# Patient Record
Sex: MALE | Race: WHITE | NOT HISPANIC OR LATINO | Employment: OTHER | URBAN - METROPOLITAN AREA
[De-identification: names, ages, dates, MRNs, and addresses within clinical notes are randomized per-mention and may not be internally consistent; named-entity substitution may affect disease eponyms.]

---

## 2017-01-16 ENCOUNTER — COMMUNICATION - HEALTHEAST (OUTPATIENT)
Dept: FAMILY MEDICINE | Facility: CLINIC | Age: 62
End: 2017-01-16

## 2017-02-23 ENCOUNTER — OFFICE VISIT - HEALTHEAST (OUTPATIENT)
Dept: FAMILY MEDICINE | Facility: CLINIC | Age: 62
End: 2017-02-23

## 2017-02-23 DIAGNOSIS — Z20.828 EXPOSURE TO INFLUENZA: ICD-10-CM

## 2017-02-23 DIAGNOSIS — R05.9 COUGH: ICD-10-CM

## 2017-02-23 ASSESSMENT — MIFFLIN-ST. JEOR: SCORE: 1908.03

## 2017-05-06 ENCOUNTER — COMMUNICATION - HEALTHEAST (OUTPATIENT)
Dept: FAMILY MEDICINE | Facility: CLINIC | Age: 62
End: 2017-05-06

## 2017-05-06 DIAGNOSIS — E78.5 HYPERLIPIDEMIA: ICD-10-CM

## 2017-05-06 DIAGNOSIS — I10 ESSENTIAL HYPERTENSION, BENIGN: ICD-10-CM

## 2017-05-31 ENCOUNTER — OFFICE VISIT - HEALTHEAST (OUTPATIENT)
Dept: FAMILY MEDICINE | Facility: CLINIC | Age: 62
End: 2017-05-31

## 2017-05-31 DIAGNOSIS — I10 HYPERTENSION: ICD-10-CM

## 2017-05-31 DIAGNOSIS — N40.0 BPH (BENIGN PROSTATIC HYPERPLASIA): ICD-10-CM

## 2017-05-31 DIAGNOSIS — E78.5 HYPERLIPIDEMIA: ICD-10-CM

## 2017-05-31 DIAGNOSIS — Z00.00 PHYSICAL EXAM: ICD-10-CM

## 2017-05-31 DIAGNOSIS — R60.0 PEDAL EDEMA: ICD-10-CM

## 2017-05-31 LAB
CHOLEST SERPL-MCNC: 181 MG/DL
FASTING STATUS PATIENT QL REPORTED: YES
HDLC SERPL-MCNC: 57 MG/DL
LDLC SERPL CALC-MCNC: 92 MG/DL
TRIGL SERPL-MCNC: 158 MG/DL

## 2017-05-31 ASSESSMENT — MIFFLIN-ST. JEOR: SCORE: 1837.15

## 2017-06-05 ENCOUNTER — COMMUNICATION - HEALTHEAST (OUTPATIENT)
Dept: FAMILY MEDICINE | Facility: CLINIC | Age: 62
End: 2017-06-05

## 2017-07-25 ENCOUNTER — COMMUNICATION - HEALTHEAST (OUTPATIENT)
Dept: FAMILY MEDICINE | Facility: CLINIC | Age: 62
End: 2017-07-25

## 2017-08-22 ENCOUNTER — COMMUNICATION - HEALTHEAST (OUTPATIENT)
Dept: FAMILY MEDICINE | Facility: CLINIC | Age: 62
End: 2017-08-22

## 2017-08-22 DIAGNOSIS — E78.5 HYPERLIPIDEMIA: ICD-10-CM

## 2017-08-22 DIAGNOSIS — I10 ESSENTIAL HYPERTENSION, BENIGN: ICD-10-CM

## 2018-02-08 ENCOUNTER — OFFICE VISIT - HEALTHEAST (OUTPATIENT)
Dept: FAMILY MEDICINE | Facility: CLINIC | Age: 63
End: 2018-02-08

## 2018-02-08 DIAGNOSIS — K57.92 DIVERTICULITIS: ICD-10-CM

## 2018-02-08 ASSESSMENT — MIFFLIN-ST. JEOR: SCORE: 1913.92

## 2018-02-12 ENCOUNTER — OFFICE VISIT - HEALTHEAST (OUTPATIENT)
Dept: FAMILY MEDICINE | Facility: CLINIC | Age: 63
End: 2018-02-12

## 2018-02-12 DIAGNOSIS — K92.1 BLOODY STOOLS: ICD-10-CM

## 2018-02-12 ASSESSMENT — MIFFLIN-ST. JEOR: SCORE: 1908.03

## 2018-02-13 ENCOUNTER — COMMUNICATION - HEALTHEAST (OUTPATIENT)
Dept: FAMILY MEDICINE | Facility: CLINIC | Age: 63
End: 2018-02-13

## 2018-04-02 ENCOUNTER — OFFICE VISIT - HEALTHEAST (OUTPATIENT)
Dept: FAMILY MEDICINE | Facility: CLINIC | Age: 63
End: 2018-04-02

## 2018-04-02 DIAGNOSIS — J02.9 SORE THROAT: ICD-10-CM

## 2018-04-02 DIAGNOSIS — J01.00 ACUTE NON-RECURRENT MAXILLARY SINUSITIS: ICD-10-CM

## 2018-04-02 LAB — DEPRECATED S PYO AG THROAT QL EIA: NORMAL

## 2018-04-02 ASSESSMENT — MIFFLIN-ST. JEOR: SCORE: 1894.42

## 2018-04-03 LAB — GROUP A STREP BY PCR: NORMAL

## 2018-05-29 ENCOUNTER — RECORDS - HEALTHEAST (OUTPATIENT)
Dept: ADMINISTRATIVE | Facility: OTHER | Age: 63
End: 2018-05-29

## 2018-05-30 ENCOUNTER — RECORDS - HEALTHEAST (OUTPATIENT)
Dept: ADMINISTRATIVE | Facility: OTHER | Age: 63
End: 2018-05-30

## 2018-06-20 ENCOUNTER — COMMUNICATION - HEALTHEAST (OUTPATIENT)
Dept: FAMILY MEDICINE | Facility: CLINIC | Age: 63
End: 2018-06-20

## 2018-08-14 ENCOUNTER — OFFICE VISIT - HEALTHEAST (OUTPATIENT)
Dept: FAMILY MEDICINE | Facility: CLINIC | Age: 63
End: 2018-08-14

## 2018-08-14 DIAGNOSIS — H60.393 INFECTIVE OTITIS EXTERNA, BILATERAL: ICD-10-CM

## 2018-08-14 ASSESSMENT — MIFFLIN-ST. JEOR: SCORE: 1876.27

## 2018-08-23 ENCOUNTER — RECORDS - HEALTHEAST (OUTPATIENT)
Dept: ADMINISTRATIVE | Facility: OTHER | Age: 63
End: 2018-08-23

## 2018-08-23 ENCOUNTER — OFFICE VISIT - HEALTHEAST (OUTPATIENT)
Dept: FAMILY MEDICINE | Facility: CLINIC | Age: 63
End: 2018-08-23

## 2018-08-23 DIAGNOSIS — Z09 FOLLOW UP: ICD-10-CM

## 2018-08-23 DIAGNOSIS — H66.90 OTITIS MEDIA: ICD-10-CM

## 2018-08-23 DIAGNOSIS — H60.90 OTITIS EXTERNA: ICD-10-CM

## 2018-09-04 ENCOUNTER — RECORDS - HEALTHEAST (OUTPATIENT)
Dept: ADMINISTRATIVE | Facility: OTHER | Age: 63
End: 2018-09-04

## 2018-09-07 ENCOUNTER — COMMUNICATION - HEALTHEAST (OUTPATIENT)
Dept: FAMILY MEDICINE | Facility: CLINIC | Age: 63
End: 2018-09-07

## 2018-09-07 DIAGNOSIS — I10 ESSENTIAL HYPERTENSION, BENIGN: ICD-10-CM

## 2018-09-24 ENCOUNTER — OFFICE VISIT - HEALTHEAST (OUTPATIENT)
Dept: FAMILY MEDICINE | Facility: CLINIC | Age: 63
End: 2018-09-24

## 2018-09-24 DIAGNOSIS — I10 BENIGN ESSENTIAL HYPERTENSION: ICD-10-CM

## 2018-09-24 DIAGNOSIS — Z00.00 ROUTINE HISTORY AND PHYSICAL EXAMINATION OF ADULT: ICD-10-CM

## 2018-09-24 DIAGNOSIS — E78.5 HYPERLIPIDEMIA: ICD-10-CM

## 2018-09-24 DIAGNOSIS — E78.5 HYPERLIPIDEMIA LDL GOAL <100: ICD-10-CM

## 2018-09-24 LAB
ALBUMIN SERPL-MCNC: 4.2 G/DL (ref 3.5–5)
ALP SERPL-CCNC: 66 U/L (ref 45–120)
ALT SERPL W P-5'-P-CCNC: 29 U/L (ref 0–45)
ANION GAP SERPL CALCULATED.3IONS-SCNC: 12 MMOL/L (ref 5–18)
AST SERPL W P-5'-P-CCNC: 24 U/L (ref 0–40)
BILIRUB SERPL-MCNC: 1.1 MG/DL (ref 0–1)
BUN SERPL-MCNC: 15 MG/DL (ref 8–22)
CALCIUM SERPL-MCNC: 9.7 MG/DL (ref 8.5–10.5)
CHLORIDE BLD-SCNC: 102 MMOL/L (ref 98–107)
CHOLEST SERPL-MCNC: 206 MG/DL
CO2 SERPL-SCNC: 25 MMOL/L (ref 22–31)
CREAT SERPL-MCNC: 0.84 MG/DL (ref 0.7–1.3)
ERYTHROCYTE [DISTWIDTH] IN BLOOD BY AUTOMATED COUNT: 12.9 % (ref 11–14.5)
FASTING STATUS PATIENT QL REPORTED: YES
GFR SERPL CREATININE-BSD FRML MDRD: >60 ML/MIN/1.73M2
GLUCOSE BLD-MCNC: 98 MG/DL (ref 70–125)
HCT VFR BLD AUTO: 50.3 % (ref 40–54)
HDLC SERPL-MCNC: 59 MG/DL
HGB BLD-MCNC: 17.2 G/DL (ref 14–18)
LDLC SERPL CALC-MCNC: 121 MG/DL
MCH RBC QN AUTO: 30 PG (ref 27–34)
MCHC RBC AUTO-ENTMCNC: 34.2 G/DL (ref 32–36)
MCV RBC AUTO: 88 FL (ref 80–100)
PLATELET # BLD AUTO: 188 THOU/UL (ref 140–440)
PMV BLD AUTO: 7 FL (ref 7–10)
POTASSIUM BLD-SCNC: 4.8 MMOL/L (ref 3.5–5)
PROT SERPL-MCNC: 7.3 G/DL (ref 6–8)
RBC # BLD AUTO: 5.75 MILL/UL (ref 4.4–6.2)
SODIUM SERPL-SCNC: 139 MMOL/L (ref 136–145)
TRIGL SERPL-MCNC: 128 MG/DL
WBC: 9.2 THOU/UL (ref 4–11)

## 2018-09-24 ASSESSMENT — MIFFLIN-ST. JEOR: SCORE: 1898.95

## 2018-09-25 LAB — 25(OH)D3 SERPL-MCNC: 36 NG/ML (ref 30–80)

## 2018-09-26 ENCOUNTER — COMMUNICATION - HEALTHEAST (OUTPATIENT)
Dept: FAMILY MEDICINE | Facility: CLINIC | Age: 63
End: 2018-09-26

## 2018-11-02 ENCOUNTER — RECORDS - HEALTHEAST (OUTPATIENT)
Dept: ADMINISTRATIVE | Facility: OTHER | Age: 63
End: 2018-11-02

## 2018-11-09 ENCOUNTER — COMMUNICATION - HEALTHEAST (OUTPATIENT)
Dept: FAMILY MEDICINE | Facility: CLINIC | Age: 63
End: 2018-11-09

## 2018-11-27 ENCOUNTER — AMBULATORY - HEALTHEAST (OUTPATIENT)
Dept: NURSING | Facility: CLINIC | Age: 63
End: 2018-11-27

## 2018-11-27 DIAGNOSIS — Z23 NEED FOR VACCINATION: ICD-10-CM

## 2019-02-18 ENCOUNTER — COMMUNICATION - HEALTHEAST (OUTPATIENT)
Dept: FAMILY MEDICINE | Facility: CLINIC | Age: 64
End: 2019-02-18

## 2019-04-04 ENCOUNTER — RECORDS - HEALTHEAST (OUTPATIENT)
Dept: ADMINISTRATIVE | Facility: OTHER | Age: 64
End: 2019-04-04

## 2019-04-08 ENCOUNTER — HOSPITAL ENCOUNTER (OUTPATIENT)
Dept: NEUROLOGY | Facility: HOSPITAL | Age: 64
Discharge: HOME | End: 2019-04-08
Attending: PSYCHIATRY & NEUROLOGY

## 2019-04-08 DIAGNOSIS — R41.840 IMPAIRED ATTENTION: ICD-10-CM

## 2019-09-04 ENCOUNTER — OFFICE VISIT - HEALTHEAST (OUTPATIENT)
Dept: FAMILY MEDICINE | Facility: CLINIC | Age: 64
End: 2019-09-04

## 2019-09-04 DIAGNOSIS — R10.31 ABDOMINAL PAIN, RIGHT LOWER QUADRANT: ICD-10-CM

## 2019-09-04 DIAGNOSIS — K57.32 DIVERTICULITIS OF COLON: ICD-10-CM

## 2019-09-04 LAB
ALBUMIN SERPL-MCNC: 3.9 G/DL (ref 3.5–5)
ALP SERPL-CCNC: 76 U/L (ref 45–120)
ALT SERPL W P-5'-P-CCNC: 22 U/L (ref 0–45)
ANION GAP SERPL CALCULATED.3IONS-SCNC: 12 MMOL/L (ref 5–18)
AST SERPL W P-5'-P-CCNC: 18 U/L (ref 0–40)
BACTERIA #/AREA URNS HPF: ABNORMAL HPF
BASOPHILS # BLD AUTO: 0.1 THOU/UL (ref 0–0.2)
BASOPHILS NFR BLD AUTO: 1 % (ref 0–2)
BILIRUB SERPL-MCNC: 1.6 MG/DL (ref 0–1)
BUN SERPL-MCNC: 12 MG/DL (ref 8–22)
CALCIUM SERPL-MCNC: 9.6 MG/DL (ref 8.5–10.5)
CHLORIDE BLD-SCNC: 98 MMOL/L (ref 98–107)
CO2 SERPL-SCNC: 27 MMOL/L (ref 22–31)
CREAT SERPL-MCNC: 1.05 MG/DL (ref 0.7–1.3)
EOSINOPHIL # BLD AUTO: 0.2 THOU/UL (ref 0–0.4)
EOSINOPHIL NFR BLD AUTO: 1 % (ref 0–6)
ERYTHROCYTE [DISTWIDTH] IN BLOOD BY AUTOMATED COUNT: 11.6 % (ref 11–14.5)
ERYTHROCYTE [SEDIMENTATION RATE] IN BLOOD BY WESTERGREN METHOD: 34 MM/HR (ref 0–15)
GFR SERPL CREATININE-BSD FRML MDRD: >60 ML/MIN/1.73M2
GLUCOSE BLD-MCNC: 107 MG/DL (ref 70–125)
HCT VFR BLD AUTO: 50.7 % (ref 40–54)
HGB BLD-MCNC: 17.3 G/DL (ref 14–18)
LYMPHOCYTES # BLD AUTO: 1.4 THOU/UL (ref 0.8–4.4)
LYMPHOCYTES NFR BLD AUTO: 11 % (ref 20–40)
MCH RBC QN AUTO: 30.3 PG (ref 27–34)
MCHC RBC AUTO-ENTMCNC: 34.2 G/DL (ref 32–36)
MCV RBC AUTO: 89 FL (ref 80–100)
MONOCYTES # BLD AUTO: 0.8 THOU/UL (ref 0–0.9)
MONOCYTES NFR BLD AUTO: 6 % (ref 2–10)
MUCOUS THREADS #/AREA URNS LPF: ABNORMAL LPF
NEUTROPHILS # BLD AUTO: 10.4 THOU/UL (ref 2–7.7)
NEUTROPHILS NFR BLD AUTO: 81 % (ref 50–70)
PLATELET # BLD AUTO: 203 THOU/UL (ref 140–440)
PMV BLD AUTO: 7.3 FL (ref 7–10)
POTASSIUM BLD-SCNC: 4.4 MMOL/L (ref 3.5–5)
PROT SERPL-MCNC: 7.2 G/DL (ref 6–8)
RBC # BLD AUTO: 5.71 MILL/UL (ref 4.4–6.2)
RBC #/AREA URNS AUTO: ABNORMAL HPF
SODIUM SERPL-SCNC: 137 MMOL/L (ref 136–145)
SQUAMOUS #/AREA URNS AUTO: ABNORMAL LPF
WBC #/AREA URNS AUTO: ABNORMAL HPF
WBC: 12.8 THOU/UL (ref 4–11)

## 2019-09-04 ASSESSMENT — MIFFLIN-ST. JEOR: SCORE: 1872.87

## 2019-09-05 LAB — BACTERIA SPEC CULT: NO GROWTH

## 2019-09-26 ENCOUNTER — HOSPITAL ENCOUNTER (OUTPATIENT)
Dept: CT IMAGING | Facility: CLINIC | Age: 64
Discharge: HOME | End: 2019-09-26
Attending: FAMILY MEDICINE

## 2019-09-26 ENCOUNTER — OFFICE VISIT - HEALTHEAST (OUTPATIENT)
Dept: FAMILY MEDICINE | Facility: CLINIC | Age: 64
End: 2019-09-26

## 2019-09-26 DIAGNOSIS — K57.92 ACUTE DIVERTICULITIS: ICD-10-CM

## 2019-09-26 DIAGNOSIS — E66.09 CLASS 1 OBESITY DUE TO EXCESS CALORIES WITHOUT SERIOUS COMORBIDITY WITH BODY MASS INDEX (BMI) OF 34.0 TO 34.9 IN ADULT: ICD-10-CM

## 2019-09-26 DIAGNOSIS — Z00.00 ROUTINE GENERAL MEDICAL EXAMINATION AT A HEALTH CARE FACILITY: ICD-10-CM

## 2019-09-26 DIAGNOSIS — M54.50 CHRONIC MIDLINE LOW BACK PAIN WITHOUT SCIATICA: ICD-10-CM

## 2019-09-26 DIAGNOSIS — Z72.0 SMOKELESS TOBACCO USE: ICD-10-CM

## 2019-09-26 DIAGNOSIS — G89.29 CHRONIC MIDLINE LOW BACK PAIN WITHOUT SCIATICA: ICD-10-CM

## 2019-09-26 DIAGNOSIS — I10 ESSENTIAL HYPERTENSION, BENIGN: ICD-10-CM

## 2019-09-26 DIAGNOSIS — E80.6 HYPERBILIRUBINEMIA: ICD-10-CM

## 2019-09-26 DIAGNOSIS — Z86.69 HISTORY OF BELL'S PALSY: ICD-10-CM

## 2019-09-26 DIAGNOSIS — R76.8 HEPATITIS C ANTIBODY TEST POSITIVE: ICD-10-CM

## 2019-09-26 DIAGNOSIS — E66.811 CLASS 1 OBESITY DUE TO EXCESS CALORIES WITHOUT SERIOUS COMORBIDITY WITH BODY MASS INDEX (BMI) OF 34.0 TO 34.9 IN ADULT: ICD-10-CM

## 2019-09-26 DIAGNOSIS — E78.5 HYPERLIPIDEMIA LDL GOAL <100: ICD-10-CM

## 2019-09-26 LAB
ALBUMIN SERPL-MCNC: 3.8 G/DL (ref 3.5–5)
ALP SERPL-CCNC: 68 U/L (ref 45–120)
ALT SERPL W P-5'-P-CCNC: 32 U/L (ref 0–45)
ANION GAP SERPL CALCULATED.3IONS-SCNC: 11 MMOL/L (ref 5–18)
AST SERPL W P-5'-P-CCNC: 31 U/L (ref 0–40)
BILIRUB SERPL-MCNC: 0.9 MG/DL (ref 0–1)
BUN SERPL-MCNC: 11 MG/DL (ref 8–22)
C REACTIVE PROTEIN LHE: 0.5 MG/DL (ref 0–0.8)
CALCIUM SERPL-MCNC: 9.7 MG/DL (ref 8.5–10.5)
CHLORIDE BLD-SCNC: 102 MMOL/L (ref 98–107)
CHOLEST SERPL-MCNC: 125 MG/DL
CO2 SERPL-SCNC: 26 MMOL/L (ref 22–31)
CREAT SERPL-MCNC: 0.88 MG/DL (ref 0.7–1.3)
ERYTHROCYTE [DISTWIDTH] IN BLOOD BY AUTOMATED COUNT: 12.4 % (ref 11–14.5)
FASTING STATUS PATIENT QL REPORTED: YES
GFR SERPL CREATININE-BSD FRML MDRD: >60 ML/MIN/1.73M2
GLUCOSE BLD-MCNC: 89 MG/DL (ref 70–125)
HCT VFR BLD AUTO: 49.5 % (ref 40–54)
HDLC SERPL-MCNC: 50 MG/DL
HGB BLD-MCNC: 17 G/DL (ref 14–18)
LDLC SERPL CALC-MCNC: 53 MG/DL
MCH RBC QN AUTO: 29.8 PG (ref 27–34)
MCHC RBC AUTO-ENTMCNC: 34.3 G/DL (ref 32–36)
MCV RBC AUTO: 87 FL (ref 80–100)
PLATELET # BLD AUTO: 225 THOU/UL (ref 140–440)
PMV BLD AUTO: 6.8 FL (ref 7–10)
POTASSIUM BLD-SCNC: 4.6 MMOL/L (ref 3.5–5)
PROT SERPL-MCNC: 6.9 G/DL (ref 6–8)
PSA SERPL-MCNC: 0.6 NG/ML (ref 0–4.5)
RBC # BLD AUTO: 5.69 MILL/UL (ref 4.4–6.2)
SODIUM SERPL-SCNC: 139 MMOL/L (ref 136–145)
TRIGL SERPL-MCNC: 110 MG/DL
WBC: 9 THOU/UL (ref 4–11)

## 2019-09-26 ASSESSMENT — MIFFLIN-ST. JEOR: SCORE: 1850.76

## 2019-09-27 LAB — HCV AB SERPL QL IA: POSITIVE

## 2019-10-01 ENCOUNTER — COMMUNICATION - HEALTHEAST (OUTPATIENT)
Dept: SCHEDULING | Facility: CLINIC | Age: 64
End: 2019-10-01

## 2019-10-03 LAB
HCV RNA SERPL NAA+PROBE-ACNC: NORMAL [IU]/ML
HCV RNA SERPL NAA+PROBE-LOG IU: NORMAL LOG IU/ML

## 2019-10-09 ENCOUNTER — OFFICE VISIT - HEALTHEAST (OUTPATIENT)
Dept: FAMILY MEDICINE | Facility: CLINIC | Age: 64
End: 2019-10-09

## 2019-10-09 ENCOUNTER — COMMUNICATION - HEALTHEAST (OUTPATIENT)
Dept: SCHEDULING | Facility: CLINIC | Age: 64
End: 2019-10-09

## 2019-10-09 DIAGNOSIS — D69.2 PURPURA (H): ICD-10-CM

## 2019-10-09 DIAGNOSIS — R19.7 DIARRHEA, UNSPECIFIED TYPE: ICD-10-CM

## 2019-10-09 DIAGNOSIS — A04.72 C. DIFFICILE COLITIS: ICD-10-CM

## 2019-10-09 LAB
ANION GAP SERPL CALCULATED.3IONS-SCNC: 9 MMOL/L (ref 5–18)
BASOPHILS # BLD AUTO: 0 THOU/UL (ref 0–0.2)
BASOPHILS NFR BLD AUTO: 1 % (ref 0–2)
BUN SERPL-MCNC: 9 MG/DL (ref 8–22)
C DIFF TOX B STL QL: POSITIVE
CALCIUM SERPL-MCNC: 9.5 MG/DL (ref 8.5–10.5)
CHLORIDE BLD-SCNC: 102 MMOL/L (ref 98–107)
CO2 SERPL-SCNC: 27 MMOL/L (ref 22–31)
CREAT SERPL-MCNC: 0.81 MG/DL (ref 0.7–1.3)
EOSINOPHIL # BLD AUTO: 0.2 THOU/UL (ref 0–0.4)
EOSINOPHIL NFR BLD AUTO: 2 % (ref 0–6)
ERYTHROCYTE [DISTWIDTH] IN BLOOD BY AUTOMATED COUNT: 12.5 % (ref 11–14.5)
GFR SERPL CREATININE-BSD FRML MDRD: >60 ML/MIN/1.73M2
GLUCOSE BLD-MCNC: 82 MG/DL (ref 70–125)
HCT VFR BLD AUTO: 46.7 % (ref 40–54)
HGB BLD-MCNC: 16.1 G/DL (ref 14–18)
LYMPHOCYTES # BLD AUTO: 1.4 THOU/UL (ref 0.8–4.4)
LYMPHOCYTES NFR BLD AUTO: 16 % (ref 20–40)
MCH RBC QN AUTO: 30.2 PG (ref 27–34)
MCHC RBC AUTO-ENTMCNC: 34.4 G/DL (ref 32–36)
MCV RBC AUTO: 88 FL (ref 80–100)
MONOCYTES # BLD AUTO: 0.6 THOU/UL (ref 0–0.9)
MONOCYTES NFR BLD AUTO: 7 % (ref 2–10)
NEUTROPHILS # BLD AUTO: 6.4 THOU/UL (ref 2–7.7)
NEUTROPHILS NFR BLD AUTO: 75 % (ref 50–70)
PLATELET # BLD AUTO: 240 THOU/UL (ref 140–440)
PMV BLD AUTO: 6.8 FL (ref 7–10)
POTASSIUM BLD-SCNC: 4.2 MMOL/L (ref 3.5–5)
RBC # BLD AUTO: 5.32 MILL/UL (ref 4.4–6.2)
RIBOTYPE 027/NAP1/BI: ABNORMAL
SODIUM SERPL-SCNC: 138 MMOL/L (ref 136–145)
WBC: 8.5 THOU/UL (ref 4–11)

## 2019-10-09 ASSESSMENT — MIFFLIN-ST. JEOR: SCORE: 1859.95

## 2019-10-10 ENCOUNTER — COMMUNICATION - HEALTHEAST (OUTPATIENT)
Dept: FAMILY MEDICINE | Facility: CLINIC | Age: 64
End: 2019-10-10

## 2019-10-10 LAB
SHIGA TOXIN 1: NEGATIVE
SHIGA TOXIN 2: NEGATIVE

## 2019-10-13 LAB — BACTERIA SPEC CULT: NORMAL

## 2019-10-16 ENCOUNTER — COMMUNICATION - HEALTHEAST (OUTPATIENT)
Dept: FAMILY MEDICINE | Facility: CLINIC | Age: 64
End: 2019-10-16

## 2019-10-16 ENCOUNTER — RECORDS - HEALTHEAST (OUTPATIENT)
Dept: ADMINISTRATIVE | Facility: OTHER | Age: 64
End: 2019-10-16

## 2019-10-16 DIAGNOSIS — E78.5 HYPERLIPIDEMIA: ICD-10-CM

## 2019-10-16 DIAGNOSIS — I10 ESSENTIAL HYPERTENSION, BENIGN: ICD-10-CM

## 2019-10-25 ENCOUNTER — RECORDS - HEALTHEAST (OUTPATIENT)
Dept: ADMINISTRATIVE | Facility: OTHER | Age: 64
End: 2019-10-25

## 2019-10-25 ENCOUNTER — COMMUNICATION - HEALTHEAST (OUTPATIENT)
Dept: FAMILY MEDICINE | Facility: CLINIC | Age: 64
End: 2019-10-25

## 2019-10-25 DIAGNOSIS — A04.72 C. DIFFICILE COLITIS: ICD-10-CM

## 2019-11-04 ENCOUNTER — AMBULATORY - HEALTHEAST (OUTPATIENT)
Dept: LAB | Facility: CLINIC | Age: 64
End: 2019-11-04

## 2019-11-04 DIAGNOSIS — A04.72 C. DIFFICILE COLITIS: ICD-10-CM

## 2019-11-04 LAB
C DIFF TOX B STL QL: NEGATIVE
RIBOTYPE 027/NAP1/BI: NORMAL

## 2019-11-21 ENCOUNTER — OFFICE VISIT - HEALTHEAST (OUTPATIENT)
Dept: FAMILY MEDICINE | Facility: CLINIC | Age: 64
End: 2019-11-21

## 2019-11-21 DIAGNOSIS — G47.33 OBSTRUCTIVE SLEEP APNEA (ADULT) (PEDIATRIC): ICD-10-CM

## 2019-11-21 DIAGNOSIS — K40.90 RIGHT INGUINAL HERNIA: ICD-10-CM

## 2019-11-21 DIAGNOSIS — K57.32 SIGMOID DIVERTICULITIS: ICD-10-CM

## 2019-11-21 DIAGNOSIS — Z01.810 PRE-OPERATIVE CARDIOVASCULAR EXAMINATION: ICD-10-CM

## 2019-11-21 DIAGNOSIS — A04.72 C. DIFFICILE COLITIS: ICD-10-CM

## 2019-11-21 DIAGNOSIS — I10 ESSENTIAL HYPERTENSION, BENIGN: ICD-10-CM

## 2019-11-21 DIAGNOSIS — E78.5 HYPERLIPIDEMIA LDL GOAL <100: ICD-10-CM

## 2019-11-21 LAB
ANION GAP SERPL CALCULATED.3IONS-SCNC: 9 MMOL/L (ref 5–18)
ATRIAL RATE - MUSE: 70 BPM
BUN SERPL-MCNC: 12 MG/DL (ref 8–22)
CALCIUM SERPL-MCNC: 9.4 MG/DL (ref 8.5–10.5)
CHLORIDE BLD-SCNC: 101 MMOL/L (ref 98–107)
CO2 SERPL-SCNC: 31 MMOL/L (ref 22–31)
CREAT SERPL-MCNC: 0.98 MG/DL (ref 0.7–1.3)
DIASTOLIC BLOOD PRESSURE - MUSE: NORMAL
ERYTHROCYTE [DISTWIDTH] IN BLOOD BY AUTOMATED COUNT: 12.1 % (ref 11–14.5)
GFR SERPL CREATININE-BSD FRML MDRD: >60 ML/MIN/1.73M2
GLUCOSE BLD-MCNC: 77 MG/DL (ref 70–125)
HCT VFR BLD AUTO: 47.9 % (ref 40–54)
HGB BLD-MCNC: 16.8 G/DL (ref 14–18)
INTERPRETATION ECG - MUSE: NORMAL
MCH RBC QN AUTO: 30.5 PG (ref 27–34)
MCHC RBC AUTO-ENTMCNC: 35.1 G/DL (ref 32–36)
MCV RBC AUTO: 87 FL (ref 80–100)
P AXIS - MUSE: 48 DEGREES
PLATELET # BLD AUTO: 199 THOU/UL (ref 140–440)
PMV BLD AUTO: 6.9 FL (ref 7–10)
POTASSIUM BLD-SCNC: 4.9 MMOL/L (ref 3.5–5)
PR INTERVAL - MUSE: 174 MS
QRS DURATION - MUSE: 104 MS
QT - MUSE: 390 MS
QTC - MUSE: 421 MS
R AXIS - MUSE: -4 DEGREES
RBC # BLD AUTO: 5.52 MILL/UL (ref 4.4–6.2)
SODIUM SERPL-SCNC: 141 MMOL/L (ref 136–145)
SYSTOLIC BLOOD PRESSURE - MUSE: NORMAL
T AXIS - MUSE: 25 DEGREES
VENTRICULAR RATE- MUSE: 70 BPM
WBC: 6.7 THOU/UL (ref 4–11)

## 2019-11-21 ASSESSMENT — MIFFLIN-ST. JEOR: SCORE: 1845.09

## 2019-11-22 ENCOUNTER — RECORDS - HEALTHEAST (OUTPATIENT)
Dept: ADMINISTRATIVE | Facility: OTHER | Age: 64
End: 2019-11-22

## 2019-11-22 ENCOUNTER — COMMUNICATION - HEALTHEAST (OUTPATIENT)
Dept: FAMILY MEDICINE | Facility: CLINIC | Age: 64
End: 2019-11-22

## 2019-11-25 ENCOUNTER — AMBULATORY - HEALTHEAST (OUTPATIENT)
Dept: FAMILY MEDICINE | Facility: CLINIC | Age: 64
End: 2019-11-25

## 2019-11-25 DIAGNOSIS — K40.90 RIGHT INGUINAL HERNIA: ICD-10-CM

## 2019-11-26 ENCOUNTER — OFFICE VISIT - HEALTHEAST (OUTPATIENT)
Dept: SURGERY | Facility: CLINIC | Age: 64
End: 2019-11-26

## 2019-11-26 DIAGNOSIS — K40.90 NON-RECURRENT UNILATERAL INGUINAL HERNIA WITHOUT OBSTRUCTION OR GANGRENE: ICD-10-CM

## 2019-11-26 ASSESSMENT — MIFFLIN-ST. JEOR: SCORE: 1854.16

## 2019-11-27 ENCOUNTER — RECORDS - HEALTHEAST (OUTPATIENT)
Dept: ADMINISTRATIVE | Facility: OTHER | Age: 64
End: 2019-11-27

## 2019-12-04 ENCOUNTER — COMMUNICATION - HEALTHEAST (OUTPATIENT)
Dept: FAMILY MEDICINE | Facility: CLINIC | Age: 64
End: 2019-12-04

## 2019-12-05 ENCOUNTER — ANESTHESIA - HEALTHEAST (OUTPATIENT)
Dept: SURGERY | Facility: HOSPITAL | Age: 64
End: 2019-12-05

## 2019-12-05 ENCOUNTER — SURGERY - HEALTHEAST (OUTPATIENT)
Dept: SURGERY | Facility: HOSPITAL | Age: 64
End: 2019-12-05

## 2019-12-05 ASSESSMENT — MIFFLIN-ST. JEOR
SCORE: 1830.46
SCORE: 1830.46
SCORE: 1815.61

## 2019-12-06 ASSESSMENT — MIFFLIN-ST. JEOR: SCORE: 1839.99

## 2019-12-07 ASSESSMENT — MIFFLIN-ST. JEOR: SCORE: 1813.23

## 2019-12-08 ASSESSMENT — MIFFLIN-ST. JEOR: SCORE: 1815.04

## 2019-12-09 ASSESSMENT — MIFFLIN-ST. JEOR: SCORE: 1813.68

## 2019-12-10 ASSESSMENT — MIFFLIN-ST. JEOR
SCORE: 1840.89
SCORE: 1820.94

## 2019-12-11 ASSESSMENT — MIFFLIN-ST. JEOR
SCORE: 1876.28
SCORE: 1872.19

## 2019-12-12 ASSESSMENT — MIFFLIN-ST. JEOR: SCORE: 1869.47

## 2019-12-13 ASSESSMENT — MIFFLIN-ST. JEOR
SCORE: 1846.34
SCORE: 1858.13

## 2019-12-14 ASSESSMENT — MIFFLIN-ST. JEOR
SCORE: 1827.74
SCORE: 1839.53

## 2019-12-15 ASSESSMENT — MIFFLIN-ST. JEOR
SCORE: 1814.13
SCORE: 1815.49

## 2019-12-21 ASSESSMENT — MIFFLIN-ST. JEOR
SCORE: 1776.48

## 2019-12-22 ASSESSMENT — MIFFLIN-ST. JEOR
SCORE: 1762.88

## 2019-12-24 ENCOUNTER — SURGERY - HEALTHEAST (OUTPATIENT)
Dept: GASTROENTEROLOGY | Facility: HOSPITAL | Age: 64
End: 2019-12-24

## 2019-12-25 ASSESSMENT — MIFFLIN-ST. JEOR
SCORE: 1785.56
SCORE: 1790.09
SCORE: 1785.56
SCORE: 1785.56

## 2019-12-26 ENCOUNTER — SURGERY - HEALTHEAST (OUTPATIENT)
Dept: GASTROENTEROLOGY | Facility: HOSPITAL | Age: 64
End: 2019-12-26

## 2019-12-26 ASSESSMENT — MIFFLIN-ST. JEOR
SCORE: 1794.63

## 2019-12-27 ENCOUNTER — SURGERY - HEALTHEAST (OUTPATIENT)
Dept: GASTROENTEROLOGY | Facility: HOSPITAL | Age: 64
End: 2019-12-27

## 2019-12-30 ASSESSMENT — MIFFLIN-ST. JEOR
SCORE: 1784.65

## 2019-12-31 ENCOUNTER — HOME CARE/HOSPICE - HEALTHEAST (OUTPATIENT)
Dept: HOME HEALTH SERVICES | Facility: HOME HEALTH | Age: 64
End: 2019-12-31

## 2019-12-31 ASSESSMENT — MIFFLIN-ST. JEOR
SCORE: 1770.59

## 2020-01-03 ASSESSMENT — MIFFLIN-ST. JEOR
SCORE: 1771.04

## 2020-01-04 ASSESSMENT — MIFFLIN-ST. JEOR
SCORE: 1765.14

## 2020-01-05 ASSESSMENT — MIFFLIN-ST. JEOR
SCORE: 1776.48

## 2020-01-06 ASSESSMENT — MIFFLIN-ST. JEOR
SCORE: 1776.48

## 2020-01-07 ASSESSMENT — MIFFLIN-ST. JEOR
SCORE: 1749.72

## 2020-01-09 ASSESSMENT — MIFFLIN-ST. JEOR
SCORE: 1732.94

## 2020-01-13 ENCOUNTER — RECORDS - HEALTHEAST (OUTPATIENT)
Dept: LAB | Facility: HOSPITAL | Age: 65
End: 2020-01-13

## 2020-01-13 LAB
ALBUMIN SERPL-MCNC: 3.3 G/DL (ref 3.5–5)
ALP SERPL-CCNC: 71 U/L (ref 45–120)
ALT SERPL W P-5'-P-CCNC: 16 U/L (ref 0–45)
ANION GAP SERPL CALCULATED.3IONS-SCNC: 8 MMOL/L (ref 5–18)
AST SERPL W P-5'-P-CCNC: 18 U/L (ref 0–40)
BASOPHILS # BLD AUTO: 0.1 THOU/UL (ref 0–0.2)
BASOPHILS NFR BLD AUTO: 1 % (ref 0–2)
BILIRUB SERPL-MCNC: 0.5 MG/DL (ref 0–1)
BUN SERPL-MCNC: 30 MG/DL (ref 8–22)
CALCIUM SERPL-MCNC: 9.4 MG/DL (ref 8.5–10.5)
CHLORIDE BLD-SCNC: 105 MMOL/L (ref 98–107)
CO2 SERPL-SCNC: 22 MMOL/L (ref 22–31)
CREAT SERPL-MCNC: 1.62 MG/DL (ref 0.7–1.3)
EOSINOPHIL # BLD AUTO: 0.3 THOU/UL (ref 0–0.4)
EOSINOPHIL NFR BLD AUTO: 4 % (ref 0–6)
ERYTHROCYTE [DISTWIDTH] IN BLOOD BY AUTOMATED COUNT: 13.2 % (ref 11–14.5)
FASTING STATUS PATIENT QL REPORTED: YES
GFR SERPL CREATININE-BSD FRML MDRD: 43 ML/MIN/1.73M2
GLUCOSE BLD-MCNC: 106 MG/DL (ref 70–125)
HCT VFR BLD AUTO: 36.2 % (ref 40–54)
HGB BLD-MCNC: 12.2 G/DL (ref 14–18)
LYMPHOCYTES # BLD AUTO: 1.3 THOU/UL (ref 0.8–4.4)
LYMPHOCYTES NFR BLD AUTO: 20 % (ref 20–40)
MAGNESIUM SERPL-MCNC: 1.9 MG/DL (ref 1.8–2.6)
MCH RBC QN AUTO: 27.9 PG (ref 27–34)
MCHC RBC AUTO-ENTMCNC: 33.7 G/DL (ref 32–36)
MCV RBC AUTO: 83 FL (ref 80–100)
MONOCYTES # BLD AUTO: 0.5 THOU/UL (ref 0–0.9)
MONOCYTES NFR BLD AUTO: 9 % (ref 2–10)
NEUTROPHILS # BLD AUTO: 4 THOU/UL (ref 2–7.7)
NEUTROPHILS NFR BLD AUTO: 64 % (ref 50–70)
PHOSPHATE SERPL-MCNC: 3.7 MG/DL (ref 2.5–4.5)
PLATELET # BLD AUTO: 291 THOU/UL (ref 140–440)
PMV BLD AUTO: 9.5 FL (ref 8.5–12.5)
POTASSIUM BLD-SCNC: 4.7 MMOL/L (ref 3.5–5)
PREALB SERPL-MCNC: 30.4 MG/DL (ref 19–38)
PROT SERPL-MCNC: 7.5 G/DL (ref 6–8)
RBC # BLD AUTO: 4.38 MILL/UL (ref 4.4–6.2)
SODIUM SERPL-SCNC: 135 MMOL/L (ref 136–145)
TRIGL SERPL-MCNC: 141 MG/DL
WBC: 6.2 THOU/UL (ref 4–11)

## 2020-01-14 ENCOUNTER — COMMUNICATION - HEALTHEAST (OUTPATIENT)
Dept: NURSING | Facility: CLINIC | Age: 65
End: 2020-01-14

## 2020-01-17 ENCOUNTER — RECORDS - HEALTHEAST (OUTPATIENT)
Dept: ADMINISTRATIVE | Facility: OTHER | Age: 65
End: 2020-01-17

## 2020-01-20 ENCOUNTER — RECORDS - HEALTHEAST (OUTPATIENT)
Dept: LAB | Facility: HOSPITAL | Age: 65
End: 2020-01-20

## 2020-01-20 LAB
ALBUMIN SERPL-MCNC: 3.2 G/DL (ref 3.5–5)
ALP SERPL-CCNC: 80 U/L (ref 45–120)
ALT SERPL W P-5'-P-CCNC: 17 U/L (ref 0–45)
ANION GAP SERPL CALCULATED.3IONS-SCNC: 10 MMOL/L (ref 5–18)
AST SERPL W P-5'-P-CCNC: 18 U/L (ref 0–40)
BILIRUB SERPL-MCNC: 0.3 MG/DL (ref 0–1)
BUN SERPL-MCNC: 31 MG/DL (ref 8–22)
CALCIUM SERPL-MCNC: 9.5 MG/DL (ref 8.5–10.5)
CHLORIDE BLD-SCNC: 108 MMOL/L (ref 98–107)
CO2 SERPL-SCNC: 19 MMOL/L (ref 22–31)
CREAT SERPL-MCNC: 1.53 MG/DL (ref 0.7–1.3)
ERYTHROCYTE [DISTWIDTH] IN BLOOD BY AUTOMATED COUNT: 13.8 % (ref 11–14.5)
FASTING STATUS PATIENT QL REPORTED: YES
GFR SERPL CREATININE-BSD FRML MDRD: 46 ML/MIN/1.73M2
GLUCOSE BLD-MCNC: 85 MG/DL (ref 70–125)
HCT VFR BLD AUTO: 37.9 % (ref 40–54)
HGB BLD-MCNC: 12.5 G/DL (ref 14–18)
MAGNESIUM SERPL-MCNC: 1.8 MG/DL (ref 1.8–2.6)
MCH RBC QN AUTO: 27.5 PG (ref 27–34)
MCHC RBC AUTO-ENTMCNC: 33 G/DL (ref 32–36)
MCV RBC AUTO: 84 FL (ref 80–100)
PHOSPHATE SERPL-MCNC: 4.1 MG/DL (ref 2.5–4.5)
PLATELET # BLD AUTO: 304 THOU/UL (ref 140–440)
PMV BLD AUTO: 10 FL (ref 8.5–12.5)
POTASSIUM BLD-SCNC: 5 MMOL/L (ref 3.5–5)
PREALB SERPL-MCNC: <3 MG/DL (ref 19–38)
PROT SERPL-MCNC: 7.3 G/DL (ref 6–8)
RBC # BLD AUTO: 4.54 MILL/UL (ref 4.4–6.2)
SODIUM SERPL-SCNC: 137 MMOL/L (ref 136–145)
TRIGL SERPL-MCNC: 117 MG/DL
WBC: 7.4 THOU/UL (ref 4–11)

## 2020-01-21 ENCOUNTER — RECORDS - HEALTHEAST (OUTPATIENT)
Dept: ADMINISTRATIVE | Facility: OTHER | Age: 65
End: 2020-01-21

## 2020-02-03 ENCOUNTER — RECORDS - HEALTHEAST (OUTPATIENT)
Dept: LAB | Facility: HOSPITAL | Age: 65
End: 2020-02-03

## 2020-02-03 LAB
ALBUMIN SERPL-MCNC: 3 G/DL (ref 3.5–5)
ALP SERPL-CCNC: 76 U/L (ref 45–120)
ALT SERPL W P-5'-P-CCNC: 36 U/L (ref 0–45)
ANION GAP SERPL CALCULATED.3IONS-SCNC: 9 MMOL/L (ref 5–18)
AST SERPL W P-5'-P-CCNC: 22 U/L (ref 0–40)
BASOPHILS # BLD AUTO: 0.1 THOU/UL (ref 0–0.2)
BASOPHILS NFR BLD AUTO: 1 % (ref 0–2)
BILIRUB SERPL-MCNC: 0.4 MG/DL (ref 0–1)
BUN SERPL-MCNC: 26 MG/DL (ref 8–22)
CALCIUM SERPL-MCNC: 8.1 MG/DL (ref 8.5–10.5)
CHLORIDE BLD-SCNC: 101 MMOL/L (ref 98–107)
CO2 SERPL-SCNC: 22 MMOL/L (ref 22–31)
CREAT SERPL-MCNC: 1.1 MG/DL (ref 0.7–1.3)
EOSINOPHIL # BLD AUTO: 0.1 THOU/UL (ref 0–0.4)
EOSINOPHIL NFR BLD AUTO: 0 % (ref 0–6)
ERYTHROCYTE [DISTWIDTH] IN BLOOD BY AUTOMATED COUNT: 13.8 % (ref 11–14.5)
FASTING STATUS PATIENT QL REPORTED: NORMAL
GFR SERPL CREATININE-BSD FRML MDRD: >60 ML/MIN/1.73M2
GLUCOSE BLD-MCNC: 87 MG/DL (ref 70–125)
HCT VFR BLD AUTO: 36.4 % (ref 40–54)
HGB BLD-MCNC: 12.1 G/DL (ref 14–18)
LYMPHOCYTES # BLD AUTO: 1.3 THOU/UL (ref 0.8–4.4)
LYMPHOCYTES NFR BLD AUTO: 9 % (ref 20–40)
MAGNESIUM SERPL-MCNC: 1.6 MG/DL (ref 1.8–2.6)
MCH RBC QN AUTO: 27.6 PG (ref 27–34)
MCHC RBC AUTO-ENTMCNC: 33.2 G/DL (ref 32–36)
MCV RBC AUTO: 83 FL (ref 80–100)
MONOCYTES # BLD AUTO: 0.7 THOU/UL (ref 0–0.9)
MONOCYTES NFR BLD AUTO: 5 % (ref 2–10)
NEUTROPHILS # BLD AUTO: 10.8 THOU/UL (ref 2–7.7)
NEUTROPHILS NFR BLD AUTO: 80 % (ref 50–70)
PHOSPHATE SERPL-MCNC: 2.9 MG/DL (ref 2.5–4.5)
PLATELET # BLD AUTO: 279 THOU/UL (ref 140–440)
PMV BLD AUTO: 9.7 FL (ref 8.5–12.5)
POTASSIUM BLD-SCNC: 4.1 MMOL/L (ref 3.5–5)
PREALB SERPL-MCNC: 35 MG/DL (ref 19–38)
PROT SERPL-MCNC: 6.2 G/DL (ref 6–8)
RBC # BLD AUTO: 4.39 MILL/UL (ref 4.4–6.2)
SODIUM SERPL-SCNC: 132 MMOL/L (ref 136–145)
TRIGL SERPL-MCNC: 118 MG/DL
WBC: 13.6 THOU/UL (ref 4–11)

## 2020-02-06 ENCOUNTER — RECORDS - HEALTHEAST (OUTPATIENT)
Dept: ADMINISTRATIVE | Facility: OTHER | Age: 65
End: 2020-02-06

## 2020-02-11 ENCOUNTER — OFFICE VISIT - HEALTHEAST (OUTPATIENT)
Dept: FAMILY MEDICINE | Facility: CLINIC | Age: 65
End: 2020-02-11

## 2020-02-11 DIAGNOSIS — F19.982 DRUG-INDUCED INSOMNIA (H): ICD-10-CM

## 2020-02-11 DIAGNOSIS — A04.72 C. DIFFICILE COLITIS: ICD-10-CM

## 2020-02-11 DIAGNOSIS — I10 ESSENTIAL HYPERTENSION, BENIGN: ICD-10-CM

## 2020-02-11 DIAGNOSIS — E83.42 HYPOMAGNESEMIA: ICD-10-CM

## 2020-02-11 DIAGNOSIS — E78.5 HYPERLIPIDEMIA: ICD-10-CM

## 2020-02-11 DIAGNOSIS — L30.9 DERMATITIS: ICD-10-CM

## 2020-02-11 LAB
ALBUMIN SERPL-MCNC: 3.5 G/DL (ref 3.5–5)
ALP SERPL-CCNC: 112 U/L (ref 45–120)
ALT SERPL W P-5'-P-CCNC: 50 U/L (ref 0–45)
ANION GAP SERPL CALCULATED.3IONS-SCNC: 10 MMOL/L (ref 5–18)
AST SERPL W P-5'-P-CCNC: 20 U/L (ref 0–40)
BILIRUB SERPL-MCNC: 0.4 MG/DL (ref 0–1)
BUN SERPL-MCNC: 32 MG/DL (ref 8–22)
CALCIUM SERPL-MCNC: 9.3 MG/DL (ref 8.5–10.5)
CHLORIDE BLD-SCNC: 98 MMOL/L (ref 98–107)
CO2 SERPL-SCNC: 27 MMOL/L (ref 22–31)
CREAT SERPL-MCNC: 1.34 MG/DL (ref 0.7–1.3)
ERYTHROCYTE [DISTWIDTH] IN BLOOD BY AUTOMATED COUNT: 13.7 % (ref 11–14.5)
GFR SERPL CREATININE-BSD FRML MDRD: 54 ML/MIN/1.73M2
GLUCOSE BLD-MCNC: 134 MG/DL (ref 70–125)
HCT VFR BLD AUTO: 35.6 % (ref 40–54)
HGB BLD-MCNC: 12 G/DL (ref 14–18)
MAGNESIUM SERPL-MCNC: 1.7 MG/DL (ref 1.8–2.6)
MCH RBC QN AUTO: 27.9 PG (ref 27–34)
MCHC RBC AUTO-ENTMCNC: 33.7 G/DL (ref 32–36)
MCV RBC AUTO: 83 FL (ref 80–100)
PLATELET # BLD AUTO: 276 THOU/UL (ref 140–440)
PMV BLD AUTO: 6.3 FL (ref 7–10)
POTASSIUM BLD-SCNC: 5.1 MMOL/L (ref 3.5–5)
PROT SERPL-MCNC: 6.6 G/DL (ref 6–8)
RBC # BLD AUTO: 4.29 MILL/UL (ref 4.4–6.2)
SODIUM SERPL-SCNC: 135 MMOL/L (ref 136–145)
WBC: 9.3 THOU/UL (ref 4–11)

## 2020-02-13 ENCOUNTER — RECORDS - HEALTHEAST (OUTPATIENT)
Dept: ADMINISTRATIVE | Facility: OTHER | Age: 65
End: 2020-02-13

## 2020-03-03 ENCOUNTER — HOSPITAL ENCOUNTER (OUTPATIENT)
Dept: MRI IMAGING | Facility: CLINIC | Age: 65
Discharge: HOME | End: 2020-03-03
Attending: INTERNAL MEDICINE

## 2020-03-03 DIAGNOSIS — K52.9 COLITIS: ICD-10-CM

## 2020-03-03 DIAGNOSIS — Z98.890 HISTORY OF COLON SURGERY: ICD-10-CM

## 2020-03-03 DIAGNOSIS — Z86.19 HISTORY OF CLOSTRIDIOIDES DIFFICILE COLITIS: ICD-10-CM

## 2020-03-09 ENCOUNTER — RECORDS - HEALTHEAST (OUTPATIENT)
Dept: ADMINISTRATIVE | Facility: OTHER | Age: 65
End: 2020-03-09

## 2020-03-18 ENCOUNTER — OFFICE VISIT - HEALTHEAST (OUTPATIENT)
Dept: FAMILY MEDICINE | Facility: CLINIC | Age: 65
End: 2020-03-18

## 2020-03-18 DIAGNOSIS — I10 ESSENTIAL HYPERTENSION, BENIGN: ICD-10-CM

## 2020-03-18 DIAGNOSIS — R05.9 COUGH: ICD-10-CM

## 2020-03-18 DIAGNOSIS — A04.72 C. DIFFICILE COLITIS: ICD-10-CM

## 2020-07-15 ENCOUNTER — RECORDS - HEALTHEAST (OUTPATIENT)
Dept: ADMINISTRATIVE | Facility: OTHER | Age: 65
End: 2020-07-15

## 2020-08-03 ENCOUNTER — RECORDS - HEALTHEAST (OUTPATIENT)
Dept: ADMINISTRATIVE | Facility: OTHER | Age: 65
End: 2020-08-03

## 2020-09-02 ENCOUNTER — COMMUNICATION - HEALTHEAST (OUTPATIENT)
Dept: SCHEDULING | Facility: CLINIC | Age: 65
End: 2020-09-02

## 2020-09-02 DIAGNOSIS — F19.982 DRUG-INDUCED INSOMNIA (H): ICD-10-CM

## 2020-09-03 RX ORDER — ZOLPIDEM TARTRATE 10 MG/1
5-10 TABLET ORAL
Qty: 30 TABLET | Refills: 2 | Status: SHIPPED | OUTPATIENT
Start: 2020-09-03 | End: 2021-10-08

## 2020-09-18 ENCOUNTER — RECORDS - HEALTHEAST (OUTPATIENT)
Dept: ADMINISTRATIVE | Facility: OTHER | Age: 65
End: 2020-09-18

## 2020-09-21 ENCOUNTER — RECORDS - HEALTHEAST (OUTPATIENT)
Dept: ADMINISTRATIVE | Facility: OTHER | Age: 65
End: 2020-09-21

## 2020-09-24 ENCOUNTER — RECORDS - HEALTHEAST (OUTPATIENT)
Dept: ADMINISTRATIVE | Facility: OTHER | Age: 65
End: 2020-09-24

## 2020-09-24 LAB
ALT SERPL W/O P-5'-P-CCNC: 36 U/L (ref 0–78)
AST SERPL-CCNC: 28 U/L (ref 0–37)
CREAT SERPL-MCNC: 1.37 MG/DL (ref 0.7–1.3)

## 2020-10-01 ENCOUNTER — RECORDS - HEALTHEAST (OUTPATIENT)
Dept: HEALTH INFORMATION MANAGEMENT | Facility: CLINIC | Age: 65
End: 2020-10-01

## 2020-10-06 ENCOUNTER — OFFICE VISIT - HEALTHEAST (OUTPATIENT)
Dept: FAMILY MEDICINE | Facility: CLINIC | Age: 65
End: 2020-10-06

## 2020-10-06 DIAGNOSIS — E78.5 HYPERLIPIDEMIA LDL GOAL <100: ICD-10-CM

## 2020-10-06 DIAGNOSIS — G47.33 OBSTRUCTIVE SLEEP APNEA (ADULT) (PEDIATRIC): ICD-10-CM

## 2020-10-06 DIAGNOSIS — I10 ESSENTIAL HYPERTENSION, BENIGN: ICD-10-CM

## 2020-10-06 DIAGNOSIS — E80.6 HYPERBILIRUBINEMIA: ICD-10-CM

## 2020-10-06 DIAGNOSIS — Z00.00 ROUTINE GENERAL MEDICAL EXAMINATION AT A HEALTH CARE FACILITY: ICD-10-CM

## 2020-10-06 DIAGNOSIS — Z12.5 SCREENING FOR PROSTATE CANCER: ICD-10-CM

## 2020-10-06 LAB
ALBUMIN SERPL-MCNC: 3.9 G/DL (ref 3.5–5)
ALP SERPL-CCNC: 67 U/L (ref 45–120)
ALT SERPL W P-5'-P-CCNC: 32 U/L (ref 0–45)
ANION GAP SERPL CALCULATED.3IONS-SCNC: 8 MMOL/L (ref 5–18)
AST SERPL W P-5'-P-CCNC: 27 U/L (ref 0–40)
BILIRUB SERPL-MCNC: 0.7 MG/DL (ref 0–1)
BUN SERPL-MCNC: 19 MG/DL (ref 8–22)
CALCIUM SERPL-MCNC: 9.1 MG/DL (ref 8.5–10.5)
CHLORIDE BLD-SCNC: 103 MMOL/L (ref 98–107)
CHOLEST SERPL-MCNC: 250 MG/DL
CO2 SERPL-SCNC: 29 MMOL/L (ref 22–31)
CREAT SERPL-MCNC: 1.17 MG/DL (ref 0.7–1.3)
FASTING STATUS PATIENT QL REPORTED: YES
GFR SERPL CREATININE-BSD FRML MDRD: >60 ML/MIN/1.73M2
GLUCOSE BLD-MCNC: 86 MG/DL (ref 70–125)
HDLC SERPL-MCNC: 49 MG/DL
LDLC SERPL CALC-MCNC: 147 MG/DL
POTASSIUM BLD-SCNC: 4.8 MMOL/L (ref 3.5–5)
PROT SERPL-MCNC: 7.1 G/DL (ref 6–8)
PSA SERPL-MCNC: 0.7 NG/ML (ref 0–4.5)
SODIUM SERPL-SCNC: 140 MMOL/L (ref 136–145)
TRIGL SERPL-MCNC: 268 MG/DL

## 2020-10-06 ASSESSMENT — MIFFLIN-ST. JEOR: SCORE: 1924.47

## 2020-10-09 ENCOUNTER — COMMUNICATION - HEALTHEAST (OUTPATIENT)
Dept: FAMILY MEDICINE | Facility: CLINIC | Age: 65
End: 2020-10-09

## 2020-10-09 ENCOUNTER — RECORDS - HEALTHEAST (OUTPATIENT)
Dept: ADMINISTRATIVE | Facility: OTHER | Age: 65
End: 2020-10-09

## 2020-10-12 ENCOUNTER — AMBULATORY - HEALTHEAST (OUTPATIENT)
Dept: FAMILY MEDICINE | Facility: CLINIC | Age: 65
End: 2020-10-12

## 2020-10-12 DIAGNOSIS — E78.5 HYPERLIPIDEMIA LDL GOAL <100: ICD-10-CM

## 2020-11-09 ENCOUNTER — COMMUNICATION - HEALTHEAST (OUTPATIENT)
Dept: SCHEDULING | Facility: CLINIC | Age: 65
End: 2020-11-09

## 2020-11-11 ENCOUNTER — OFFICE VISIT - HEALTHEAST (OUTPATIENT)
Dept: CARDIOLOGY | Facility: CLINIC | Age: 65
End: 2020-11-11

## 2020-11-11 DIAGNOSIS — I25.9 CHEST PAIN DUE TO MYOCARDIAL ISCHEMIA, UNSPECIFIED ISCHEMIC CHEST PAIN TYPE: ICD-10-CM

## 2020-11-11 DIAGNOSIS — E78.5 HYPERLIPIDEMIA LDL GOAL <100: ICD-10-CM

## 2020-11-11 DIAGNOSIS — I10 ESSENTIAL HYPERTENSION, BENIGN: ICD-10-CM

## 2020-11-11 DIAGNOSIS — E66.01 MORBID OBESITY (H): ICD-10-CM

## 2020-11-11 ASSESSMENT — MIFFLIN-ST. JEOR: SCORE: 1973

## 2020-11-13 ENCOUNTER — OFFICE VISIT - HEALTHEAST (OUTPATIENT)
Dept: FAMILY MEDICINE | Facility: CLINIC | Age: 65
End: 2020-11-13

## 2020-11-13 DIAGNOSIS — R53.83 FATIGUE, UNSPECIFIED TYPE: ICD-10-CM

## 2020-11-13 DIAGNOSIS — T78.40XA ALLERGIC REACTION, INITIAL ENCOUNTER: ICD-10-CM

## 2020-11-17 ENCOUNTER — RECORDS - HEALTHEAST (OUTPATIENT)
Dept: ADMINISTRATIVE | Facility: OTHER | Age: 65
End: 2020-11-17

## 2020-11-18 ENCOUNTER — HOSPITAL ENCOUNTER (OUTPATIENT)
Dept: NUCLEAR MEDICINE | Facility: CLINIC | Age: 65
Discharge: HOME | End: 2020-11-18
Attending: INTERNAL MEDICINE

## 2020-11-18 ENCOUNTER — HOSPITAL ENCOUNTER (OUTPATIENT)
Dept: CARDIOLOGY | Facility: CLINIC | Age: 65
Discharge: HOME | End: 2020-11-18
Attending: INTERNAL MEDICINE

## 2020-11-18 DIAGNOSIS — I25.9 CHEST PAIN DUE TO MYOCARDIAL ISCHEMIA, UNSPECIFIED ISCHEMIC CHEST PAIN TYPE: ICD-10-CM

## 2020-11-18 LAB
CV STRESS CURRENT BP HE: NORMAL
CV STRESS CURRENT HR HE: 100
CV STRESS CURRENT HR HE: 104
CV STRESS CURRENT HR HE: 106
CV STRESS CURRENT HR HE: 106
CV STRESS CURRENT HR HE: 107
CV STRESS CURRENT HR HE: 109
CV STRESS CURRENT HR HE: 120
CV STRESS CURRENT HR HE: 124
CV STRESS CURRENT HR HE: 127
CV STRESS CURRENT HR HE: 130
CV STRESS CURRENT HR HE: 131
CV STRESS CURRENT HR HE: 133
CV STRESS CURRENT HR HE: 133
CV STRESS CURRENT HR HE: 138
CV STRESS CURRENT HR HE: 138
CV STRESS CURRENT HR HE: 140
CV STRESS CURRENT HR HE: 75
CV STRESS CURRENT HR HE: 85
CV STRESS CURRENT HR HE: 89
CV STRESS CURRENT HR HE: 94
CV STRESS CURRENT HR HE: 94
CV STRESS CURRENT HR HE: 95
CV STRESS CURRENT HR HE: 96
CV STRESS DEVIATION TIME HE: NORMAL
CV STRESS ECHO PERCENT HR HE: NORMAL
CV STRESS EXERCISE STAGE HE: NORMAL
CV STRESS EXERCISE STAGE REACHED HE: NORMAL
CV STRESS FINAL RESTING BP HE: NORMAL
CV STRESS FINAL RESTING HR HE: 94
CV STRESS MAX HR HE: 140
CV STRESS MAX TREADMILL GRADE HE: 14
CV STRESS MAX TREADMILL SPEED HE: 3.4
CV STRESS PEAK DIA BP HE: NORMAL
CV STRESS PEAK SYS BP HE: NORMAL
CV STRESS PHASE HE: NORMAL
CV STRESS PROTOCOL HE: NORMAL
CV STRESS RESTING PT POSITION HE: NORMAL
CV STRESS RESTING PT POSITION HE: NORMAL
CV STRESS ST DEVIATION AMOUNT HE: NORMAL
CV STRESS ST DEVIATION ELEVATION HE: NORMAL
CV STRESS ST EVELATION AMOUNT HE: NORMAL
CV STRESS TEST TYPE HE: NORMAL
CV STRESS TOTAL STAGE TIME MIN 1 HE: NORMAL
NUC REST EJECTION FRACTION: 74 %
RATE PRESSURE PRODUCT: NORMAL
STRESS ECHO BASELINE DIASTOLIC HE: 88
STRESS ECHO BASELINE HR: 76
STRESS ECHO BASELINE SYSTOLIC BP: 174
STRESS ECHO CALCULATED PERCENT HR: 89 %
STRESS ECHO LAST STRESS DIASTOLIC BP: 78
STRESS ECHO LAST STRESS HR: 138
STRESS ECHO LAST STRESS SYSTOLIC BP: 208
STRESS ECHO POST ESTIMATED WORKLOAD: 7.3 METS
STRESS ECHO POST EXERCISE DUR MIN: 6 MIN
STRESS ECHO POST EXERCISE DUR SEC: 5 SEC
STRESS ECHO TARGET HR: 157

## 2020-12-04 ENCOUNTER — RECORDS - HEALTHEAST (OUTPATIENT)
Dept: ADMINISTRATIVE | Facility: OTHER | Age: 65
End: 2020-12-04

## 2020-12-08 ENCOUNTER — OFFICE VISIT - HEALTHEAST (OUTPATIENT)
Dept: FAMILY MEDICINE | Facility: CLINIC | Age: 65
End: 2020-12-08

## 2020-12-08 DIAGNOSIS — T78.40XA ALLERGIC REACTION, INITIAL ENCOUNTER: ICD-10-CM

## 2020-12-08 DIAGNOSIS — E78.5 HYPERLIPIDEMIA LDL GOAL <100: ICD-10-CM

## 2020-12-08 DIAGNOSIS — F32.A DEPRESSION, UNSPECIFIED DEPRESSION TYPE: ICD-10-CM

## 2020-12-08 ASSESSMENT — PATIENT HEALTH QUESTIONNAIRE - PHQ9: SUM OF ALL RESPONSES TO PHQ QUESTIONS 1-9: 7

## 2020-12-09 ENCOUNTER — COMMUNICATION - HEALTHEAST (OUTPATIENT)
Dept: FAMILY MEDICINE | Facility: CLINIC | Age: 65
End: 2020-12-09

## 2020-12-09 DIAGNOSIS — E78.5 HYPERLIPIDEMIA LDL GOAL <100: ICD-10-CM

## 2020-12-10 RX ORDER — PRAVASTATIN SODIUM 20 MG
20 TABLET ORAL EVERY EVENING
Qty: 30 TABLET | Refills: 11 | Status: SHIPPED | OUTPATIENT
Start: 2020-12-10 | End: 2021-12-10

## 2021-01-05 ENCOUNTER — OFFICE VISIT - HEALTHEAST (OUTPATIENT)
Dept: FAMILY MEDICINE | Facility: CLINIC | Age: 66
End: 2021-01-05

## 2021-01-05 DIAGNOSIS — F32.A DEPRESSION, UNSPECIFIED DEPRESSION TYPE: ICD-10-CM

## 2021-01-05 DIAGNOSIS — E78.5 HYPERLIPIDEMIA LDL GOAL <100: ICD-10-CM

## 2021-02-03 ENCOUNTER — COMMUNICATION - HEALTHEAST (OUTPATIENT)
Dept: ADMINISTRATIVE | Facility: CLINIC | Age: 66
End: 2021-02-03

## 2021-03-11 ENCOUNTER — COMMUNICATION - HEALTHEAST (OUTPATIENT)
Dept: FAMILY MEDICINE | Facility: CLINIC | Age: 66
End: 2021-03-11

## 2021-03-11 DIAGNOSIS — F32.A DEPRESSION, UNSPECIFIED DEPRESSION TYPE: ICD-10-CM

## 2021-03-11 RX ORDER — ESCITALOPRAM OXALATE 10 MG/1
10 TABLET ORAL DAILY
Qty: 30 TABLET | Refills: 2 | Status: SHIPPED | OUTPATIENT
Start: 2021-03-11 | End: 2021-10-08

## 2021-03-18 ENCOUNTER — RECORDS - HEALTHEAST (OUTPATIENT)
Dept: ADMINISTRATIVE | Facility: OTHER | Age: 66
End: 2021-03-18

## 2021-03-18 LAB
ALT SERPL W/O P-5'-P-CCNC: 42 U/L (ref 0–78)
AST SERPL-CCNC: 29 U/L (ref 0–37)
CREAT SERPL-MCNC: 1.29 MG/DL (ref 0.7–1.3)
GFR ESTIMATE EXT - HISTORICAL: 60 ML/MIN/1.73M2
GFR ESTIMATE, IF BLACK EXT - HISTORICAL: >60 ML/MIN/1.73M2

## 2021-03-19 ENCOUNTER — RECORDS - HEALTHEAST (OUTPATIENT)
Dept: ADMINISTRATIVE | Facility: OTHER | Age: 66
End: 2021-03-19

## 2021-03-25 ENCOUNTER — RECORDS - HEALTHEAST (OUTPATIENT)
Dept: HEALTH INFORMATION MANAGEMENT | Facility: CLINIC | Age: 66
End: 2021-03-25

## 2021-04-01 ENCOUNTER — RECORDS - HEALTHEAST (OUTPATIENT)
Dept: ADMINISTRATIVE | Facility: OTHER | Age: 66
End: 2021-04-01

## 2021-05-24 ENCOUNTER — COMMUNICATION - HEALTHEAST (OUTPATIENT)
Dept: SCHEDULING | Facility: CLINIC | Age: 66
End: 2021-05-24

## 2021-05-24 ENCOUNTER — OFFICE VISIT - HEALTHEAST (OUTPATIENT)
Dept: FAMILY MEDICINE | Facility: CLINIC | Age: 66
End: 2021-05-24

## 2021-05-24 DIAGNOSIS — J20.9 ACUTE BRONCHITIS, UNSPECIFIED ORGANISM: ICD-10-CM

## 2021-05-24 DIAGNOSIS — R31.9 HEMATURIA, UNSPECIFIED TYPE: ICD-10-CM

## 2021-05-24 DIAGNOSIS — R05.9 COUGH: ICD-10-CM

## 2021-05-24 LAB
ALBUMIN SERPL-MCNC: 3.8 G/DL (ref 3.5–5)
ALBUMIN UR-MCNC: ABNORMAL G/DL
ALP SERPL-CCNC: 65 U/L (ref 45–120)
ALT SERPL W P-5'-P-CCNC: 26 U/L (ref 0–45)
AMORPH CRY #/AREA URNS HPF: ABNORMAL /[HPF]
ANION GAP SERPL CALCULATED.3IONS-SCNC: 14 MMOL/L (ref 5–18)
APPEARANCE UR: ABNORMAL
AST SERPL W P-5'-P-CCNC: 25 U/L (ref 0–40)
BACTERIA #/AREA URNS HPF: ABNORMAL /[HPF]
BASOPHILS # BLD AUTO: 0 THOU/UL (ref 0–0.2)
BASOPHILS NFR BLD AUTO: 1 % (ref 0–2)
BILIRUB SERPL-MCNC: 1 MG/DL (ref 0–1)
BILIRUB UR QL STRIP: ABNORMAL
BUN SERPL-MCNC: 20 MG/DL (ref 8–22)
CALCIUM SERPL-MCNC: 9.2 MG/DL (ref 8.5–10.5)
CHLORIDE BLD-SCNC: 102 MMOL/L (ref 98–107)
CO2 SERPL-SCNC: 22 MMOL/L (ref 22–31)
COLOR UR AUTO: ABNORMAL
CREAT SERPL-MCNC: 1.21 MG/DL (ref 0.7–1.3)
DEPRECATED S PYO AG THROAT QL EIA: NORMAL
EOSINOPHIL # BLD AUTO: 0.2 THOU/UL (ref 0–0.4)
EOSINOPHIL NFR BLD AUTO: 3 % (ref 0–6)
ERYTHROCYTE [DISTWIDTH] IN BLOOD BY AUTOMATED COUNT: 13 % (ref 11–14.5)
GFR SERPL CREATININE-BSD FRML MDRD: >60 ML/MIN/1.73M2
GLUCOSE BLD-MCNC: 95 MG/DL (ref 70–125)
GLUCOSE UR STRIP-MCNC: NEGATIVE MG/DL
GROUP A STREP BY PCR: NORMAL
HCT VFR BLD AUTO: 49.3 % (ref 40–54)
HGB BLD-MCNC: 16.5 G/DL (ref 14–18)
HGB UR QL STRIP: ABNORMAL
IMM GRANULOCYTES # BLD: 0 THOU/UL
IMM GRANULOCYTES NFR BLD: 1 %
KETONES UR STRIP-MCNC: NEGATIVE MG/DL
LEUKOCYTE ESTERASE UR QL STRIP: NEGATIVE
LYMPHOCYTES # BLD AUTO: 0.9 THOU/UL (ref 0.8–4.4)
LYMPHOCYTES NFR BLD AUTO: 18 % (ref 20–40)
MCH RBC QN AUTO: 28.8 PG (ref 27–34)
MCHC RBC AUTO-ENTMCNC: 33.5 G/DL (ref 32–36)
MCV RBC AUTO: 86 FL (ref 80–100)
MONOCYTES # BLD AUTO: 0.6 THOU/UL (ref 0–0.9)
MONOCYTES NFR BLD AUTO: 12 % (ref 2–10)
NEUTROPHILS # BLD AUTO: 3.4 THOU/UL (ref 2–7.7)
NEUTROPHILS NFR BLD AUTO: 66 % (ref 50–70)
NITRATE UR QL: NEGATIVE
PH UR STRIP: 5.5 [PH] (ref 5–8)
PLATELET # BLD AUTO: 160 THOU/UL (ref 140–440)
PMV BLD AUTO: 8.8 FL (ref 7–10)
POTASSIUM BLD-SCNC: 4.4 MMOL/L (ref 3.5–5)
PROT SERPL-MCNC: 7.3 G/DL (ref 6–8)
RBC # BLD AUTO: 5.73 MILL/UL (ref 4.4–6.2)
RBC #/AREA URNS AUTO: >100 HPF
SODIUM SERPL-SCNC: 138 MMOL/L (ref 136–145)
SP GR UR STRIP: >=1.03 (ref 1–1.03)
SQUAMOUS #/AREA URNS AUTO: ABNORMAL LPF
UROBILINOGEN UR STRIP-ACNC: ABNORMAL
WBC #/AREA URNS AUTO: ABNORMAL HPF
WBC: 5.2 THOU/UL (ref 4–11)

## 2021-05-24 RX ORDER — BENZONATATE 100 MG/1
100 CAPSULE ORAL EVERY 6 HOURS PRN
Qty: 30 CAPSULE | Refills: 0 | Status: SHIPPED | OUTPATIENT
Start: 2021-05-24 | End: 2021-10-08

## 2021-05-25 LAB
BACTERIA SPEC CULT: NO GROWTH
SARS-COV-2 PCR COMMENT: NORMAL
SARS-COV-2 RNA SPEC QL NAA+PROBE: NEGATIVE
SARS-COV-2 VIRUS SPECIMEN SOURCE: NORMAL

## 2021-05-26 ENCOUNTER — COMMUNICATION - HEALTHEAST (OUTPATIENT)
Dept: SCHEDULING | Facility: CLINIC | Age: 66
End: 2021-05-26

## 2021-05-27 ENCOUNTER — RECORDS - HEALTHEAST (OUTPATIENT)
Dept: ADMINISTRATIVE | Facility: CLINIC | Age: 66
End: 2021-05-27

## 2021-05-27 ASSESSMENT — PATIENT HEALTH QUESTIONNAIRE - PHQ9: SUM OF ALL RESPONSES TO PHQ QUESTIONS 1-9: 7

## 2021-05-27 NOTE — PROGRESS NOTES
Bedside EEG was performed that included photic stimulation; hyperventilation was deferred. The patient was awake and drowsy throughout the recording.

## 2021-05-29 ENCOUNTER — RECORDS - HEALTHEAST (OUTPATIENT)
Dept: ADMINISTRATIVE | Facility: CLINIC | Age: 66
End: 2021-05-29

## 2021-05-30 VITALS — BODY MASS INDEX: 34.21 KG/M2 | WEIGHT: 231 LBS | HEIGHT: 69 IN

## 2021-05-30 VITALS — WEIGHT: 244 LBS | HEIGHT: 70 IN | BODY MASS INDEX: 34.93 KG/M2

## 2021-05-31 ENCOUNTER — RECORDS - HEALTHEAST (OUTPATIENT)
Dept: ADMINISTRATIVE | Facility: CLINIC | Age: 66
End: 2021-05-31

## 2021-05-31 VITALS — BODY MASS INDEX: 35.12 KG/M2 | WEIGHT: 245.3 LBS | HEIGHT: 70 IN

## 2021-05-31 VITALS — WEIGHT: 244 LBS | BODY MASS INDEX: 34.93 KG/M2 | HEIGHT: 70 IN

## 2021-06-01 VITALS — WEIGHT: 242 LBS | BODY MASS INDEX: 34.65 KG/M2 | HEIGHT: 70 IN

## 2021-06-01 VITALS — WEIGHT: 237 LBS | BODY MASS INDEX: 33.93 KG/M2 | HEIGHT: 70 IN

## 2021-06-01 VITALS — WEIGHT: 239.5 LBS | BODY MASS INDEX: 34.36 KG/M2

## 2021-06-01 VITALS — WEIGHT: 241 LBS | HEIGHT: 70 IN | BODY MASS INDEX: 34.5 KG/M2

## 2021-06-01 NOTE — PROGRESS NOTES
OFFICE VISIT NOTE      Assessment & Plan   Jay Roth is a 64 y.o. male with RLQ abd pain which he and I presume is diverticulitis. He will take augmentin, rest, hydrate well and see if he improves. If he spikes a fever or has worse pain, he will seek a re-check in the ER.      Diagnoses and all orders for this visit:    Diverticulitis of colon  -     amoxicillin-clavulanate (AUGMENTIN) 875-125 mg per tablet; Take 1 tablet by mouth 2 (two) times a day for 7 days.  Dispense: 28 tablet; Refill: 0    Abdominal pain, right lower quadrant  -     HM1(CBC and Differential)  -     Comprehensive Metabolic Panel  -     Cancel: Urinalysis-UC if Indicated  -     HM1 (CBC with Diff)  -     Erythrocyte Sedimentation Rate  -     Urine,Microscopic  -     Culture, Urine  -     amoxicillin-clavulanate (AUGMENTIN) 875-125 mg per tablet; Take 1 tablet by mouth 2 (two) times a day for 7 days.  Dispense: 28 tablet; Refill: 0        Yee La MD    The following high BMI interventions were performed this visit: encouragement to exercise          Subjective:   Chief Complaint:  Abdominal Pain (centered on the right side, suspected diverculities due to having same pain years ago. did not want to wait days before being seen)    64 y.o. male.     1) right lower belly pain started at 4am yesterday AM - woke him up. Hurt all day, worst 2-4pm.    Not hungry    Last BM was today    H/o 2 episodes of diverticullitis  Never had his appendix out    2) just healing from a chemical peel on his scalp for skin cancer    Current Outpatient Medications   Medication Sig Note     amLODIPine (NORVASC) 10 MG tablet Take 1 tablet (10 mg total) by mouth daily.      amoxicillin-clavulanate (AUGMENTIN) 875-125 mg per tablet Take 1 tablet by mouth 2 (two) times a day for 7 days.      atorvastatin (LIPITOR) 20 MG tablet Take 1 tablet (20 mg total) by mouth daily.      betamethasone dipropionate (DIPROLENE) 0.05 % ointment APPLY SPARINGLY EXTERNALLY TO  "THE AFFECTED AREA TWICE DAILY      celecoxib (CELEBREX) 200 MG capsule TK 1 C PO BID 5/4/2016: Received from: External Pharmacy     ketoconazole (NIZORAL) 2 % shampoo  5/4/2016: Received from: External Pharmacy     oxyCODONE-acetaminophen (PERCOCET) 5-325 mg per tablet TK 1 TO 2 TS PO Q FOUR H PRN Pain      traZODone (DESYREL) 100 MG tablet Take 1 tablet (100 mg total) by mouth bedtime.        PSFHx: appropriate sections of PMH completed/filled in   Tobacco Status:  He  reports that he quit smoking about 29 years ago. His smoking use included cigarettes. His smokeless tobacco use includes chew.    Review of Systems:  No fever.  No diarrhea. All other systems negative except as noted above.    Objective:    /80 (Patient Site: Left Arm, Patient Position: Sitting, Cuff Size: Adult Regular)   Pulse 92   Temp 99.2  F (37.3  C) (Oral)   Resp 16   Ht 5' 10\" (1.778 m)   Wt (!) 236 lb 4 oz (107.2 kg)   BMI 33.90 kg/m    GENERAL: No acute distress, sitting comfortably, protruberant abd  Ht: reg s1s2  Lungs: clear  Abd: +BS, soft, tender in the umbilical area and RLQ, no masses felt, + percussion tenderness, neg rovsing's sign.    CBC shows slightly elevated WBC  Spent 25 min face to face with patient with more the 50% spent in counseling, education and coordination of care and discussing diverticulitis, treatment, recovery, hydration.    "

## 2021-06-01 NOTE — PROGRESS NOTES
Assessment/Plan:     1. Routine general medical examination at a health care facility  Routine healthcare maintenance.  Preventative cares reviewed.  Hepatitis C and PSA screen based on age criteria.  Will receive flu shot however not currently due to illness with presumed diverticulitis.  Annual physical exams to continue.  - Hepatitis C Antibody (Anti-HCV)  - PSA (Prostatic-Specific Antigen), Annual Screen    2. Class 1 obesity due to excess calories without serious comorbidity with body mass index (BMI) of 34.0 to 34.9 in adult  Dietary and exercise modification for weight goal less than 220 pounds initially, less than 210 pounds ideally.    3. Benign Essential Hypertension  Hypertension, stable.  Amlodipine 10 mg daily.  Patient may consider decreasing dose due to trace peripheral edema however if able to continue to tolerate 10 mg dose that would be my recommendation with blood pressure 116/74 today on recheck.  - Comprehensive Metabolic Panel    4. Hyperlipidemia LDL goal <100  Check lipid cascade today while fasting.  Atorvastatin 20 mg daily continues.  - Lipid Cascade    5. Smokeless tobacco use  Smokeless tobacco use.  Will prescribe Chantix as directed x12 weeks to assist with tobacco cessation.  - varenicline (CHANTIX CHERI) 0.5 mg (11)- 1 mg (42) tablet; Take  0.5mg once daily for 3 days, then 0.5mg twice daily for 3 days, then 1mg twice daily.  Dispense: 60 tablet; Refill: 2    6. Acute diverticulitis  Presumed diverticulitis with history diverticulitis described.  CT due to persistent concerns and will extend Cipro and Flagyl dosing from 7 days up to 14 days.  Repeat CBC, CRP and conference of metabolic panel.  - Comprehensive Metabolic Panel  - HM2(CBC w/o Differential)  - C-Reactive Protein  - ciprofloxacin HCl (CIPRO) 500 MG tablet; Take 1 tablet (500 mg total) by mouth 2 (two) times a day for 7 days.  Dispense: 14 tablet; Refill: 0  - metroNIDAZOLE (FLAGYL) 500 MG tablet; Take 1 tablet (500 mg  total) by mouth 3 (three) times a day for 7 days.  Dispense: 21 tablet; Refill: 0  - CT Abdomen Pelvis With Oral With IV Contrast; Future    7. Hyperbilirubinemia  Mild bilirubin elevation historically 1.6.  Ensure stable.  - Comprehensive Metabolic Panel    8. History of Bell's palsy  History of Bell's palsy, resolved.    9. Chronic midline low back pain without sciatica  Chronic lower back pain.  Has had radiofrequency ablation procedure x3.  Awaiting authorization to work comp for repeat treatment.  Oxycodone as needed filled through pain specialist.    I have had an Advance Directives discussion with the patient.  The following high BMI interventions were performed this visit: encouragement to exercise, weight monitoring, weight loss from baseline weight and lifestyle education regarding diet.  Ensure ongoing efforts to achieve weight goal < 220 pounds initially, < 210 pounds ideally.         Subjective:      Jay Roth is a 64 y.o. male who presents for an annual exam.  Recent diverticulitis and was seen September 4, 2019.  Treated with Augmentin.  Was out in [place] for his son's wedding last week and then had a flare again.  Switch to Cipro and Flagyl x7 days.  Somewhat better however pain persists.  Has not had CT abdomen and well over 5 years likely.  No history of hospitalization for diverticulitis.  Does have underlying hypertension treated with amlodipine 10 mg daily.  Atorvastatin 20 mg daily for lipid management.  1 tin smokeless tobacco lasting about 3 days.  Did quit previously from smoking with use of Chantix or similar agent apparently.  Does not tolerate CPAP for JIGNA.  Does get up 3-4 times per night to urinate however feels that this is associate with fluid intake.  No urinary hesitancy or postvoid dribbling concerns.  No chest pain.  No orthopnea or PND.  Somewhat decreased appetite.  No significant bloating currently.  Comprehensive review of systems as above otherwise all  "negative.     - Lovely  3 children - 2 sons, 1 daughter  4 grandchildren (expecting 5th...)  Quit smoking ~ 20 years  Chew 1 tin per 3 days  EtOH: occ  Mom -  84 kidney failure  Dad -  86 \"wore out\"  2 bros -   1 sis -   Surgeries: knee arthroscopy bilateral; left inguinal hernia   Hospitalizations: none  Work: retired as beer distributor (L1-L4 degenerative back issues) - oxycodone if walk, etc.  Hobbies: swim 5x/week, fishing    Healthy Habits:   Regular Exercise: Yes and swim 5x/week at [facility]  Healthy Diet: No  Dental Visits Regularly: Yes  Seat Belt: Yes   Sexually active: Yes  Colonoscopy: Yes and 20  Lipid Profile: Yes  Glucose Screen: Yes    Immunization History   Administered Date(s) Administered     Hep A, historic 2010, 2011     Td,adult,historic,unspecified 2006     Tdap 2011     ZOSTER, LIVE 2016     ZOSTER, RECOMBINANT, IM 2018, 2018     Immunization status: up to date and documented, will receive flu shot once feeling better.  Vision Screening:both eyes  Hearing: PASS     Current Outpatient Medications   Medication Sig Dispense Refill     amLODIPine (NORVASC) 10 MG tablet Take 1 tablet (10 mg total) by mouth daily. 90 tablet 3     atorvastatin (LIPITOR) 20 MG tablet Take 1 tablet (20 mg total) by mouth daily. 90 tablet 3     betamethasone dipropionate (DIPROLENE) 0.05 % ointment APPLY SPARINGLY EXTERNALLY TO THE AFFECTED AREA TWICE DAILY 30 g 0     celecoxib (CELEBREX) 200 MG capsule TK 1 C PO BID  2     oxyCODONE-acetaminophen (PERCOCET) 5-325 mg per tablet TK 1 TO 2 TS PO Q FOUR H PRN Pain 30 tablet 0     traZODone (DESYREL) 100 MG tablet Take 1 tablet (100 mg total) by mouth bedtime. 90 tablet 0     ciprofloxacin HCl (CIPRO) 500 MG tablet Take 1 tablet (500 mg total) by mouth 2 (two) times a day for 7 days. 14 tablet 0     metroNIDAZOLE (FLAGYL) 500 MG tablet Take 1 tablet (500 mg total) by mouth 3 (three) times a day for 7 days. 21 " tablet 0     varenicline (CHANTIX CHERI) 0.5 mg (11)- 1 mg (42) tablet Take  0.5mg once daily for 3 days, then 0.5mg twice daily for 3 days, then 1mg twice daily. 60 tablet 2     No current facility-administered medications for this visit.      History reviewed. No pertinent past medical history.  Past Surgical History:   Procedure Laterality Date     DE KNEE SCOPE,MED OR LAT MENIS REPAIR      Description: Knee Arthroscopy With Medial Meniscus Repair;  Recorded: 2009;     Patient has no known allergies.  No family history on file.  Social History     Socioeconomic History     Marital status:      Spouse name: Not on file     Number of children: Not on file     Years of education: Not on file     Highest education level: Not on file   Occupational History     Not on file   Social Needs     Financial resource strain: Not on file     Food insecurity:     Worry: Not on file     Inability: Not on file     Transportation needs:     Medical: Not on file     Non-medical: Not on file   Tobacco Use     Smoking status: Former Smoker     Types: Cigarettes     Last attempt to quit: 1990     Years since quittin.7     Smokeless tobacco: Current User     Types: Chew   Substance and Sexual Activity     Alcohol use: Yes     Drug use: No     Sexual activity: Yes     Partners: Female   Lifestyle     Physical activity:     Days per week: Not on file     Minutes per session: Not on file     Stress: Not on file   Relationships     Social connections:     Talks on phone: Not on file     Gets together: Not on file     Attends [employer] service: Not on file     Active member of club or organization: Not on file     Attends meetings of clubs or organizations: Not on file     Relationship status: Not on file     Intimate partner violence:     Fear of current or ex partner: Not on file     Emotionally abused: Not on file     Physically abused: Not on file     Forced sexual activity: Not on file   Other Topics Concern     Not  "on file   Social History Narrative     Not on file       Review of Systems  Comprehensive ROS: as above, otherwise all negative.           Objective:     /74   Pulse 71   Ht 5' 9.25\" (1.759 m)   Wt (!) 234 lb (106.1 kg)   SpO2 97%   BMI 34.31 kg/m    Body mass index is 34.31 kg/m .    Physical    General Appearance:    Alert, cooperative, no distress, appears stated age.  Obesity.   Head:    Normocephalic, without obvious abnormality, atraumatic   Eyes:    PERRL, conjunctiva/corneas clear, EOM's intact, fundi     benign, both eyes.  Wears eyeglasses (not currently)        Ears:    Normal TM's and external ear canals, both ears   Nose:   Nares normal, septum midline, mucosa normal, no drainage    or sinus tenderness   Throat:   Lips, mucosa, and tongue normal; teeth and gums normal   Neck:   Supple, symmetrical, trachea midline, no adenopathy;        thyroid:  No enlargement/tenderness/nodules; no carotid    bruit or JVD   Back:     Symmetric, no curvature, ROM normal, no CVA tenderness   Lungs:     Clear to auscultation bilaterally, respirations unlabored   Chest wall:    No tenderness or deformity   Heart:    Regular rate and rhythm, S1 and S2 normal, no murmur, rub   or gallop   Abdomen:     Soft, bowel sounds active all four quadrants,     no masses, no organomegaly.  Mild tenderness left greater than right side abdomen without guarding or rebound.   Genitalia:    Normal male without lesion, discharge or tenderness.  No inguinal hernia noted.     Rectal:    Normal tone.  Prostate normal/symmetric, no masses or tenderness.   Extremities:   Extremities normal, atraumatic, no cyanosis or edema   Pulses:   2+ and symmetric all extremities   Skin:   Skin color, texture, turgor normal, no rashes or lesions   Lymph nodes:   Cervical, supraclavicular, and axillary nodes normal   Neurologic:   CNII-XII intact. Normal strength, sensation and reflexes       throughout                This note has been dictated " using voice recognition software and as a result may contain minor grammatical errors and unintended word substitutions.

## 2021-06-01 NOTE — TELEPHONE ENCOUNTER
Reviewed screening Hep C Ab positive.  Hep C RNA quantitative pending.  Reviewed recent abdominal CT results showing mild uncomplicated diverticulitis as well as possible small right inguinal hernia.  Patient declines referral currently to see general surgeon.  Will call back if ongoing concerns noted.

## 2021-06-01 NOTE — TELEPHONE ENCOUNTER
Triage note:    64 year old male called with concerns about persistent intermittent LLQ abdominal pain and has questions about Hep C lab result.     He originally was treated for diverticulitis on 9/4/19 then he developed symptom again on 9/19/19. He was seen 9/26/19 and was given one week of Flagyl and Cipro.  In the past, the pain goes away within 24 hours, but his symptoms have persisted.  He continues to have intermittent abdominal cramping in the left lower abdomen.   Overall the pain is the same as it has been the last week, but he feels more tired today.  No diarrhea. No bloating. He takes opiates for back.pain.  Last BM was normal this morning.  No fever.  RN reviewed MD lab result note regarding Hep C with patient.     He wanted to touch base with PCP to see what he advises because he is going out of town for one week tomorrow morning.   Please advise!        *Ok to leave a detailed message upon call back    Capri Freeman RN, [facility] Refill/Triage, 10/1/2019 2:22 PM    Reason for Disposition    Nursing judgment    Protocols used: NO PROTOCOL AVAILABLE - SICK ADULT-A-[place]

## 2021-06-02 NOTE — TELEPHONE ENCOUNTER
"RN Triage:     Patient calling in stating he was seen on 9/4 for abdominal pain and 9/26 for annual exam.   2 weeks ago for diverticulitis and was on 2 antibiotics (cipro ad flagyl). Patient finished the antibiotics and was feeling ok. The diarrhea started last night, pain on the left side he stated it feels like cramping. He reported the pain is 3\" above umbilicus. Pain is a 2/10. 10 episodes of diarrhea since last night. He is color blind and he thinks it might have blood with wiping. He did not say he could see any blood in the stool. NO abdominal bloating Patient offered an appointment today he declined. Patient stated if PCP wanted to see him again he would come in but is asking for a referral.     He is looking for a referral to a GI. What is the next step?    Please advise.    Cat Estevez RN, BSN [facility] Triage Nurse    Reason for Disposition    SEVERE diarrhea (e.g., 7 or more times / day more than normal) and present > 24 hours (1 day)    Protocols used: DIARRHEA-A-[place]      "

## 2021-06-02 NOTE — TELEPHONE ENCOUNTER
Refill Approved    Rx renewed per Medication Renewal Policy. Medication was last renewed on 9/8/19.    Capri Freeman, [facility] Connection Triage/Med Refill 10/16/2019     Requested Prescriptions   Pending Prescriptions Disp Refills     amLODIPine (NORVASC) 10 MG tablet 90 tablet 3     Sig: Take 1 tablet (10 mg total) by mouth daily.       Calcium-Channel Blockers Protocol Passed - 10/16/2019  7:52 AM        Passed - PCP or prescribing provider visit in past 12 months or next 3 months     Last office visit with prescriber/PCP: Visit date not found OR same dept: 10/9/2019 Rivka Hubbard CNP OR same specialty: 10/9/2019 Rivka Hubbard CNP  Last physical: 9/26/2019 Last MTM visit: Visit date not found   Next visit within 3 mo: Visit date not found  Next physical within 3 mo: Visit date not found  Prescriber OR PCP: Aravind Villa MD  Last diagnosis associated with med order: 1. Benign Essential Hypertension  - amLODIPine (NORVASC) 10 MG tablet; Take 1 tablet (10 mg total) by mouth daily.  Dispense: 90 tablet; Refill: 3    If protocol passes may refill for 12 months if within 3 months of last provider visit (or a total of 15 months).             Passed - Blood pressure filed in past 12 months     BP Readings from Last 1 Encounters:   10/09/19 120/80

## 2021-06-02 NOTE — TELEPHONE ENCOUNTER
----- Message from Rivka Hubbard CNP sent at 10/9/2019  8:22 PM CDT -----  Your stool sample came back positive for C difficile, a bacterial infection of the colon, which is usually secondary to antibiotic use. First line treatment is an antibiotic called Vancomycin. I have sent this to your pharmacy. You will take a 125 mg tablet four times daily for 10 days. Please let us know if your symptoms persist or worsen in any way. I will reach out with the results of stool culture when this returns, however, the C dif would explain your current symptoms. Your blood count and metabolic panel are normal. Don't hesitate to reach out with any questions or concerns.

## 2021-06-02 NOTE — TELEPHONE ENCOUNTER
I will place order.  Please make sure it is been at least 1 week since patient has finished course of antibiotics for treatment of C. difficile.

## 2021-06-02 NOTE — TELEPHONE ENCOUNTER
Refill Approved    Rx renewed per Medication Renewal Policy. Medication was last renewed on 9/24/18.    Capri Freeman, [facility] Connection Triage/Med Refill 10/16/2019     Requested Prescriptions   Pending Prescriptions Disp Refills     atorvastatin (LIPITOR) 20 MG tablet 90 tablet 3     Sig: Take 1 tablet (20 mg total) by mouth daily.       Statins Refill Protocol (Hmg CoA Reductase Inhibitors) Passed - 10/16/2019  7:45 AM        Passed - PCP or prescribing provider visit in past 12 months      Last office visit with prescriber/PCP: Visit date not found OR same dept: 10/9/2019 Rivka Hubbard CNP OR same specialty: 10/9/2019 Rivka Hubbard CNP  Last physical: 9/26/2019 Last MTM visit: Visit date not found   Next visit within 3 mo: Visit date not found  Next physical within 3 mo: Visit date not found  Prescriber OR PCP: Aravind Villa MD  Last diagnosis associated with med order: 1. Hyperlipidemia  - atorvastatin (LIPITOR) 20 MG tablet; Take 1 tablet (20 mg total) by mouth daily.  Dispense: 90 tablet; Refill: 3    If protocol passes may refill for 12 months if within 3 months of last provider visit (or a total of 15 months).

## 2021-06-02 NOTE — PROGRESS NOTES
Assessment/Plan:    1. Diarrhea, unspecified type  Presented diarrhea despite 3 rounds of antibiotics for treatment of diverticulitis.  Reviewed recent CT results which indicated mild diverticulitis but no other significant findings.  Abdominal exam is overall unremarkable today despite some mild discomfort of left lower quadrant on palpation.  He is nontoxic appearing and afebrile.  Given recurrence of symptoms along with abdominal cramping, will obtain a stool culture and test for C. difficile.  We will also obtain the following labs as noted below.  Patient is requesting referral to gastroenterology in the event that symptoms do not get better.  Referral placed today.  We discussed red flag symptoms that would warrant immediate follow-up evaluation.  - Basic Metabolic Panel  - Culture, Stool; Future  - C. difficile Toxigenic by PCR; Future  - HM1(CBC and Differential)  - HM1 (CBC with Diff)  - Ambulatory referral to Gastroenterology    2. Purpura (H)  Differentials include IgA vasculitis versus medication reaction.  Patient feels that peripheral is improving over the last week.  We will check a complete blood count with differential as well as basic metabolic panel today.  Advised patient to closely monitor for any new or worsening symptoms.  - Basic Metabolic Panel  - HM1(CBC and Differential)  - HM1 (CBC with Diff)    Subjective:    Jay Roth is a 64-year-old male seen today for follow-up evaluation of diarrhea.  Patient has been evaluated a few times over the last 2 months with concerns of persistent diarrhea.  He has been on antibiotics off and on since beginning of September 2019.  Symptoms are most consistent with diverticulitis which patient has had in the past.  He was initially treated with Augmentin.  After having another bout of symptoms on medication, he was given a prescription for ciprofloxacin and Flagyl.  This regimen was then extended an additional week 2 weeks ago during his annual exam  when he continued to have diarrhea.  Today, patient states that he finished the last round of antibiotics 1 week ago.  He continues to have bouts of diarrhea, occurring last week and then starting again last night.  He has gone more than 10 times over the last 24 hours.  He denies having any fevers, chills, myalgias.  No nausea or vomiting.  He has discomfort about 4 to 5 inches to the left of his bellybutton.  No blood or mucus noted in the stool.  He is concerned about a rash that developed on his arms, legs and torso last week on the final day of his antibiotic.  He notices red circular lesions and bumps, worse on his lower extremities.  He felt that this was likely a reaction to the medication.  Rash is not itchy.  He denies having lesions on his face.  Feels that it is improving over the last couple of days.  Review of systems is as stated in HPI, and the remainder of the 10 system review is otherwise unremarkable.    Past Medical History, Family History, and Social History reviewed.    Past Surgical History:   Procedure Laterality Date     UT KNEE SCOPE,MED OR LAT MENIS REPAIR      Description: Knee Arthroscopy With Medial Meniscus Repair;  Recorded: 2009;        No family history on file.     No past medical history on file.     Social History     Tobacco Use     Smoking status: Former Smoker     Types: Cigarettes     Last attempt to quit: 1990     Years since quittin.7     Smokeless tobacco: Current User     Types: Chew   Substance Use Topics     Alcohol use: Yes     Drug use: No        Current Outpatient Medications   Medication Sig Dispense Refill     amLODIPine (NORVASC) 10 MG tablet Take 1 tablet (10 mg total) by mouth daily. 90 tablet 3     atorvastatin (LIPITOR) 20 MG tablet Take 1 tablet (20 mg total) by mouth daily. 90 tablet 3     betamethasone dipropionate (DIPROLENE) 0.05 % ointment APPLY SPARINGLY EXTERNALLY TO THE AFFECTED AREA TWICE DAILY 30 g 0     celecoxib (CELEBREX) 200 MG  "capsule TK 1 C PO BID  2     oxyCODONE-acetaminophen (PERCOCET) 5-325 mg per tablet TK 1 TO 2 TS PO Q FOUR H PRN Pain 30 tablet 0     traZODone (DESYREL) 100 MG tablet Take 1 tablet (100 mg total) by mouth bedtime. 90 tablet 0     varenicline (CHANTIX CHERI) 0.5 mg (11)- 1 mg (42) tablet Take  0.5mg once daily for 3 days, then 0.5mg twice daily for 3 days, then 1mg twice daily. 60 tablet 2     No current facility-administered medications for this visit.           Objective:    Vitals:    10/09/19 1457   BP: 120/80   Patient Site: Left Arm   Patient Position: Sitting   Cuff Size: Adult Regular   Pulse: 98   Temp: 98.5  F (36.9  C)   Weight: (!) 233 lb 6.4 oz (105.9 kg)   Height: 5' 10\" (1.778 m)      Body mass index is 33.49 kg/m .      General Appearance:  Alert, afebrile, nontoxic appearing, cooperative, no distress, appears stated age.   HEENT:  Normal.  No acute findings.   Neck: Supple, symmetrical, no adenopathy.   Lungs:   Clear to auscultation bilaterally, respirations unlabored.  No expiratory wheeze or inspiratory crackles noted.   Heart:  Regular rate and rhythm, S1, S2 normal, no murmur, rub or gallop   Abdomen:   Soft, non-tender, positive bowel sounds, no masses, no organomegaly   Extremities: Extremities normal.  No cyanosis or edema   Skin:  Purpura noted across lower extremities, upper extremities and torso.  Lesions are not blanchable.  Nonpruritic and nontender.  Skin is otherwise warm, dry.  Skin color, texture, turgor normal.       This note has been dictated using voice recognition software. Any grammatical or context distortions are unintentional and inherent to the use of this software.     "

## 2021-06-02 NOTE — TELEPHONE ENCOUNTER
Reason contacted:  Orders request  Information relayed:  Below message. Lab appointment scheduled for patient.   Additional questions:  No  Further follow-up needed:  No  Okay to leave a detailed message:  No

## 2021-06-03 VITALS
WEIGHT: 230 LBS | WEIGHT: 230 LBS | HEIGHT: 70 IN | BODY MASS INDEX: 33 KG/M2 | BODY MASS INDEX: 33 KG/M2 | BODY MASS INDEX: 33.24 KG/M2 | BODY MASS INDEX: 33.24 KG/M2 | HEIGHT: 70 IN

## 2021-06-03 VITALS
SYSTOLIC BLOOD PRESSURE: 118 MMHG | TEMPERATURE: 99.2 F | HEART RATE: 92 BPM | DIASTOLIC BLOOD PRESSURE: 80 MMHG | RESPIRATION RATE: 16 BRPM | BODY MASS INDEX: 33.82 KG/M2 | WEIGHT: 236.25 LBS | HEIGHT: 70 IN

## 2021-06-03 VITALS
HEIGHT: 70 IN | HEIGHT: 70 IN | HEIGHT: 70 IN | BODY MASS INDEX: 29.41 KG/M2 | WEIGHT: 205.4 LBS | WEIGHT: 205.4 LBS | BODY MASS INDEX: 29.41 KG/M2 | BODY MASS INDEX: 29.41 KG/M2 | WEIGHT: 205.4 LBS

## 2021-06-03 VITALS — HEIGHT: 70 IN | WEIGHT: 225 LBS | BODY MASS INDEX: 32.21 KG/M2

## 2021-06-03 VITALS
HEIGHT: 69 IN | OXYGEN SATURATION: 97 % | HEART RATE: 71 BPM | SYSTOLIC BLOOD PRESSURE: 116 MMHG | BODY MASS INDEX: 34.66 KG/M2 | DIASTOLIC BLOOD PRESSURE: 74 MMHG | WEIGHT: 234 LBS

## 2021-06-03 VITALS
WEIGHT: 233 LBS | HEART RATE: 85 BPM | DIASTOLIC BLOOD PRESSURE: 82 MMHG | SYSTOLIC BLOOD PRESSURE: 132 MMHG | RESPIRATION RATE: 16 BRPM | HEIGHT: 70 IN | OXYGEN SATURATION: 96 % | BODY MASS INDEX: 33.36 KG/M2

## 2021-06-03 VITALS
SYSTOLIC BLOOD PRESSURE: 120 MMHG | BODY MASS INDEX: 33.41 KG/M2 | WEIGHT: 233.4 LBS | DIASTOLIC BLOOD PRESSURE: 80 MMHG | TEMPERATURE: 98.5 F | HEART RATE: 98 BPM | HEIGHT: 70 IN

## 2021-06-03 VITALS
DIASTOLIC BLOOD PRESSURE: 78 MMHG | HEART RATE: 68 BPM | SYSTOLIC BLOOD PRESSURE: 130 MMHG | WEIGHT: 231 LBS | HEIGHT: 70 IN | OXYGEN SATURATION: 96 % | BODY MASS INDEX: 33.07 KG/M2

## 2021-06-03 NOTE — PROGRESS NOTES
Preoperative Exam    Scheduled Procedure:  laparoscopic (possible open) sigmoidectomy  Surgery Date:  12/5/19  Surgery Location: [facility], fax [phone]    Surgeon:  Dr. Lyles    Assessment/Plan:     1. Pre-operative cardiovascular examination  Preoperative cardiovascular examination completed.  Scheduled laparoscopic versus open sigmoidectomy due to complications of sigmoid diverticulitis.  No contraindication to scheduled surgery identified.  - Electrocardiogram Perform and Read    2. Sigmoid diverticulitis  Recurrent sigmoid diverticulitis.  Scheduled sigmoidectomy as noted.  CBC pending.  Mild residual left lower abdominal discomfort and mild tenderness without guarding or rebound.  No abdominal mass.  - HM2(CBC w/o Differential)    3. C. difficile colitis  C. difficile colitis recently noted October 9, 2019 treated with vancomycin with resolution of diarrhea etc.    4. Benign Essential Hypertension  Hypertension stable continues amlodipine 10 mg daily.  - Basic Metabolic Panel    5. Hyperlipidemia LDL goal <100  Continues atorvastatin 20 mg daily for lipid management.    6. Right inguinal hernia  Right inguinal hernia described with CT abnormality as noted below with noted fat-containing inguinal hernia with stranding.  Will discuss with Dr. Lyles regarding surgical options with schedule procedure versus referral to Dr. Romie Elliott for definitive hernia repair.    7. JIGNA  Does not tolerate CPAP.    8. Hematuria  Recent ER evaluation for asymptomatic gross hematuria.  CBC, comprehensive metabolic panel and lipase unremarkable.  CT findings as noted below.  We will follow-up with urology as outpatient in order to complete work-up.  May proceed with scheduled surgery.        Surgical Procedure Risk: Intermediate (reported cardiac risk generally 1-5%)  Have you had prior anesthesia?: Yes  Have you or any family members had a previous anesthesia reaction:  No  Do you or any family members have  "a history of a clotting or bleeding disorder?: No  Cardiac Risk Assessment: no increased risk for major cardiac complications    Pending review of diagnostic evaluation, APPROVAL GIVEN to proceed with proposed procedure, without further diagnostic evaluation.    Please Note:  Patient is taking medications for Chronic Pain.    Functional Status: Independent  Patient plans to recover at home with family.     Subjective:      Jay Roth is a 64 y.o. male who presents for a preoperative consultation.  Recurrent diverticulitis noted.  Sigmoid location.  Had been treated initially with Augmentin and followed by ciprofloxacin and metronidazole.  Recent use of vancomycin for complications of C. difficile colitis with resolution of diarrhea.  Some residual sensation of fullness in rectum as well as left lower abdominal discomfort.  Also has more of a sharper pain in right inguinal region consistent with noted inguinal hernia on CT scan as noted below.  Patient's managed for underlying hypertension with amlodipine 10 mg daily and hyperlipidemia with atorvastatin 20 mg daily.  JIGNA history.  Mild bilirubin elevation.  Smokeless tobacco use typically using a tin every 3 days.  No chest pain.  No palpitations.  No fever.  JIGNA.  Does not tolerate CPAP.  Reviewed recent ER evaluation 2019 regarding gross hematuria.  Asymptomatic currently.  Improvement noted.  Work-up in ER otherwise appeared unremarkable with recommendation for outpatient follow-up with urology to complete further evaluation.    All other systems reviewed and are negative, other than those listed in the HPI.     - Lovely  3 children - 2 sons, 1 daughter  4 grandchildren (expecting 5th...)  Quit smoking ~ 20 years  Chew 1 tin per 3 days  EtOH: occ  Mom -  84 kidney failure  Dad -  86 \"wore out\"  2 bros -   1 sis -   Surgeries: knee arthroscopy bilateral; left inguinal hernia   Radiofrequency ablation procedure x 3 for lower " back pain  Hospitalizations: none  Work: retired as beer distributor (L1-L4 degenerative back issues) - oxycodone if walk, etc.  Hobbies: swim 5x/week, fishing;    Pertinent History  Do you have difficulty breathing or chest pain after walking up a flight of stairs: No  History of obstructive sleep apnea: No  Steroid use in the last 6 months: No  Frequent Aspirin/NSAID use: No  Prior Blood Transfusion: No  Prior Blood Transfusion Reaction: No  If for some reason prior to, during or after the procedure, if it is medically indicated, would you be willing to have a blood transfusion?:  There is no transfusion refusal.    Current Outpatient Medications   Medication Sig Dispense Refill     amLODIPine (NORVASC) 10 MG tablet Take 1 tablet (10 mg total) by mouth daily. 90 tablet 3     atorvastatin (LIPITOR) 20 MG tablet Take 1 tablet (20 mg total) by mouth daily. 90 tablet 3     betamethasone dipropionate (DIPROLENE) 0.05 % ointment APPLY SPARINGLY EXTERNALLY TO THE AFFECTED AREA TWICE DAILY 30 g 0     celecoxib (CELEBREX) 200 MG capsule TK 1 C PO BID  2     oxyCODONE-acetaminophen (PERCOCET) 5-325 mg per tablet TK 1 TO 2 TS PO Q FOUR H PRN Pain 30 tablet 0     traZODone (DESYREL) 100 MG tablet Take 1 tablet (100 mg total) by mouth bedtime. 90 tablet 0     No current facility-administered medications for this visit.         No Known Allergies    Patient Active Problem List   Diagnosis     Smokeless tobacco use     Hyperlipidemia LDL goal <100     Benign Prostatic Hypertrophy     Cerumen Impaction     Lower Back Pain     Diverticulosis     Hematuria     Obstructive Sleep Apnea     Benign Essential Hypertension     Insomnia     Colon polyp     History of Bell's palsy     Hyperbilirubinemia     Acute diverticulitis       No past medical history on file.    Past Surgical History:   Procedure Laterality Date     OK KNEE SCOPE,MED OR LAT MENIS REPAIR      Description: Knee Arthroscopy With Medial Meniscus Repair;  Recorded:  "2009;       Social History     Socioeconomic History     Marital status:      Spouse name: Not on file     Number of children: Not on file     Years of education: Not on file     Highest education level: Not on file   Occupational History     Not on file   Social Needs     Financial resource strain: Not on file     Food insecurity:     Worry: Not on file     Inability: Not on file     Transportation needs:     Medical: Not on file     Non-medical: Not on file   Tobacco Use     Smoking status: Former Smoker     Types: Cigarettes     Last attempt to quit: 1990     Years since quittin.9     Smokeless tobacco: Current User     Types: Chew   Substance and Sexual Activity     Alcohol use: Yes     Drug use: No     Sexual activity: Yes     Partners: Female   Lifestyle     Physical activity:     Days per week: Not on file     Minutes per session: Not on file     Stress: Not on file   Relationships     Social connections:     Talks on phone: Not on file     Gets together: Not on file     Attends Buddhist service: Not on file     Active member of club or organization: Not on file     Attends meetings of clubs or organizations: Not on file     Relationship status: Not on file     Intimate partner violence:     Fear of current or ex partner: Not on file     Emotionally abused: Not on file     Physically abused: Not on file     Forced sexual activity: Not on file   Other Topics Concern     Not on file   Social History Narrative     Not on file       Patient Care Team:  Aravind Villa MD as PCP - General (Family Medicine)  Valerio De La Cruz MD as Assigned PCP  Aravind Villa MD as Assigned PCP          Objective:     Vitals:    19 0901   BP: 130/78   Pulse: 68   SpO2: 96%   Weight: (!) 231 lb (104.8 kg)   Height: 5' 9.75\" (1.772 m)         Physical Exam:  Physical Exam     General Appearance:    Alert, cooperative, no distress, appears stated age.  Obesity.   Head:    Normocephalic, without " obvious abnormality, atraumatic   Eyes:    PERRL, conjunctiva/corneas clear, EOM's intact, fundi     benign, both eyes        Ears:    Normal TM's and external ear canals, both ears   Nose:   Nares normal, septum midline, mucosa normal, no drainage    or sinus tenderness   Throat:   Lips, mucosa, and tongue normal; teeth and gums normal   Neck:   Supple, symmetrical, trachea midline, no adenopathy;        thyroid:  No enlargement/tenderness/nodules; no carotid    bruit or JVD   Back:     Symmetric, no curvature, ROM normal, no CVA tenderness   Lungs:     Clear to auscultation bilaterally, respirations unlabored   Chest wall:    No tenderness or deformity   Heart:    Regular rate and rhythm, S1 and S2 normal, no murmur, rub   or gallop   Abdomen:     Soft, non-tender, bowel sounds active all four quadrants,     no masses, no organomegaly.     Genitalia:    Normal male without lesion, discharge or tenderness.  No significant inguinal hernia noted, however, right inguinal canal tenderness on exam.     Rectal:    deferred   Extremities:   Extremities normal, atraumatic, no cyanosis or edema   Pulses:   2+ and symmetric all extremities   Skin:   Skin color, texture, turgor normal, no rashes or lesions   Lymph nodes:   Cervical, supraclavicular, and axillary nodes normal   Neurologic:   CNII-XII intact. Normal strength, sensation and reflexes       throughout        There are no Patient Instructions on file for this visit.    EK19  NSR. Normal EKG.    Labs:    Lab Results   Component Value Date    WBC 5.4 2019    HGB 15.4 2019    HCT 45.3 2019    MCV 85 2019     2019      Results for orders placed or performed in visit on 19   Comprehensive Metabolic Panel   Result Value Ref Range    Sodium 139 136 - 145 mmol/L    Potassium 4.6 3.5 - 5.0 mmol/L    Chloride 102 98 - 107 mmol/L    CO2 26 22 - 31 mmol/L    Anion Gap, Calculation 11 5 - 18 mmol/L    Glucose 89 70 - 125  mg/dL    BUN 11 8 - 22 mg/dL    Creatinine 0.88 0.70 - 1.30 mg/dL    GFR MDRD Af Amer >60 >60 mL/min/1.73m2    GFR MDRD Non Af Amer >60 >60 mL/min/1.73m2    Bilirubin, Total 0.9 0.0 - 1.0 mg/dL    Calcium 9.7 8.5 - 10.5 mg/dL    Protein, Total 6.9 6.0 - 8.0 g/dL    Albumin 3.8 3.5 - 5.0 g/dL    Alkaline Phosphatase 68 45 - 120 U/L    AST 31 0 - 40 U/L    ALT 32 0 - 45 U/L      Lab Results   Component Value Date    LIPASE 21 11/30/2019      Lab Results   Component Value Date    COLORU Nilam (!) 11/30/2019    CLARITYU Cloudy (!) 11/30/2019    GLUCOSEU Negative 11/30/2019    BILIRUBINUR Negative 11/30/2019    KETONESU Negative 11/30/2019    SPECGRAV 1.015 11/30/2019    HGBUA Large (!) 11/30/2019    PHUR 6.0 11/30/2019    PROTEINUA >=500 mg/dL (!) 11/30/2019    UROBILINOGEN <2.0 E.U./dL 11/30/2019    NITRITE Negative 11/30/2019    LEUKOCYTESUR Negative 11/30/2019    BACTERIA None Seen 11/30/2019    RBCUA >100 (!) 11/30/2019    WBCUA 0-5 11/30/2019    SQUAMEPI 0-5 11/30/2019        Recent Results (from the past 24 hour(s))   Electrocardiogram Perform and Read    Collection Time: 11/21/19  9:09 AM   Result Value Ref Range    SYSTOLIC BLOOD PRESSURE      DIASTOLIC BLOOD PRESSURE      VENTRICULAR RATE 70 BPM    ATRIAL RATE 70 BPM    P-R INTERVAL 174 ms    QRS DURATION 104 ms    Q-T INTERVAL 390 ms    QTC CALCULATION (BEZET) 421 ms    P Axis 48 degrees    R AXIS -4 degrees    T AXIS 25 degrees    MUSE DIAGNOSIS       Normal sinus rhythm  Normal ECG  When compared with ECG of 28-MAR-2019 15:11,  Premature atrial complexes are no longer Present         Immunization History   Administered Date(s) Administered     Hep A, historic 04/22/2010, 04/28/2011     Influenza,recombinant,Inj,Pf ([id number] 11/01/2019     Td,adult,historic,unspecified 07/02/2006     Tdap 04/28/2011     ZOSTER, LIVE 05/04/2016     ZOSTER, RECOMBINANT, IM 09/24/2018, 11/27/2018           EXAM: CT ABDOMEN PELVIS WO ORAL W IV CONTRAST  LOCATION: [facility]  Hospital  DATE/TIME: 11/30/2019 8:38 PM     INDICATION: New onset caty hematuria, bilateral lower abdominal pain, history of right inguinal hernia and diverticulitis.  COMPARISON: 09/26/2019.  TECHNIQUE: CT scan of the abdomen and pelvis was performed following injection of IV contrast. Multiplanar reformats were obtained. Dose reduction techniques were used.  CONTRAST: Iohexol (Omni) 100 mL.     FINDINGS:   LOWER CHEST: Coronary calcifications.     HEPATOBILIARY: No focal liver lesion. Borderline intrahepatic biliary dilatation. No extrahepatic biliary dilatation. No opaque gallstones.     PANCREAS: Normal.     SPLEEN: Normal.     ADRENAL GLANDS: Normal.     KIDNEYS/BLADDER: Renal cysts. No hydronephrosis.     BOWEL: Questionable very subtle pericolonic stranding near the mid to proximal sigmoid colon (series 3, images 176-186). Colonic diverticulosis. No other significant bowel abnormality.     LYMPH NODES: No adenopathy.     VASCULATURE: Aortic atherosclerosis without aneurysm.     PELVIC ORGANS: Normal.     OTHER: Small fat-containing right inguinal hernia.     MUSCULOSKELETAL: Normal.     IMPRESSION:      1.  Questionable very subtle stranding near the proximal to mid sigmoid colon; very early or subtle acute diverticulitis not excluded.     2.  No free air or abscess.     3.  No opaque urinary tract stones. Mild bladder wall thickening could be from incomplete distention.     4.  Fat-containing right inguinal hernia.          EXAM: CT ABDOMEN PELVIS W ORAL W IV CONTRAST  LOCATION: [facility]  DATE/TIME: 9/26/2019 5:17 PM     INDICATION: LLQ abdominal pain, diverticulitis suspected  COMPARISON: None.  TECHNIQUE: Helical enhanced thin-section CT scan of the abdomen and pelvis was performed following injection of IV contrast. Multiplanar reformats were obtained. Dose reduction techniques were used.  CONTRAST: Iohexol (Omni) 100 mL     FINDINGS:   LUNG BASES: Negative.     ABDOMEN: The liver, spleen,  pancreas, adrenal glands, gallbladder, and left kidney are unremarkable. 4 cm cyst upper pole right kidney. No adenopathy. Atherosclerotic disease of the aorta and iliac arteries.     PELVIS: Scattered diverticula descending and sigmoid colon. Subtle wall thickening and stranding proximal sigmoid colon. No evidence for perforation, abscess, or bowel obstruction. No adenopathy or ascites. Fat-containing right inguinal hernia with   stranding.     MUSCULOSKELETAL: Negative.     IMPRESSION:   CONCLUSION:   1.  Mild changes of acute sigmoid diverticulitis. No evidence for complications.  2.  Fat-containing right inguinal hernia with mild stranding/possible inflammation. Clinical correlation recommended.        Electronically signed by Aravind Villa MD 11/21/19 9:03 AM

## 2021-06-03 NOTE — PROGRESS NOTES
This is a 64 y.o. Male  who I'm asked to see by Aravind Villa MD  for evaluation of a right inguinal hernia.  The patient has never noticed a bulge.  In fact he never knew he had one there was told he had one based on the CT scan done for diverticulitis.  Over the past week now he is suddenly having significant pain in his groin.  It is bothering him to the point where he is put off some planned colorectal surgery because he feels like this is a bigger problem.    See chart review for PMH, Med list, allergies, FH and SH.  Patient Active Problem List   Diagnosis     Smokeless tobacco use     Hyperlipidemia LDL goal <100     Benign Prostatic Hypertrophy     Cerumen Impaction     Lower Back Pain     Diverticulosis     Hematuria     Obstructive Sleep Apnea     Benign Essential Hypertension     Insomnia     Colon polyp     History of Bell's palsy     Hyperbilirubinemia     Acute diverticulitis       Current Outpatient Medications:      AFLURIA QD 2019-20,3YR UP,,PF, 60 mcg (15 mcg x 4)/0.5 mL Syrg, ADM 0.5ML IM UTD, Disp: , Rfl: 0     amLODIPine (NORVASC) 10 MG tablet, Take 1 tablet (10 mg total) by mouth daily., Disp: 90 tablet, Rfl: 3     atorvastatin (LIPITOR) 20 MG tablet, Take 1 tablet (20 mg total) by mouth daily., Disp: 90 tablet, Rfl: 3     betamethasone dipropionate (DIPROLENE) 0.05 % ointment, APPLY SPARINGLY EXTERNALLY TO THE AFFECTED AREA TWICE DAILY, Disp: 30 g, Rfl: 0     bisacodyl (DULCOLAX) 5 mg EC tablet, TK 2 TS PO AT 10 AM AND AT 2 PM AND DAY B SURGERY, Disp: , Rfl: 0     celecoxib (CELEBREX) 200 MG capsule, TK 1 C PO BID, Disp: , Rfl: 2     coenzyme Q10 100 mg capsule, Take by mouth., Disp: , Rfl:      dicyclomine (BENTYL) 10 MG capsule, Take 1 capsule by mouth., Disp: , Rfl:      erythromycin with ethanol (EMGEL) 2 % gel, , Disp: , Rfl: 0     methylcellulose (CITRUCEL) 500 mg Tab, Take by mouth., Disp: , Rfl:      multivitamin (ONE A DAY) per tablet, Take 1 tablet by mouth., Disp: , Rfl:       "omega-3 fatty acids-fish oil (FISH OIL) 360-1,200 mg cap, Take by mouth., Disp: , Rfl:      oxyCODONE-acetaminophen (PERCOCET) 5-325 mg per tablet, TK 1 TO 2 TS PO Q FOUR H PRN Pain, Disp: 30 tablet, Rfl: 0     polyethylene glycol (GLYCOLAX) 17 gram/dose powder, MIX WITH 64OZ OF GATORADE AND DRINK ONE 8OZ GLASS Q 15 MINUTED UNTIL GONE ONE DAY PRIOR TO SURGERY, Disp: , Rfl: 0     traZODone (DESYREL) 100 MG tablet, Take 1 tablet (100 mg total) by mouth bedtime., Disp: 90 tablet, Rfl: 0  No Known Allergies    Social History     Tobacco Use     Smoking status: Former Smoker     Types: Cigarettes     Last attempt to quit: 1990     Years since quittin.9     Smokeless tobacco: Current User     Types: Chew   Substance Use Topics     Alcohol use: Yes     Drug use: No       Review of systems is negative for full 12 point review of systems.    Physical exam:  /82   Pulse 85   Resp 16   Ht 5' 9.75\" (1.772 m)   Wt (!) 233 lb (105.7 kg)   SpO2 96%   BMI 33.67 kg/m    General:alert, appears stated age, cooperative and mildly obese  Lungs: chest clear, no wheezing, rales, normal symmetric air entry  CV: regular rate and rhythm, S1, S2 normal, no murmur, click, rub or gallop  Abdomen: He is tender to palpation in the right inguinal canal but I cannot even feel the hernia.  There is no visible bulge.    Reviewed his CT scan.  He has fat in the inguinal canal consistent with a fat containing hernia.      Impression: Small right inguinal hernia.  This is really odd and that he did not even know he had the hernia was told he had one and now he suddenly I am even wondering if this could just be epididymitis is usually small fat-containing hernia is just simply do not cause this much discomfort.  However, do not feel comfortable putting him on a course of antibiotics since he is just been getting over C. difficile colitis.  I warned him that during this repair may not help his discomfort if this is not truly a hernia " causing the pain.  He is bothered enough however that he wants to give it a try.    Plan: Right inguinal hernia repair.  This is typically an outpatient procedure done under local anesthetic with IV sedation.  He asked appropriate questions and we will proceed in the near future.

## 2021-06-03 NOTE — TELEPHONE ENCOUNTER
Referral Request    Type of referral: General Surgeon    Who s requesting: Patient     Why the request: Right inguinal hernia    Have you been seen for this request: Yes     Does patient have a preference on a group/provider?   In Network.    Okay to leave a detailed message?  Yes   Patient saw surgeon today and  MD recommended a consult for Hernia repair.    Please place referral and advise patient of the outcome.

## 2021-06-04 VITALS
SYSTOLIC BLOOD PRESSURE: 140 MMHG | BODY MASS INDEX: 31.57 KG/M2 | WEIGHT: 220 LBS | HEART RATE: 114 BPM | DIASTOLIC BLOOD PRESSURE: 80 MMHG | OXYGEN SATURATION: 97 %

## 2021-06-04 VITALS
HEIGHT: 70 IN | BODY MASS INDEX: 37.11 KG/M2 | DIASTOLIC BLOOD PRESSURE: 82 MMHG | SYSTOLIC BLOOD PRESSURE: 138 MMHG | HEART RATE: 112 BPM | RESPIRATION RATE: 20 BRPM | WEIGHT: 259.2 LBS

## 2021-06-04 VITALS
WEIGHT: 248.5 LBS | HEART RATE: 66 BPM | HEIGHT: 70 IN | BODY MASS INDEX: 35.58 KG/M2 | DIASTOLIC BLOOD PRESSURE: 62 MMHG | SYSTOLIC BLOOD PRESSURE: 118 MMHG

## 2021-06-04 NOTE — ANESTHESIA CARE TRANSFER NOTE
Last vitals:   Vitals:    12/05/19 1720   BP: (!) 142/93   Pulse: 94   Resp: 16   Temp: 36.6  C (97.8  F)   SpO2: 98%     Patient's level of consciousness is drowsy  Spontaneous respirations: yes  Maintains airway independently: yes  Dentition unchanged: yes  Oropharynx: oropharynx clear of all foreign objects    QCDR Measures:  ASA# 20 - Surgical Safety Checklist: WHO surgical safety checklist completed prior to induction    PQRS# 430 - Adult PONV Prevention: 4558F - Pt received => 2 anti-emetic agents (different classes) preop & intraop  ASA# 8 - Peds PONV Prevention: NA - Not pediatric patient, not GA or 2 or more risk factors NOT present  PQRS# 424 - Annette-op Temp Management: 4559F - At least one body temp DOCUMENTED => 35.5C or 95.9F within required timeframe  PQRS# 426 - PACU Transfer Protocol: - Transfer of care checklist used  ASA# 14 - Acute Post-op Pain: ASA14B - Patient did NOT experience pain >= 7 out of 10

## 2021-06-04 NOTE — ANESTHESIA PREPROCEDURE EVALUATION
Anesthesia Evaluation      Patient summary reviewed   No history of anesthetic complications     Airway   Mallampati: IV  Neck ROM: full   Pulmonary     breath sounds clear to auscultation  (+) sleep apnea on no CPAP, moderate, a smoker (quit 1999)                         Cardiovascular   Exercise tolerance: > or = 4 METS  (+) hypertension, , hypercholesterolemia,     (-) dysrhythmias, angina  Rhythm: regular        Neuro/Psych      Comments: DDD    Endo/Other    (+) arthritis, obesity,      GI/Hepatic/Renal      Comments: Diverticulitis  Hx of C. Diff - treated     Other findings:     NPO > 8 hrs        Smokeless tobacco use    Hyperlipidemia LDL goal <100    Benign Prostatic Hypertrophy    Cerumen Impaction    Lower Back Pain    Diverticulosis    Hematuria    Obstructive Sleep Apnea    Benign Essential Hypertension    Insomnia    Colon polyp    History of Bell's palsy    Hyperbilirubinemia    Acute diverticulitis         Results for ALEXIA BILLS (MRN [id number]) as of 12/5/2019 11/30/2019 20:07  Sodium: 137  Potassium: 3.8  Chloride: 104  CO2: 26  Anion Gap, Calculation: 7  BUN: 14  Creatinine: 1.08  GFR MDRD Af Amer: >60  GFR MDRD Non Af Amer: >60  Calcium: 8.5  AST: 22  ALT: 22  ALBUMIN: 3.0 (L)  Protein, Total: 6.1  Alkaline Phosphatase: 68  Bilirubin, Total: 0.6  Glucose: 90  Lipase: 21  INR: 1.03  WBC: 5.4  RBC: 5.32  Hemoglobin: 15.4  Hematocrit: 45.3  MCV: 85  MCH: 28.9  MCHC: 34.0  RDW: 12.8  Platelets: 189        Dental    (+) poor dentition                       Anesthesia Plan  Planned anesthetic: general endotracheal      Glidescope in room  Esmolol    ASA 3   Induction: intravenous   Anesthetic plan and risks discussed with: patient  Anesthesia plan special considerations: antiemetics,   Post-op plan: routine recovery

## 2021-06-04 NOTE — TELEPHONE ENCOUNTER
Reason contacted:  Appointment   Information relayed:  Spoke with patient and he states he has a procedure for LAPAROSCOPIC POSSIBLE OPEN SIGMOIDECTOMY scheduled tomorrow and he needs the ok from Dr. Villa to proceed.     Patient notified he will need a pre-op physical and was scheduled at 1:50 pm today with Rivka Hubbard CNP.   Additional questions:  No  Further follow-up needed:  No  Okay to leave a detailed message:  No

## 2021-06-04 NOTE — TELEPHONE ENCOUNTER
Who is calling:  Patient   Reason for Call:  Patient states he went ER on 11/30/19 for Hematuria now it is resolved but Hospital Doctor recommended to see Urologist before Laparoscopic which is tomorrow patient unable to find appointment with Urology . Patient requesting providers suggestion . Patient requested a call from provider to discuss directly .  Date of last appointment with primary care: 11/21/19  Okay to leave a detailed message: No

## 2021-06-05 NOTE — PROGRESS NOTES
"Discharge Protocol     Tell me how you are doing now that you are home?\"    Patient is happy to be home but doesn't feel 100% back to normal because can't eat solid foods and  currently on TPN.      Discharge Instructions    \"Do you understand your discharge instructions?\"    Pt response: Yes      \"Do you have an appointment to follow up with your primary care provider?\"  No      \"Yes: Appointment date/time:\"      \"No: Assist pt in scheduling an 'INF' visit w/PCP, refer to AVS for specified follow up time frame.\"  Patient would like to call and schedule when ready.      Medications    \"Did you have any medication changes?\"  Yes   Do you have any concerns with obtaining the medications or questions about your medications, that you would like a RN to review with you?     No    \"Please bring your medications with you to your follow up appointment.\"      Call Summary    \"What questions or concerns do you have about your recent visit and your follow-up care?\"      No questions or concerns              \"If you have questions or things don't continue to improve, we encourage you contact us through the main clinic number( [phone]) .Even if the clinic is not open, triage nurses are available 24/7 to help you.\"         I am with Clinic Care Coordination, a team of nurses, social workers, community health workers, and financial resource workers, who work together with your primary doctor.  We are here to see if we can help you with a variety of things - from resources in the community such as food assistance, transportation, help in the home, equipment needs, assistance with medications, coordination of your appointments, help with any medical questions, and things like this.      We would have a nurse or  speak with you and together come up with goals to help you to improve your health and wellness and do the best to help you stay out of the hospital.        The clinic Community Health Worker  discussed with " the  patient about possible Clinic Care Coordination enrollment.  The service was described to the patient.  The patient declined enrollement at this time.

## 2021-06-05 NOTE — PROGRESS NOTES
Hospital Discharge Follow Up Call: Attempt 1 Community Health Worker called and left a message for the patient. If the patient is returning my call, please transfer the patient to [facility] at ext. [phone].

## 2021-06-06 NOTE — PROGRESS NOTES
"Jay Roth is a 64 y.o. male who is being evaluated via a billable telephone visit.      The patient has been notified of following:     \"This telephone visit will be conducted via a call between you and your physician/provider. We have found that certain health care needs can be provided without the need for a physical exam.  This service lets us provide the care you need with a short phone conversation.  If a prescription is necessary we can send it directly to your pharmacy.  If lab work is needed we can place an order for that and you can then stop by our lab to have the test done at a later time.    If during the course of the call the physician/provider feels a telephone visit is not appropriate, you will not be charged for this service.\"     Consent has been obtained for this service by 1 care team member: Yes. See the scanned image in the medical record.    Jay Roth complains of follow up colitis (pt finishing up with prednisone.  still having some discomfort left side of abdomen); Cough (dry - started couple weeks ago - on and off); and scratchy throat (lingering - started after surgery, dry heaving - pt denies any fevers)  .    I have reviewed and updated the patient's Past Medical History, Social History, Family History and Medication List.    ALLERGIES  Patient has no known allergies.    LH (MA signature)    Additional provider notes:   Virtual visit was completed today.  Follow-up of colitis with prior hospitalization with hospital discharge February 2, 2020 was reviewed.  Patient has had somewhat persistent dull scratchy throat with somewhat fullness.  No fevers.  Dry cough going on for a little bit primarily since hospital discharge with prior vomiting and subsequent diarrhea.  No blood or mucus.  Bowel movements have returned to normal with daily bowel movement formed typically each morning with prior colitis issues completing prednisone taper currently 5 mg daily which will be completed on " Monday, March 23, 2020 otherwise discussion regarding use of mesalamine which she had responded well to in the past and use 2.4 g/day dosing.  Denies postnasal drainage.  No significant reflux history however was prescribed omeprazole 20 mg daily at time of hospital discharge.      EXAM: MRI ENTEROGRAPHY/MRI ABDOMEN AND MRI PELVIS WITHOUT AND WITH CONTRAST  LOCATION: [facility]  DATE/TIME: 3/3/2020 10:26 AM     INDICATION: Noninfective gastroenteritis and colitis, unspecified. History of C. difficile colitis.  COMPARISON: 01/24/2020 CT. 01/09/2020 CT. 12/20/2019 CT.     TECHNIQUE: Routine enterography protocol including imaging of abdomen and pelvis with axial T1 in/out phase, axial T2, coronal T2. Post contrast abdomen and pelvis axial and coronal thin-section T1 with fat sat. 1 mg Glucagon. Oral VoLumen.  CONTRAST: Gadavist 10 mL Gadavist     FINDINGS:      ENTEROGRAPHY: Postop change with partial colonic resection left side and sigmoid colon.     Marked improvement in the previous colitis with minimal inflammatory change remaining in the left side of the colon mid to upper abdomen. No new areas of active inflammatory change.     No obstruction, abscesses, or fistula.     ADDITIONAL FINDINGS: No significant findings in the liver, spleen, kidneys, pancreas, and adrenal glands. No lymphadenopathy.     IMPRESSION:   1.  Marked improvement in the left-sided colitis since 01/24/2020 with near complete resolution.     2.  No significant new findings.     3.  No significant incidental findings on MRI abdomen and MRI pelvis.      Assessment/Plan:    ICD-10-CM    1. C. difficile colitis  A04.72    2. Benign Essential Hypertension  I10    3. Cough  R05         Follow Up Plan: Follow up in 3 month(s) for recheck of colitis, HTN, sore throat with possible GERD etiology.   Patient will discontinue prednisone 5 mg on Monday and determine if recurrent concerns for colitis otherwise consideration for mesalamine 2.5 g/day  per recommendation of gastroenterologist.  Patient has continued to increase dietary fiber and is doing well with this with regular bowel movements daily basis each morning.  Did instruct patient to isolate versus self quarantine regarding current dry nonproductive cough without fever or shortness of breath.  Did discuss potential for reflux etiology without evidence currently for postnasal drainage.  Trial of omeprazole 20 mg once daily x 14 days was reviewed.  Patient continues use of amlodipine 10 mg using half tablet daily for hypertension.  I have reviewed the note as documented above.  This accurately captures the substance of my conversation with the patient.    Total time of call between patient and provider was 15 minutes     Aravind Villa MD

## 2021-06-06 NOTE — PROGRESS NOTES
Hospital Follow-up Visit:    Assessment/Plan:     1. C. difficile colitis  Transitional care management completed.  Medication reconciliation as noted below.  Reviewed multiple admissions with subsequent hospital discharge February 2, 2020 as noted below.  Patient does have follow-up with gastroenterology on Thursday, February 13, 2020 for ongoing management.  Completing prednisone taper originally 35 mg daily x7 days now 30 mg daily x7 days with continued decrease until 20 mg daily dose and follow-up with GI completed.  - HM2(CBC w/o Differential)  - Comprehensive Metabolic Panel    2. Benign Essential Hypertension  Hypertension fair control.  Amlodipine 10 mg using half tablet daily.  Will reassess at follow-up in just over 4 weeks to ensure continued improvement as prednisone is weaned.  - Comprehensive Metabolic Panel    3. Drug-induced insomnia (H)  Insomnia associated prednisone.  Zolpidem 10 mg use half tablet to 1 tablet at bedtime as needed.  - zolpidem (AMBIEN) 10 mg tablet; Take 0.5-1 tablets (5-10 mg total) by mouth at bedtime as needed for sleep.  Dispense: 30 tablet; Refill: 2    4. Dermatitis  Betamethasone was refilled for patient.  - betamethasone dipropionate (DIPROLENE) 0.05 % ointment; APPLY SPARINGLY EXTERNALLY TO THE AFFECTED AREA TWICE DAILY AS NEEDED  Dispense: 45 g; Refill: 1    5. Hyperlipidemia  Hyperlipidemia present.  Has been on atorvastatin 20 mg daily in the past.  Will remain off medicine until this spring when repeat fasting cholesterol panel completed.    6. Hypomagnesemia  Hypomagnesemia.  Check magnesium level to ensure adequate replacement.  - Magnesium           Subjective:     Jay Roth is a 64 y.o. male who presents for a hospital discharge follow up.  Feeling better.  Nausea is resolved.  Is on prednisone currently at 30 mg daily dose as he continues weekly decrease and prednisone dose by 5 mg/week until 20 mg dose and follow-up with gastroenterology completed.  Eating  better.  Not sleeping well because of prednisone.  Some ankle swelling in the last few days while on both amlodipine and prednisone.  No orthopnea or PND.  No chest pain.  Denies recent illness.  Was treated for odynophagia secondary to esophageal candidiasis.  Has has had mild hyponatremia.  Did require TPN for malnutrition management.  Mirtazapine 30 mg at bedtime for benefits with depressed mood.  Using mesalamine 800 mg 3 times daily for colitis management as well.  Comprehensive review of systems as above otherwise all negative.      Reviewed hospital d/c summary from 2/2/20:  64 years old male with a past medical history of CHF, recurrent diverticular colitis, status post partial colectomy with end to end anastomosis who presented to the emergency room for evaluation of nausea and vomiting, he was admitted, please refer to H&P for details     Nausea and vomiting  - Etiology is unclear.  - CT abdomen shows persistent mild segmental colitis limited to descending colon above the level of anastomotic site.  - Patient is feeling better today, slight nausea but no vomiting.  - Patient unable to complete gastric emptying study.   - GI concerned he could have delayed gastric emptying vs duodenitis.   - TSH noted to be normal on recheck.   - Noted prior normal cortisol last month  - normal brain MRI 03/19.    - GI consult, appreciate input  - Start nystatin empirically for esophageal candidiasis  - Plan for repeating gastric emptying test as outpatient  - Continue Remeron as recommended by GI  - Continue to advance diet slowly as tolerated  - Discontinue Reglan  - Follow-up with GI as scheduled     Persistent colitis  - Status post colectomy  - Colorectal consult, appreciate input  - defer to CRS regarding possible NJ vs GJ tube for nutrition  - Started on steroid as recommended by GI  - Seems the patient is improving slowly  - Discussed with GI, start tapering steroid today  - Continue slow tapering prednisone course  "as directed by GI  - Continue mesalamine as recommended by colorectal surgery  - Follow-up with colorectal surgery as scheduled     Severe malnutrition  - Protein and calories   - Dietitian consult, appreciate input, continue supplement  - Continue TPN for 1 more week as recommended by surgery  - Hold off NJ tube as patient has better nutrition, defer to colorectal surgery  - Continue TPN on discharge for 10 more days as recommended by colorectal surgery     Odynophagia  - Probably secondary to esophageal candidiasis  - Started on nystatin swish and swallow on 2020  - Improving, continue nystatin on discharge as recommended by GI.     Abdominal pain  - Secondary to the above  - Improving  - Continue with Tylenol PRN  - Caution with narcotics.     Hypertension  - Stable blood pressure today  - Restart Norvasc  - Continue to monitor blood pressure     Hyperglycemia  - Secondary to TPN  - No history of diabetes  - Add insulin sliding scale coverage     Hyponatremia  - Mild, still present  - Correcting with TPN.  - Continue to monitor sodium level     Weakness and deconditioning  - Secondary to above   - Improving slowly.  - Patient is ambulatory well     CODE STATUS  - Patient is full code        Discussed with family , patient, GI, nursing staff and discharge planner        - Lovely  3 children - 2 sons, 1 daughter  4 grandchildren (expecting 5th...)  Quit smoking ~ 20 years  Chew 1 tin per 3 days  EtOH: occ  Mom -  84 kidney failure  Dad -  86 \"wore out\"  2 bros -   1 sis -   Surgeries: knee arthroscopy bilateral; left inguinal hernia   Radiofrequency ablation procedure x 3 for lower back pain  Hospitalizations: none  Work: retired as beer distributor (L1-L4 degenerative back issues) - oxycodone if walk, etc.  Hobbies: swim 5x/week, fishing;    Hospital/Nursing Home/IP Rehab Facility: [facility]  Date of Admission: 20  Date of Discharge:20  Reason(s) for Admission:nausea " and vomiting            Do you have any problems taking your medication regularly?  None       Have you had any changes in your medication since discharge? None       Have you had any difficulty following your discharge or treatment plan?  No    Summary of hospitalization:  Hospital discharge summary reviewed  Diagnostic Tests/Treatments reviewed.  Follow up needed: Follow-up with gastroenterology February 13, 2020.  Saw surgeon last week and will follow-up with their office in 1 month.  Other Healthcare Providers Involved in Patient's Care: Patient Care Team:  Aravind Villa MD as PCP - General (Family Medicine)  Aravind Villa MD as Assigned PCP      Update since discharge: {improved   Information from family, SNF, care coordination: wife (Lovely)     Post Discharge Medication Reconciliation: discharge medications reconciled, continue medications without change  Plan of care communicated with: patient and significant other    Objective:     Vitals:    02/11/20 1219   BP: 140/80   Pulse: (!) 114   SpO2: 97%   Weight: 220 lb (99.8 kg)         Physical Exam:    General Appearance:  Alert, cooperative, no distress, appears stated age   HEENT:  Normal.  No acute findings.   Neck: Supple, symmetrical, no adenopathy.   Lungs:   Clear to auscultation bilaterally, respirations unlabored.  No expiratory wheeze or inspiratory crackles noted.   Heart:  Regular rate and rhythm, S1, S2 normal, no murmur, rub or gallop   Abdomen:   Soft, non-tender, positive bowel sounds, no masses, no organomegaly   Extremities: Extremities normal.  No cyanosis.  Trace to 1+ bilateral ankle edema   Skin: Warm, dry.  Skin color, texture, turgor normal, or lesions   Neurologic: Nonfocal.            Coding guidelines for this visit:  Type of Medical   Decision Making Face-to-Face Visit       within 7 Days of discharge Face-to-Face Visit        within 14 days of discharge   Moderate Complexity [id number]   High Complexity [id number]        Electronically signed by Aravind Villa MD 02/11/20 12:17 PM

## 2021-06-09 NOTE — PROGRESS NOTES
"Jay is a 61 y.o. male presenting to the clinic for concerns for cold symptoms since last night.  Patient complains of sinus congestion and cough.  He denies headache, stomachache, nausea, vomiting, fever, postnasal drainage, sore throat.  He says his symptoms are worsening.  He has been taking over-the-counter DayQuil.  His wife was diagnosed with influenza B over the weekend.  He did not receive an influenza vaccine.    Review of Systems: A complete 14 point review of systems was obtained and is negative or as stated in the history of present illness.    Social History     Social History     Marital status:      Spouse name: N/A     Number of children: N/A     Years of education: N/A     Occupational History     Not on file.     Social History Main Topics     Smoking status: Former Smoker     Types: Cigarettes     Quit date: 1/1/1990     Smokeless tobacco: Current User     Types: Chew     Alcohol use Yes     Drug use: No     Sexual activity: Not on file     Other Topics Concern     Not on file     Social History Narrative       Active Ambulatory Problems     Diagnosis Date Noted     Nicotine Dependence      Hyperlipidemia      Benign Prostatic Hypertrophy      Cerumen Impaction      Lower Back Pain      Diverticulosis      Hematuria      Obstructive Sleep Apnea      Benign Essential Hypertension      Insomnia 04/20/2015     Resolved Ambulatory Problems     Diagnosis Date Noted     Insomnia, unspecified 04/20/2015     No Additional Past Medical History       No family history on file.    OBJECTIVE:     Visit Vitals     /72 (Patient Site: Right Arm, Patient Position: Sitting, Cuff Size: Adult Large)     Pulse 80     Temp 98.5  F (36.9  C)     Ht 5' 10\" (1.778 m)     Wt (!) 244 lb (110.7 kg)     SpO2 95%     BMI 35.01 kg/m2       Patient is alert, in no obvious distress.   Skin: Warm, dry.  No lesions or rashes.  Skin turgor rapid return.   HEENT:  Head normocephalic, atraumatic.  Eyes normal. Ears " normal.  Nose patent, mucosa red.  Oropharynx mucosa pink.  No lesions or tonsillar enlargement.   Neck: Supple, no lymphadenopathy.   Lungs:  Clear to auscultation. Respirations even and unlabored.  No wheezing or rales noted.   Heart:  Regular rate and rhythm.  No murmurs.     LABORATORY: Influenza A and B ordered and is negative.    ASSESSMENT AND PLAN:   1. Cough  Influenza A/B Rapid Test   2. Exposure to influenza  oseltamivir (TAMIFLU) 75 MG capsule     Discussed viral symptoms.  Due to exposure to influenza, will treat with Tamiflu.  His daughter is also pregnant.  Discussed contagiousness and good handwashing.  He will follow-up with his PCP with worsening symptoms.

## 2021-06-10 NOTE — PROGRESS NOTES
Assessment:      Healthy male exam.    Hypertension  Hyperlipidemia  Benign prostatic hypertrophy  Pedal edema most likely secondary to amlodipine effect.  Rule out venous stasis.     Plan:       The patient will have routine lab work done today.  We also will check a thyroid and a urinalysis to rule out other causes of pedal edema.  The patient will continue all present medications.  He will try to become more aerobically active.  He also will limit sodium in his diet to help with pedal edema.  He will stay on a weight loss type diet.  We discussed options of therapy such as smaller portion sizes and eating more fresh foods and less processed foods.  Colonoscopy is up-to-date.  Patient has a history of plantar fasciitis so he will limit high impact exercises and activities.     Subjective:      Jay Roth is a 61 y.o. male who presents for an annual exam. The patient reports that there is not domestic violence in his life.  Overall, he is feeling fairly well.  He is trying to lose weight by diet and exercise.  He has had some problem with a plantar fasciitis.  He does use orthotics.  The seem to be helping.  He is planning on retiring so he will be off of his feet more which should help quite a bit.  He has a history of hyperlipidemia and hypertension.  He is on medications for those.  He has noticed more swelling in his feet recently.  He awakens at nighttime 2-3 times to void.  This does not seem to bother his sleep pattern that much however.  He has energy throughout the day.    Healthy Habits:   Regular Exercise: Yes  Sunscreen Use: Yes  Healthy Diet: Yes  Dental Visits Regularly: Yes  Seat Belt: Yes  Sexually active: Yes  Monthly Self Testicular Exams:  No  Hemoccults: No  Flex Sig: No  Colonoscopy: Yes  Lipid Profile: Yes  Glucose Screen: Yes      Immunization History   Administered Date(s) Administered     Hep A, historic 04/22/2010, 04/28/2011     Td, historic 07/02/2006     Tdap 04/28/2011     ZOSTER  05/04/2016     Immunization status: up to date and documented.    No exam data present     Current Outpatient Prescriptions   Medication Sig Dispense Refill     amLODIPine (NORVASC) 10 MG tablet TAKE 1 TABLET BY MOUTH EVERY DAY 90 tablet 0     atorvastatin (LIPITOR) 20 MG tablet TAKE 1 TABLET BY MOUTH DAILY 90 tablet 0     betamethasone dipropionate (DIPROLENE) 0.05 % ointment APPLY SPARINGLY EXTERNALLY TO THE AFFECTED AREA TWICE DAILY 30 g 0     celecoxib (CELEBREX) 200 MG capsule TK 1 C PO BID  2     HYDROcodone-acetaminophen 5-325 mg per tablet TAKE 1 T PO Q 6-8 HOURS PRN P 30 tablet 0     ketoconazole (NIZORAL) 2 % shampoo   0     traZODone (DESYREL) 100 MG tablet Take 1 tablet (100 mg total) by mouth bedtime. 90 tablet 0     No current facility-administered medications for this visit.      No past medical history on file.  Past Surgical History:   Procedure Laterality Date     DE KNEE SCOPE,MED OR LAT MENIS REPAIR      Description: Knee Arthroscopy With Medial Meniscus Repair;  Recorded: 02/11/2009;     Review of patient's allergies indicates no known allergies.  No family history on file.  Social History     Social History     Marital status:      Spouse name: N/A     Number of children: N/A     Years of education: N/A     Occupational History     Not on file.     Social History Main Topics     Smoking status: Former Smoker     Types: Cigarettes     Quit date: 1/1/1990     Smokeless tobacco: Current User     Types: Chew     Alcohol use Yes     Drug use: No     Sexual activity: Yes     Partners: Female     Other Topics Concern     Not on file     Social History Narrative       Review of Systems  Review of Systems   Constitutional: Negative.  Negative for fatigue and fever.   HENT: Negative.  Negative for congestion.    Eyes: Negative.    Respiratory: Negative.  Negative for cough and shortness of breath.    Cardiovascular: Negative.  Negative for chest pain.   Gastrointestinal: Negative.  Negative for  "constipation and diarrhea.   Endocrine: Negative.  Negative for polydipsia and polyuria.   Genitourinary: Negative.  Negative for dysuria and frequency.   Musculoskeletal: Positive for back pain.   Skin: Negative.    Allergic/Immunologic: Negative.    Neurological: Negative.  Negative for headaches.   Hematological: Negative.              Objective:     Vitals:    05/31/17 0800   BP: 124/82   Pulse: 60   Resp: 16   Temp: 98.3  F (36.8  C)   TempSrc: Oral   Weight: (!) 231 lb (104.8 kg)   Height: 5' 9.25\" (1.759 m)     Body mass index is 33.87 kg/(m^2).    Physical  Physical Exam   Constitutional: He is oriented to person, place, and time. He appears well-developed and well-nourished. No distress.   HENT:   Right Ear: External ear normal.   Left Ear: External ear normal.   Mouth/Throat: Oropharynx is clear and moist.   Eyes: Conjunctivae and EOM are normal. Pupils are equal, round, and reactive to light.   Neck: Normal range of motion. Neck supple. No JVD present. No thyromegaly present.   Cardiovascular: Normal rate, regular rhythm and normal heart sounds.    No murmur heard.  Pulmonary/Chest: Effort normal and breath sounds normal. No respiratory distress.   Abdominal: Soft. Bowel sounds are normal. He exhibits no mass. There is no tenderness.   Genitourinary: Penis normal.   Musculoskeletal: Normal range of motion. He exhibits no edema.   Lymphadenopathy:     He has no cervical adenopathy.   Neurological: He is alert and oriented to person, place, and time. He has normal reflexes. No cranial nerve deficit.   Skin: Skin is warm and dry. No rash noted.   Psychiatric: He has a normal mood and affect.              "

## 2021-06-11 NOTE — TELEPHONE ENCOUNTER
Controlled Substance Refill Request  Medication Name:   Requested Prescriptions     Pending Prescriptions Disp Refills     zolpidem (AMBIEN) 10 mg tablet 30 tablet 2     Sig: Take 0.5-1 tablets (5-10 mg total) by mouth at bedtime as needed for sleep.     Date Last Fill: 2/11/20  [facility]ed [facility]acy: [facility]  Submit electronically to pharmacy  Controlled Substance Agreement on file:   Encounter-Level CSA Scan Date:    There are no encounter-level csa scan date.        Last office visit:  3/18/20

## 2021-06-12 NOTE — TELEPHONE ENCOUNTER
"Pt calling  He was exposed to son in law who tested + to covid 19 on 10/31/2020  Has tested negative x 2, last neg test 10/6  Sx started 3 weeks ago  \"I have puppy lips\"  Lips are swollen & feel tingly  Lightheaded, not dependent on position changes  +mild dry cough  Started back on Lipitor 10/12/2020 & is wondering if this can be SE  + chest tightness,denies pain or SOB  +body aches   No diarrhea  No fever  Eye are very red  Per protocol call placed via Known to PCC  Spoke with Karin.  Routed chart to PCC team   Bia Chiu RN  [place] Nurse Advisor    Reason for Disposition    HIGH RISK patient (e.g., age > 64 years, diabetes, heart or lung disease, weak immune system) (Exception: Has already been evaluated by healthcare provider and has no new or worsening symptoms)    Additional Information    Negative: SEVERE difficulty breathing (e.g., struggling for each breath, speaks in single words)    Negative: Difficult to awaken or acting confused (e.g., disoriented, slurred speech)    Negative: Bluish (or gray) lips or face now    Negative: Shock suspected (e.g., cold/pale/clammy skin, too weak to stand, low BP, rapid pulse)    Negative: Sounds like a life-threatening emergency to the triager    Negative: [1] COVID-19 exposure AND [2] no symptoms    Negative: COVID-19 and Breastfeeding, questions about    Negative: [1] Adult with possible COVID-19 symptoms AND [2] triager concerned about severity of symptoms or other causes    Negative: SEVERE or constant chest pain or pressure (Exception: mild central chest pain, present only when coughing)    Negative: MODERATE difficulty breathing (e.g., speaks in phrases, SOB even at rest, pulse 100-120)    Negative: Patient sounds very sick or weak to the triager    Negative: [1] Fever > 100.0 F (37.8 C) AND [2] bedridden (e.g., nursing home patient, CVA, chronic illness, recovering from surgery)    Negative: Fever > 103 F (39.4 C)    Negative: Chest pain or pressure    " Negative: MILD difficulty breathing (e.g., minimal/no SOB at rest, SOB with walking, pulse <100)    [facility] Specific Disposition - [facility] Specific Patient Instructions  COVID 19 Nurse Triage Plan/Patient Instructions    Please be aware that novel coronavirus (COVID-19) may be circulating in the community. If you develop symptoms such as fever, cough, or SOB or if you have concerns about the presence of another infection including coronavirus (COVID-19), please contact your health care provider or visit [email or web address].       Virtual Visit with provider recommended. Reference Visit Selection Guide.    Thank you for taking steps to prevent the spread of this virus.  Limit your contact with others.  Wear a simple mask to cover your cough.  Wash your hands well and often.    Resources  [facility]: About COVID-19: [email or web address]  CDC: What to Do If You're Sick: www.cdc.gov/coronavirus/2019-ncov/about/steps-when-sick.html  CDC: Ending Home Isolation: www.cdc.gov/coronavirus/2019-ncov/hcp/disposition-in-home-patients.html   CDC: Caring for Someone: www.cdc.gov/coronavirus/2019-ncov/if-you-are-sick/care-for-someone.html   [facility]: Interim Guidance for Hospital Discharge to Home: www.health.[facility].mn.us/diseases/coronavirus/hcp/hospdischarge.pdf  [facility] clinical trials (COVID-19 research studies): [email or web address].[facility]-clinical-trials   Below are the COVID-19 hotlines at the [facility] of Health ([facility]). Interpreters are available.   For health questions: Call 128-533-1473 or 1-323.926.6117 (7 a.m. to 7 p.m.)  For questions about schools and childcare: Call 546-298-6224 or 1-252.535.3580 (7 a.m. to 7 p.m.)    Protocols used: CORONAVIRUS (COVID-19) DIAGNOSED OR [id number]-I-CJ 8.4.20

## 2021-06-12 NOTE — TELEPHONE ENCOUNTER
Spoke with Dr. Partida who would recommend patient present to the ER.  I called and notified patient and he will present to [facility]

## 2021-06-12 NOTE — PROGRESS NOTES
Assessment and Plan:     Patient has been advised of split billing requirements and indicates understanding: Yes     1. Routine general medical examination at a health care facility  Medicare annual wellness visit completed today.  Discussed routine healthcare maintenance.  Preventative cares reviewed.  Prostate screening completed today based on age criteria.  Had flu shot administered earlier in the week at pharmacy.  Will obtain the following labs as noted below.  Follow-up in 1 year for annual exam or sooner with any new concerns.    2. Benign Essential Hypertension  Blood pressure remains well controlled on current dose of amlodipine.  Continue at current dose.  Will check metabolic panel today to ensure stable renal function and electrolytes.  - Comprehensive Metabolic Panel    3. Hyperlipidemia LDL goal <100  Reviewed history of hyperlipidemia.  Will check fasting lipids today.  Encouraged healthy lifestyle modifications in regards to diet and exercise.  - Lipid Cascade FASTING    4. Hyperbilirubinemia  History of mildly elevated bilirubin.  Will check liver function today to ensure stability.  - Comprehensive Metabolic Panel    5. Obstructive Sleep Apnea  Chronic and stable.    6. Screening for prostate cancer  We will check PSA today.  Prostate exam completed today.  - PSA, Annual Screen (Prostatic-Specific Antigen)      The patient's current medical problems were reviewed.    The following high BMI interventions were performed this visit: encouragement to exercise and lifestyle education regarding diet  The following health maintenance schedule was reviewed with the patient and provided in printed form in the after visit summary:   Health Maintenance   Topic Date Due     HIV SCREENING  06/05/1970     TD 18+ HE  04/28/2021     MEDICARE ANNUAL WELLNESS VISIT  10/06/2021     LIPID  09/26/2024     ADVANCE CARE PLANNING  10/06/2025     COLORECTAL CANCER SCREENING  05/29/2028     HEPATITIS C SCREENING   Completed     INFLUENZA VACCINE RULE BASED  Completed     TDAP ADULT ONE TIME DOSE  Completed     ZOSTER VACCINES  Completed     Pneumococcal Vaccine: Pediatrics (0 to 5 Years) and At-Risk Patients (6 to 64 Years)  Aged Out     HEPATITIS B VACCINES  Aged Out        Subjective:   Chief Complaint: Jay Roth is an 65 y.o. male here for a Welcome to Medicare visit.   HPI: Past medical history significant for hypertension, hyperlipidemia, obesity, obstructive sleep apnea.  Approximately 1 year ago, was diagnosed with diverticulitis and treated with Augmentin.  He has had some symptoms of colitis flaring on and off over the last year, overall stable.  No new concerns in this regard today.  He remains on amlodipine 10 mg for blood pressure management.  Doing well at current dose.  He has history of hyperlipidemia.  Was previously on atorvastatin 20 mg daily.  This was stopped when he was in the hospital.  He will consider restarting based on blood work today.  Previously on smokeless tobacco.  Quit all tobacco a few months ago.  He has history of obstructive sleep apnea, intolerant to CPAP machine.  He does have some new symptoms of urinary frequency at night intermittently.  Overall stable.  May consider starting medication to help with BPH in the future.  He denies any recent illness.  No chest pain or shortness of breath.  No additional concerns today.    Review of Systems:  Please see above.  The rest of the review of systems are negative for all systems.    Patient Care Team:  Aravind Villa MD as PCP - General (Family Medicine)  Aravind Villa MD as Assigned PCP     Patient Active Problem List   Diagnosis     Smokeless tobacco use     Hyperlipidemia LDL goal <100     Benign Prostatic Hypertrophy     Cerumen Impaction     Lower Back Pain     Diverticulosis     Hematuria     Obstructive Sleep Apnea     Benign Essential Hypertension     Insomnia     Colon polyp     History of Bell's palsy      Hyperbilirubinemia     Acute diverticulitis     Non-recurrent unilateral inguinal hernia without obstruction or gangrene     Diverticulitis     GALINDO (acute kidney injury) (H)     Acute kidney injury (H)     Nausea and vomiting, intractability of vomiting not specified, unspecified vomiting type     Moderate protein-calorie malnutrition (H)     Recurrent Clostridioides difficile diarrhea     Severe malnutrition (H)     Abdominal cramping     Emesis     Severe protein-calorie malnutrition (H)     Past Medical History:   Diagnosis Date     GALINDO (acute kidney injury) (H)      BPH (benign prostatic hyperplasia)      Clostridium difficile colitis     h/o 10/19     Diverticulitis      GERD (gastroesophageal reflux disease)      H/O Bell's palsy      Hematuria      Hyperbilirubinemia      Hyperlipidemia      Hypertension      Insomnia      Lower back pain      Right inguinal hernia      Sleep apnea     Minor Sleep Apnea not CPAP      Past Surgical History:   Procedure Laterality Date     HERNIA REPAIR       VT ESOPHAGOGASTRODUODENOSCOPY TRANSORAL DIAGNOSTIC N/A 12/26/2019    Procedure: ESOPHAGOGASTRODUODENOSCOPY (EGD) with biopsies;  Surgeon: Tamir Serna MD;  Location: [facility];  Service: Gastroenterology     VT KNEE SCOPE,MED OR LAT MENIS REPAIR      Description: Knee Arthroscopy With Medial Meniscus Repair;  Recorded: 02/11/2009;     VT LAP,SURG,COLECTOMY, PARTIAL, W/ANAST N/A 12/5/2019    Procedure: LAPAROSCOPIC sIGMOIDECTOMY;  Surgeon: Isi Lyles MD;  Location: [facility] OR;  Service: General     SIGMOIDOSCOPY N/A 12/24/2019    Procedure: Flexible SIGMOIDOSCOPY;  Surgeon: Isi Lyles MD;  Location: [facility];  Service: Gastroenterology     SIGMOIDOSCOPY N/A 12/27/2019    Procedure: Flexible SIGMOIDOSCOPY with biopsy;  Surgeon: Isi Lyles MD;  Location: [facility];  Service: Gastroenterology      No family history on file.   Social History     Socioeconomic History      Marital status:      Spouse name: Not on file     Number of children: Not on file     Years of education: Not on file     Highest education level: Not on file   Occupational History     Not on file   Social Needs     Financial resource strain: Not on file     Food insecurity     Worry: Not on file     Inability: Not on file     Transportation needs     Medical: Not on file     Non-medical: Not on file   Tobacco Use     Smoking status: Former Smoker     Types: Cigarettes     Quit date: 1990     Years since quittin.7     Smokeless tobacco: Former User     Types: Chew     Tobacco comment: chew daily   Substance and Sexual Activity     Alcohol use: Yes     Comment: occasional     Drug use: Yes     Comment: medical cannabis     Sexual activity: Yes     Partners: Female   Lifestyle     Physical activity     Days per week: Not on file     Minutes per session: Not on file     Stress: Not on file   Relationships     Social connections     Talks on phone: Not on file     Gets together: Not on file     Attends [employer] service: Not on file     Active member of club or organization: Not on file     Attends meetings of clubs or organizations: Not on file     Relationship status: Not on file     Intimate partner violence     Fear of current or ex partner: Not on file     Emotionally abused: Not on file     Physically abused: Not on file     Forced sexual activity: Not on file   Other Topics Concern     Not on file   Social History Narrative     Not on file       Current Outpatient Medications   Medication Sig Dispense Refill     amLODIPine (NORVASC) 10 MG tablet Take 0.5 tablets (5 mg total) by mouth daily. 90 tablet 3     medical cannabis (Patient's own supply) by Other route see administration instructions. (The purpose of this order is to document that the patient reports taking medical cannabis. This is not a prescription, and is not used to certify that the patient has a  qualifying medical condition.)        "zolpidem (AMBIEN) 10 mg tablet Take 0.5-1 tablets (5-10 mg total) by mouth at bedtime as needed for sleep. 30 tablet 2     No current facility-administered medications for this visit.       Objective:   Vital Signs:   Visit Vitals  /62 (Patient Site: Left Arm, Patient Position: Sitting, Cuff Size: Adult Large)   Pulse 66   Ht 5' 9.75\" (1.772 m)   Wt (!) 248 lb 8 oz (112.7 kg)   BMI 35.91 kg/m         EKG:  Not indicated.     VisionScreening:   Hearing Screening    125Hz 250Hz 500Hz 1000Hz 2000Hz 3000Hz 4000Hz 6000Hz 8000Hz   Right ear:            Left ear:               Visual Acuity Screening    Right eye Left eye Both eyes   Without correction:      With correction: 20/20 20/20 20/16        PHYSICAL EXAM      General Appearance:    Alert, cooperative, no distress, appears stated age.     Head:    Normocephalic, without obvious abnormality, atraumatic   Eyes:    PERRL, conjunctiva/corneas clear, EOM's intact, fundi     benign, both eyes        Ears:    Normal TM's and external ear canals, both ears   Nose:   Nares normal, septum midline, mucosa normal, no drainage    or sinus tenderness   Throat:   Lips, mucosa, and tongue normal; teeth and gums normal   Neck:   Supple, symmetrical, trachea midline, no adenopathy;        thyroid:  No enlargement/tenderness/nodules; no carotid    bruit or JVD   Back:     Symmetric, no curvature, ROM normal, no CVA tenderness   Lungs:     Clear to auscultation bilaterally, respirations unlabored   Chest wall:    No tenderness or deformity   Heart:    Regular rate and rhythm, S1 and S2 normal, no murmur, rub   or gallop   Abdomen:     Soft, non-tender, bowel sounds active all four quadrants,     no masses, no organomegaly.     Genitalia:    Normal male without lesion, discharge or tenderness.  No inguinal hernia noted.     Rectal:    Normal tone.  Prostate normal/symmetric, no masses or tenderness.   Extremities:   Extremities normal, atraumatic, no cyanosis or edema   Pulses:   " 2+ and symmetric all extremities   Skin:   Skin color, texture, turgor normal, no rashes or lesions   Lymph nodes:   Cervical, supraclavicular, and axillary nodes normal   Neurologic:   CNII-XII intact. Normal strength, sensation and reflexes       throughout           Assessment Results 10/6/2020   Activities of Daily Living No help needed   Instrumental Activities of Daily Living No help needed   Get Up and Go Score Less than 12 seconds   Mini Cog Total Score 5   Some recent data might be hidden     A Mini Cog score of 0-2 suggests the possibility of dementia, score of 3-5 suggests no dementia    Identified Health Risks:     The patient was provided with suggestions to help him develop a healthy physical lifestyle.   The patient was counseled and encouraged to consider modifying their diet and eating habits. He was provided with information on recommended healthy diet options.  Patient's advanced directive was discussed and I am comfortable with the patient's wishes.

## 2021-06-13 NOTE — PROGRESS NOTES
"Jay Roth is a 65 y.o. male who is being evaluated via a billable telephone visit.      The patient has been notified of following:     \"This telephone visit will be conducted via a call between you and your physician/provider. We have found that certain health care needs can be provided without the need for a physical exam.  This service lets us provide the care you need with a short phone conversation.  If a prescription is necessary we can send it directly to your pharmacy.  If lab work is needed we can place an order for that and you can then stop by our lab to have the test done at a later time.    Telephone visits are billed at different rates depending on your insurance coverage. During this emergency period, for some insurers they may be billed the same as an in-person visit.  Please reach out to your insurance provider with any questions.    If during the course of the call the physician/provider feels a telephone visit is not appropriate, you will not be charged for this service.\"    Patient has given verbal consent to a Telephone visit? Yes    What phone number would you like to be contacted at? [phone]    Patient would like to receive their AVS by AVS Preference: Juan.     Assessment/Plan:    1. Fatigue, unspecified type  Reviewed recent ED notes from 11/9. Labs in the hospital were overall unremarkable except for elevated d dimer. CTA was negative. Patient will be undergoing stress testing for further evaluation of fatigue. Will await results and determine if further work up is indicated.     2. Allergic reaction, initial encounter  Symptoms of headaches, lip tingling, eye swelling started around the time he restarted his atorvastatin. He has not started any other new medications. COVID test was negative last week. I suspect allergic type reaction given the timing although he has tolerated statin in the past. Will have him hold the atorvastatin for 2 weeks. If symptoms persist, he will " start an antihistamine. He should follow up if symptom fail to improve.       Subjective:    Jay Roth is a 65-year-old male here today to discuss concerns of lip tingling, eye redness and puffiness along with headaches and a foggy sensation and dry cough.  Past medical history significant for hyperlipidemia, sleep apnea, hypertension, anxiety.  Patient has had these symptoms for the last month or so.  He was seen for physical exam in mid October and was asymptomatic at that time.  He started taking atorvastatin shortly after his physical after having elevated cholesterol levels.  Started shortly after restarting his atorvastatin.  He is not sure if it is necessarily related to restarting the atorvastatin or if it is something else.  Symptoms are stable and not worsening.  He reports being exposed to COVID-19 by his son 2 weeks ago.  He has been tested twice for COVID-19 and has been negative both times.  He has been having some fatigue, chest pain.  He was recommended the ER evaluation earlier this week.  ER evaluation was overall unremarkable.  His labs were stable.  CTA was negative.  There were no signs of angioedema in the ER and no medications that were suspected to cause angioedema.  He had no signs of respiratory/airway issues.  Due to his chest pain and fatigue, referral to cardiology was placed.  Patient had virtual visit with cardiologist earlier this week.  He is undergoing a stress test to further evaluate his heart.  Otherwise, no changes to his medications were made.  Review of systems is as stated in HPI, and the remainder of the 10 system review is otherwise unremarkable.      EXAM: XR CHEST 2 VIEWS LOCATION: [facility] DATE/TIME: 11/9/2020 3:00 PM INDICATION: chest pain COMPARISON: 11/12/2008.      The lungs are clear. The heart size and pulmonary vascularity are normal. No pneumothorax or pleural effusion. Mild degenerative change in the thoracic spine without  fracture.     Cta Chest Pe Run     Result Date: 11/9/2020  EXAM: CTA CHEST PE RUN LOCATION: [facility] DATE/TIME: 11/9/2020 6:08 PM INDICATION: chst pain COMPARISON: 03/20/2019 TECHNIQUE: CT chest pulmonary angiogram during arterial phase injection of IV contrast. Multiplanar reformats and MIP reconstructions were performed. Dose reduction techniques were used. CONTRAST: Iohexol (Omni) 78mL FINDINGS: ANGIOGRAM CHEST: Pulmonary arteries are normal caliber and negative for pulmonary emboli. Thoracic aorta is negative for dissection. No CT evidence of right heart strain. LUNGS AND PLEURA: Normal. MEDIASTINUM/AXILLAE: Normal. UPPER ABDOMEN: Simple cyst right kidney. There is an ovoid exophytic stable excrescence off the inferior left hepatic lobe MUSCULOSKELETAL: Normal.      1.  No pulmonary embolism. 2.  Simple cyst right kidney. No follow-up needed.          Past Medical History, Family History, and Social History reviewed.    Past Surgical History:   Procedure Laterality Date     HERNIA REPAIR       GA ESOPHAGOGASTRODUODENOSCOPY TRANSORAL DIAGNOSTIC N/A 12/26/2019    Procedure: ESOPHAGOGASTRODUODENOSCOPY (EGD) with biopsies;  Surgeon: Tamir Serna MD;  Location: [facility];  Service: Gastroenterology     GA KNEE SCOPE,MED OR LAT MENIS REPAIR      Description: Knee Arthroscopy With Medial Meniscus Repair;  Recorded: 02/11/2009;     GA LAP,SURG,COLECTOMY, PARTIAL, W/ANAST N/A 12/5/2019    Procedure: LAPAROSCOPIC sIGMOIDECTOMY;  Surgeon: Isi Lyles MD;  Location: [facility] Main OR;  Service: General     SIGMOIDOSCOPY N/A 12/24/2019    Procedure: Flexible SIGMOIDOSCOPY;  Surgeon: Isi Lyles MD;  Location: [facility] GI;  Service: Gastroenterology     SIGMOIDOSCOPY N/A 12/27/2019    Procedure: Flexible SIGMOIDOSCOPY with biopsy;  Surgeon: Isi Lyles MD;  Location: [facility];  Service: Gastroenterology        No family history on file.     Past Medical  History:   Diagnosis Date     GALINDO (acute kidney injury) (H)      BPH (benign prostatic hyperplasia)      Clostridium difficile colitis     h/o 10/19     Diverticulitis      GERD (gastroesophageal reflux disease)      H/O Bell's palsy      Hematuria      Hyperbilirubinemia      Hyperlipidemia      Hypertension      Insomnia      Lower back pain      Right inguinal hernia      Sleep apnea     Minor Sleep Apnea not CPAP        Social History     Tobacco Use     Smoking status: Former Smoker     Types: Cigarettes     Quit date: 1990     Years since quittin.8     Smokeless tobacco: Former User     Types: Chew     Tobacco comment: chew daily   Substance Use Topics     Alcohol use: Yes     Comment: occasional     Drug use: Yes     Comment: medical cannabis        Current Outpatient Medications   Medication Sig Dispense Refill     amLODIPine (NORVASC) 10 MG tablet Take 0.5 tablets (5 mg total) by mouth daily. 90 tablet 3     atorvastatin (LIPITOR) 20 MG tablet Take 1 tablet (20 mg total) by mouth daily. 90 tablet 1     dicyclomine (BENTYL) 10 MG capsule Take 1 capsule by mouth 3 (three) times a day as needed.       medical cannabis (Patient's own supply) by Other route see administration instructions. (The purpose of this order is to document that the patient reports taking medical cannabis. This is not a prescription, and is not used to certify that the patient has a  qualifying medical condition.)       multivitamin (ONE A DAY) per tablet Take 1 tablet by mouth daily.       sulfaSALAzine (AZULFIDINE) 500 mg tablet Take 2 tablets by mouth 2 (two) times a day.       zolpidem (AMBIEN) 10 mg tablet Take 0.5-1 tablets (5-10 mg total) by mouth at bedtime as needed for sleep. 30 tablet 2     No current facility-administered medications for this visit.           Objective:      General:   Patient is pleasant over the phone.  No signs of respiratory distress, coughing etc.           This note has been dictated using voice  recognition software. Any grammatical or context distortions are unintentional and inherent to the use of this software.     Phone call duration:  11 minutes    Rivka Hubbard, CNP

## 2021-06-13 NOTE — PROGRESS NOTES
"Jay Roth is a 65 y.o. male who is being evaluated via a billable telephone visit.      The patient has been notified of following:     \"This telephone visit will be conducted via a call between you and your physician/provider. We have found that certain health care needs can be provided without the need for a physical exam.  This service lets us provide the care you need with a short phone conversation.  If a prescription is necessary we can send it directly to your pharmacy.  If lab work is needed we can place an order for that and you can then stop by our lab to have the test done at a later time.    Telephone visits are billed at different rates depending on your insurance coverage. During this emergency period, for some insurers they may be billed the same as an in-person visit.  Please reach out to your insurance provider with any questions.    If during the course of the call the physician/provider feels a telephone visit is not appropriate, you will not be charged for this service.\"    Patient has given verbal consent to a Telephone visit? Yes    What phone number would you like to be contacted at? [phone]    Patient would like to receive their AVS by AVS Preference: Juan.    Assessment/Plan:    1. Depression, unspecified depression type  PHQ9 score of 7 today. Will start escitalopram 10 mg for management of depression.We discussed the option for referral for therapy however patient declines at this time.He will follow up in 4 weeks to reassess depression.  - escitalopram oxalate (LEXAPRO) 10 MG tablet; Take 1 tablet (10 mg total) by mouth daily.  Dispense: 30 tablet; Refill: 2    2. Allergic reaction, initial encounter  Symptoms of allergic reaction resolved roughly one week after discontinuing atorvastatin.    3.  Hyperlipidemia  We will switch to pravastatin 20 mg daily.  If patient develops any reaction, he should immediately stop medication and notify us.  -Pravastatin 20 mg    The " following are part of a depression follow up plan for the patient:  mental health screening assessment, mental brenda screening education, mental health treatment assessment and mental health treatment education    Subjective:    Jay Roth is a 65 year old male seen today for follow up on possible allergic reaction. He had a virtual visit 3 weeks ago to discuss symptoms of headache, fatigue, lip tingling and headaches. He noticed these symptoms shortly after restarting atorvastatin following a hospitalization. This was strange however because he had been on atorvastatin prior. His work up in the ER was negative. We decided to have him hold the atorvastatin for a few weeks and possible add an antihistamine. His symptoms resolved within the week of stopping the statin. He never had to take the allergy medication. He reports feeling much better today. He is wondering if he should be on a different cholesterol lowering medication.     He would like to talk about possibly starting an antidepressant. He doesn't feel that he is necessarily depressed but does feel somewhat removed and less interested in normal interests. His wife feels that he should be on medication for depression. He is more irritable and all around down. He reports having little motivation since COVID pandemic and feels that this could be part of it. He denies any thoughts of self harm.  Review of systems is as stated in HPI, and the remainder of the 10 system review is otherwise unremarkable.    Past Medical History, Family History, and Social History reviewed.    Past Surgical History:   Procedure Laterality Date     HERNIA REPAIR       HI ESOPHAGOGASTRODUODENOSCOPY TRANSORAL DIAGNOSTIC N/A 12/26/2019    Procedure: ESOPHAGOGASTRODUODENOSCOPY (EGD) with biopsies;  Surgeon: Tamir Serna MD;  Location: [facility];  Service: Gastroenterology     HI KNEE SCOPE,MED OR LAT MENIS REPAIR      Description: Knee Arthroscopy With Medial Meniscus Repair;   Recorded: 2009;     RI LAP,SURG,COLECTOMY, PARTIAL, W/ANAST N/A 2019    Procedure: LAPAROSCOPIC sIGMOIDECTOMY;  Surgeon: Isi Lyles MD;  Location: [facility] OR;  Service: General     SIGMOIDOSCOPY N/A 2019    Procedure: Flexible SIGMOIDOSCOPY;  Surgeon: Isi Lyles MD;  Location: [facility] GI;  Service: Gastroenterology     SIGMOIDOSCOPY N/A 2019    Procedure: Flexible SIGMOIDOSCOPY with biopsy;  Surgeon: Isi Lyles MD;  Location: [facility];  Service: Gastroenterology        No family history on file.     Past Medical History:   Diagnosis Date     GALINDO (acute kidney injury) (H)      BPH (benign prostatic hyperplasia)      Clostridium difficile colitis     h/o 10/19     Diverticulitis      GERD (gastroesophageal reflux disease)      H/O Bell's palsy      Hematuria      Hyperbilirubinemia      Hyperlipidemia      Hypertension      Insomnia      Lower back pain      Right inguinal hernia      Sleep apnea     Minor Sleep Apnea not CPAP        Social History     Tobacco Use     Smoking status: Former Smoker     Types: Cigarettes     Quit date: 1990     Years since quittin.9     Smokeless tobacco: Former User     Types: Chew     Tobacco comment: chew daily   Substance Use Topics     Alcohol use: Yes     Comment: occasional     Drug use: Yes     Comment: medical cannabis        Current Outpatient Medications   Medication Sig Dispense Refill     amLODIPine (NORVASC) 10 MG tablet Take 0.5 tablets (5 mg total) by mouth daily. 90 tablet 3     dicyclomine (BENTYL) 10 MG capsule Take 1 capsule by mouth 3 (three) times a day as needed.       medical cannabis (Patient's own supply) by Other route see administration instructions. (The purpose of this order is to document that the patient reports taking medical cannabis. This is not a prescription, and is not used to certify that the patient has a  qualifying medical condition.)       multivitamin (ONE A DAY)  per tablet Take 1 tablet by mouth daily.       sulfaSALAzine (AZULFIDINE) 500 mg tablet Take 2 tablets by mouth 2 (two) times a day.       zolpidem (AMBIEN) 10 mg tablet Take 0.5-1 tablets (5-10 mg total) by mouth at bedtime as needed for sleep. 30 tablet 2     atorvastatin (LIPITOR) 20 MG tablet Take 1 tablet (20 mg total) by mouth daily. 90 tablet 1     escitalopram oxalate (LEXAPRO) 10 MG tablet Take 1 tablet (10 mg total) by mouth daily. 30 tablet 2     No current facility-administered medications for this visit.           Objective:    General:  Patient is pleasant over the phone. No sign of distress. Answers questions appropriately.            This note has been dictated using voice recognition software. Any grammatical or context distortions are unintentional and inherent to the use of this software.       Phone call duration:  9 minutes    Rivka Hubbard, CNP

## 2021-06-14 NOTE — PROGRESS NOTES
Jay Roth is a 65 y.o. male who is being evaluated via a billable telephone visit.      What phone number would you like to be contacted at? [phone]  How would you like to obtain your AVS? AVS Preference: Juan.  Assessment & Plan     Depression, unspecified depression type  Doing much better since starting escitalopram oxalate 10 mg daily.  Patient would like to continue at current dose at this time.  He will notify us if depressive symptoms worsen or with any new concerns.    Hyperlipidemia LDL goal <100  Tolerating pravastatin 20 mg well without sign of reaction.  We will have him continue at current dose.  He is advised to schedule lab appointment in 2 to 3 months to recheck cholesterol.  Encouraged healthy lifestyle modifications in regards to diet and exercise.    12 minutes spent on the date of the encounter doing chart review, history and exam, documentation and further activities as noted above.       No follow-ups on file.    Rivka Hubbard [facility]     Jay Roth is 65 y.o. and presents to clinic today for the following health issues: follow up.  HPI     Patient is here for follow-up telephone visit.  Last visit was approximately 1 month ago.  At that time, started escitalopram oxalate 10 mg daily for management of depression.  Today, patient states that his symptoms of irritability, depressed/down mood, daytime fatigue have improved since starting Escitalopram.  He is tolerating the medication well without adverse reactions.  He feels that current dose is appropriate and wishes to stay on 10 mg at this time.  He is sleeping fairly well at night.  No longer sleeping throughout the day.  We also switched from atorvastatin to pravastatin due to possible allergic reaction to the atorvastatin.  Patient is tolerating pravastatin well.  Denies any facial swelling, rashes, muscle aches or any other issues.  He continues use of amlodipine for blood  "pressure management.  He does not have any additional concerns at this time.  Hyperlipidemia Follow-Up      Are you regularly taking any medication or supplement to lower your cholesterol?   Yes- Pravastatin    Are you having muscle aches or other side effects that you think could be caused by your cholesterol lowering medication?  No    Depression Followup    How are you doing with your depression since your last visit?: Better    Are you having other symptoms that might be associated with depression?:No    Have you had a significant life event?: No    Are you feeling anxious or having panic attacks?:  No    Social History     Tobacco Use     Smoking status: Former Smoker     Types: Cigarettes     Quit date: 1990     Years since quittin.0     Smokeless tobacco: Former User     Types: Chew     Tobacco comment: chew daily   Substance Use Topics     Alcohol use: Yes     Comment: occasional     Drug use: Yes     Comment: medical cannabis       Review of Systems    Review of Systems - pertinent positives noted in HPI, otherwise ROS is negative.        Objective    Vitals - Patient Reported  Systolic (Patient Reported): 140  Diastolic (Patient Reported): 90  Weight (Patient Reported): 250 lb (113.4 kg)  Height (Patient Reported): 5' 10\" (177.8 cm)  BMI (Based on Pt Reported Ht/Wt): 35.87  Vitals:  No vitals were obtained today due to virtual visit.    Physical Exam    General:  Patient is pleasant and cooperative over the phone. No obvious sounds of distress. Answers questions appropriately.          Phone call duration: 8 minutes  "

## 2021-06-15 NOTE — PROGRESS NOTES
Assessment/Plan:     1. Diverticulitis  Unsure of exact cause.  Diverticulitis seems likely due to past history.  Recommended getting basic labs and possible imaging but patient does decline due to high deductible plan.  He does not have any significant symptoms I think it would be reasonable at this time to start trial of antibiotics and bland diet.  If symptoms worsen or do not improve he becomes fever significant pain stool is worsening or there is more significant blood call or return to care.  - ciprofloxacin HCl (CIPRO) 500 MG tablet; Take 1 tablet (500 mg total) by mouth 2 (two) times a day for 7 days.  Dispense: 14 tablet; Refill: 0          Subjective:      Jay Roth is a 62 y.o. male is in today concerned with possible diverticulitis.  Is my first time meeting with him as he typically follows with another primary care provider office.  Reviewed last visit note and labs.  Patient did have CT scan but it has been since 2003 so full results were not reviewed as they are in archived.  Patient states that for about a week he has had loose stool in the morning.  He states it is 1-2 loose stools typically in the first 2-3 hours after getting up and then does not continue throughout the day.  He states the stools are slightly loose and he believes that at least in the last couple days there has been some blood.  He states he has minimal low abdominal pressure and cramping but no significant pain he has had no fevers.  He states he has a history of diverticulitis and this feels very similar.  He had a colonoscopy in 2009.  States he does have history of rectal fissure but this does not feel the same.  He states he would like to try to hold off on significant workup due to high deductible plan.  Wondering if he can get antibiotics.    Current Outpatient Prescriptions   Medication Sig Dispense Refill     amLODIPine (NORVASC) 10 MG tablet TAKE 1 TABLET BY MOUTH DAILY 90 tablet 3     atorvastatin (LIPITOR) 20 MG  "tablet TAKE 1 TABLET BY MOUTH DAILY 90 tablet 3     betamethasone dipropionate (DIPROLENE) 0.05 % ointment APPLY SPARINGLY EXTERNALLY TO THE AFFECTED AREA TWICE DAILY 30 g 0     celecoxib (CELEBREX) 200 MG capsule TK 1 C PO BID  2     ketoconazole (NIZORAL) 2 % shampoo   0     ciprofloxacin HCl (CIPRO) 500 MG tablet Take 1 tablet (500 mg total) by mouth 2 (two) times a day for 7 days. 14 tablet 0     HYDROcodone-acetaminophen 5-325 mg per tablet TAKE 1 T PO Q 6-8 HOURS PRN P 30 tablet 0     traZODone (DESYREL) 100 MG tablet Take 1 tablet (100 mg total) by mouth bedtime. 90 tablet 0     No current facility-administered medications for this visit.      No Known Allergies  Past Medical History, Family History, and Social History reviewed.  No past medical history on file.  Past Surgical History:   Procedure Laterality Date     W[place] KNEE SCOPE,MED OR LAT MENIS REPAIR      Description: Knee Arthroscopy With Medial Meniscus Repair;  Recorded: 02/11/2009;     Review of patient's allergies indicates no known allergies.  No family history on file.  Social History     Social History     Marital status:      Spouse name: N/A     Number of children: N/A     Years of education: N/A     Occupational History     Not on file.     Social History Main Topics     Smoking status: Former Smoker     Types: Cigarettes     Quit date: 1/1/1990     Smokeless tobacco: Current User     Types: Chew     Alcohol use Yes     Drug use: No     Sexual activity: Yes     Partners: Female     Other Topics Concern     Not on file     Social History Narrative         Review of systems is as stated in HPI, and the remainder of the 10 system review is otherwise negative.    Objective:     Vitals:    02/08/18 1002   BP: 130/70   Patient Site: Right Arm   Patient Position: Sitting   Cuff Size: Adult Large   Pulse: 98   SpO2: 98%   Weight: (!) 245 lb 4.8 oz (111.3 kg)   Height: 5' 10\" (1.778 m)    Body mass index is 35.2 kg/(m^2).  Wt Readings from Last 3 " Encounters:   02/08/18 (!) 245 lb 4.8 oz (111.3 kg)   05/31/17 (!) 231 lb (104.8 kg)   02/23/17 (!) 244 lb (110.7 kg)       General Appearance:    Alert, cooperative, no distress, appears stated age    Head:    Normocephalic, without obvious abnormality, atraumatic   Eyes:    PERRL,  no conjunctivitis    Ears:    Normal external ear canals   Nose:   Mucosa normal, no drainage        Throat:   Oropharynx is clear   Neck:   Supple, symmetrical, no adenopathy, no thyromegally, no carotid bruit        Lungs:     Clear to auscultation bilaterally, respirations unlabored   Chest Wall:    No tenderness or deformity    Heart:    Regular rate and rhythm, S1 and S2 normal, no murmur, rub    or gallop       Abdomen:    Minimal tenderness in the lower abdomen, otherwise soft, non-tender, bowel sounds active all four quadrants,     no masses, no organomegaly, patient declines rectal exam           Extremities:   Extremities normal, atraumatic, no cyanosis or edema   Pulses:   2+ and symmetric all extremities       Psych:   grossly normal mood and affect without acute anxiety or psychosis    Skin:   No rashes or lesions             This note has been dictated using voice recognition software. Any grammatical or context distortions are unintentional and inherent to the software.

## 2021-06-16 PROBLEM — E43 SEVERE PROTEIN-CALORIE MALNUTRITION (H): Status: ACTIVE | Noted: 2020-01-24

## 2021-06-16 PROBLEM — E44.0 MODERATE PROTEIN-CALORIE MALNUTRITION (H): Status: ACTIVE | Noted: 2019-12-28

## 2021-06-16 PROBLEM — K57.92 DIVERTICULITIS: Status: ACTIVE | Noted: 2019-12-05

## 2021-06-16 PROBLEM — N17.9 AKI (ACUTE KIDNEY INJURY) (H): Status: ACTIVE | Noted: 2019-12-20

## 2021-06-16 PROBLEM — K57.92 ACUTE DIVERTICULITIS: Status: ACTIVE | Noted: 2019-09-26

## 2021-06-16 PROBLEM — E66.01 MORBID OBESITY (H): Status: ACTIVE | Noted: 2020-11-11

## 2021-06-16 PROBLEM — N17.9 ACUTE KIDNEY INJURY (H): Status: ACTIVE | Noted: 2019-12-20

## 2021-06-16 PROBLEM — R11.10 EMESIS: Status: ACTIVE | Noted: 2020-01-23

## 2021-06-16 PROBLEM — E80.6 HYPERBILIRUBINEMIA: Status: ACTIVE | Noted: 2019-09-26

## 2021-06-16 PROBLEM — R11.2 NAUSEA AND VOMITING, INTRACTABILITY OF VOMITING NOT SPECIFIED, UNSPECIFIED VOMITING TYPE: Status: ACTIVE | Noted: 2019-12-20

## 2021-06-16 PROBLEM — Z86.69 HISTORY OF BELL'S PALSY: Status: ACTIVE | Noted: 2019-09-26

## 2021-06-16 PROBLEM — K40.90 NON-RECURRENT UNILATERAL INGUINAL HERNIA WITHOUT OBSTRUCTION OR GANGRENE: Status: ACTIVE | Noted: 2019-11-27

## 2021-06-16 PROBLEM — A04.71 RECURRENT CLOSTRIDIOIDES DIFFICILE DIARRHEA: Status: ACTIVE | Noted: 2019-12-31

## 2021-06-16 PROBLEM — K63.5 COLON POLYP: Status: ACTIVE | Noted: 2018-06-06

## 2021-06-16 NOTE — PROGRESS NOTES
Subjective:   Grzegorz presents for follow-up from his evaluation for abdominal pain.  He was seen last week for abdominal pain associated with diarrhea and blood in the stool.  It was thought that this may be due to diverticulitis.  He was given ciprofloxacin.  He feels like the cramping and the pain has improved but he still will get loose stools in the morning.  He still has blood in the stool as well.  He does have a hemorrhoid but evaluation revealed no active bleeding of the hemorrhoid.  He denies fevers, sweats or chills.  He does have a history of diverticulitis in the past.  Of note is that he does state that he has been having thin stools off and on over the last year.  He denies constipation.  He denies weight loss.  Overall, he is feeling well.      Objective:  Abdomen: Abdomen is obese but soft.  No rebound, guarding or rigidity present.  No masses noted.  Bowel sounds are active throughout.  No hepatomegaly present.  There is tenderness noted to palpation in the left lower quadrant of the abdomen.  Back: No CVA tenderness noted.    Assessment:  1.  Abdominal pain left lower quadrant.  Most consistent with diverticulitis.  Improving on ciprofloxacin  2.  Diarrhea possibly secondary to diverticulitis  3.  Thin stools.  Rule out colon mass  4.  External hemorrhoid  5.  Bloody stools possibly secondary to diverticulitis.  Possibly secondary to external or internal hemorrhoids.      Plan:  Colonoscopy will be scheduled.  The patient wants to make his own appointment.  Order was placed in chart.  He will follow-up here after colonoscopy is completed.  He can use Preparation H on the hemorrhoid as needed.  Sitting in the tub also will help.

## 2021-06-17 NOTE — PROGRESS NOTES
Subjective:   Grzegorz comes in today with 2 concerns.  He has been having more back pain recently.  Back pain seems to stay in the right side of the low back.  It does not radiate at all.  He has no leg pain or weakness at all.  It is difficult to ambulate any distance secondary to pain in the back.  He does see the back specialists for this as well.  He also is here because of a cough.  Cough is been present for 2 months but worse over the last 2 weeks.  Cough is productive of a green phlegm.  He has had an associated sore throat as well.  He denies ear pain.  He has been having headaches.      Objective:  HEENT: Both TMs are gray.  Maxillary sinuses are tender to palpation.  Frontal areas are nontender today.  Nasal mucosa quite boggy.  Conjunctiva clear.  The pharynx has erythema but no exudate.  Uvula is midline.  Neck: Neck reveals no significant lymphadenopathy anteriorly or posteriorly.  Lungs: Lungs today reveal bronchial sounds anteriorly.  Scattered wheezes heard in both lung fields with forced expiration.  Patient is in no obvious respiratory distress at rest.  Cardiac: There is a regular rhythm present.  No murmur heard.  Musculoskeletal: The low back has palpable tenderness in the right lumbar musculature as well as the right sacroiliac area.  Straight leg raising today is negative.      Assessment:  1.  Bilateral maxillary sinusitis  2.  Pharyngitis strep negative  3.  Back pain consistent with sacroiliac strain      Plan:  Start azithromycin as directed.  Use cough medication as needed.  Take extra liquids.  Try Mucinex.  Continue pain meds for back pain.  Continues seeing the back specialist as well.  He will be called with any abnormal overnight strep results.

## 2021-06-17 NOTE — TELEPHONE ENCOUNTER
Patient has had a cough for 5 days. The cough started out dry, now patient has some phlegm. Patient denies any nasal congestion, has a mild sore throat on and off. Denies bodyaches, does have a headache. Patient has been checking a temp, highest has been 99 but patient reports waking up in the night sweating. Patient has some mild chest tightness, feels at times that it is hard to take a deep breath. Patient has had covid-19 vaccine over a month ago.    Patient is advised on ED/UC visit at this time. Patient prefers to be seen in UC. Patient denies further questions at this time.    Alicia Solorio RN, BSN Nurse Triage Advisor 9:19 AM 5/24/2021      Reason for Disposition    MILD difficulty breathing (e.g., minimal/no SOB at rest, SOB with walking, pulse <100)    Additional Information    Negative: SEVERE difficulty breathing (e.g., struggling for each breath, speaks in single words)    Negative: Difficult to awaken or acting confused (e.g., disoriented, slurred speech)    Negative: Bluish (or gray) lips or face now    Negative: Shock suspected (e.g., cold/pale/clammy skin, too weak to stand, low BP, rapid pulse)    Negative: Sounds like a life-threatening emergency to the triager    Negative: [1] COVID-19 exposure AND [2] has not completed COVID-19 vaccine series AND [3] no symptoms    Negative: [1] COVID-19 exposure AND [2] completed COVID-19 vaccine series (fully vaccinated) AND [3] no symptoms    Negative: COVID-19 vaccine reaction suspected (e.g., fever, headache, muscle aches) occurring during days 1-3 after getting vaccine    Negative: COVID-19 vaccine, questions about    Negative: [1] COVID-19 vaccine series completed (fully vaccinated) in past 3 months AND [2] new-onset of COVID-19 symptoms BUT [3] no known exposure    Negative: [1] Had lab test confirmed COVID-19 infection within last 3 monthsAND [2] new-onset of COVID-19 symptoms BUT [3] no known exposure    Negative: [1] Lives with someone known to have  influenza (flu test positive) AND [2] flu-like symptoms (e.g., cough, runny nose, sore throat, SOB; with or without fever)    Negative: [1] Adult with possible COVID-19 symptoms AND [2] triager concerned about severity of symptoms or other causes    Negative: COVID-19 and breastfeeding, questions about    Negative: SEVERE or constant chest pain or pressure (Exception: mild central chest pain, present only when coughing)    Negative: MODERATE difficulty breathing (e.g., speaks in phrases, SOB even at rest, pulse 100-120)    Negative: [1] Headache AND [2] stiff neck (can't touch chin to chest)    Protocols used: CORONAVIRUS (COVID-19) DIAGNOSED OR [id number] 3.25.21  COVID 19 Nurse Triage Plan/Patient Instructions    Please be aware that novel coronavirus (COVID-19) may be circulating in the community. If you develop symptoms such as fever, cough, or SOB or if you have concerns about the presence of another infection including coronavirus (COVID-19), please contact your health care provider or visit  [email or web address].    Disposition/Instructions    In-Person Visit with provider recommended. Reference Visit Selection Guide.    Thank you for taking steps to prevent the spread of this virus.  o Limit your contact with others.  o Wear a simple mask to cover your cough.  o Wash your hands well and often.    Resources    [facility]: About COVID-19: [email or web address]    CDC: What to Do If You're Sick: www.cdc.gov/coronavirus/2019-ncov/about/steps-when-sick.html    CDC: Ending Home Isolation: www.cdc.gov/coronavirus/2019-ncov/hcp/disposition-in-home-patients.html     CDC: Caring for Someone: www.cdc.gov/coronavirus/2019-ncov/if-you-are-sick/care-for-someone.html     [facility]: Interim Guidance for Hospital Discharge to Home: [email or web address].[email or web address]/[email or web address]/hcp/hospdischarge.pdf    [facility] clinical trials (COVID-19 research studies):  [email or web address]-clinical-trials     Below are the COVID-19 hotlines at the [place] Department of Health ([facility]). Interpreters are available.   o For health questions: Call 109-554-7653 or 1-599.916.8748 (7 a.m. to 7 p.m.)  For questions about schools and childcare: Call 831-142-7127 or 1-879.240.3287 (7 a.m. to 7 p.m.)

## 2021-06-17 NOTE — PROGRESS NOTES
ASSESSMENT AND PLAN  1. Cough  Symptomatic COVID-19 Virus (CORONAVIRUS) PCR    XR Chest 2 Views    benzonatate (TESSALON PERLES) 100 MG capsule    HM1(CBC and Differential)    Comprehensive Metabolic Panel   2. Hematuria, unspecified type  Urinalysis-UC if Indicated    Culture, Urine    HM1(CBC and Differential)    Comprehensive Metabolic Panel    Rapid Strep A Screen-Throat swab    Group A Strep PCR Throat Swab   3. Acute bronchitis, unspecified organism  benzonatate (TESSALON PERLES) 100 MG capsule      Chest x-ray was clear with no signs of pneumonia.  Lung exam showed no adventitious breath sounds, wheezes crackles or rales.  He was tachycardic upon arrival but this normalized during the visit.  Overall patient appears to have viral bronchitis or other viral URI.  Will treat supportively.  Covid test pending.  Discussed signs and symptoms that warrant return visit for recheck.  He does have cola colored urine today but his comprehensive metabolic panel is completely normal and his UA just shows some bacteria and red blood cells.  Given he has a known history of this could just be an acute exacerbation of the chronic condition.  Rapid strep was negative so not worried streptococcal glomerulonephritis.  Advised patient follow-up with his PCP in 1 to 2 weeks for recheck    35 minutes spent on the date of the encounter doing chart review, history and exam, documentation and further activities per the note    Peter Padilla PA-C    SUBJECTIVE  Chief Complaint   Patient presents with     Cough     x4 days     Chest Pain     x4 days     Headache     on/off other sx: bodyachces, no appetite x4day     HPI:  Jay Roth is a 65 y.o. male with a history of acute kidney injury, tobacco use, BPH, hyperlipidemia, hypertension, sleep apnea and GERD who presents today with  Flu like symptoms. Feels fatigued, achey, cough that is worse at night, some chest tightess no shortness of breath, no chest pain, nausea, vomiting or  abdominal pain, diarrhea.  Does note that his urine has been more red since Saturday.  He has had this cola colored urine before which has been worked up in the past.  His ankles are mildly swollen but this is normal for him.    ROS:  Pertinent ROS neg other than the symptoms noted above in the HPI.     OBJECTIVE  Vitals:    05/24/21 1138 05/24/21 1608   BP: 130/89    Patient Site: Left Arm    Patient Position: Sitting    Cuff Size: Adult Large    Pulse: (!) 104 85   Resp: 16    Temp: 99.5  F (37.5  C)    TempSrc: Oral    SpO2: 95%      Physical Exam:  General: Alert, No apparent distress, Cooperative  SKIN: dry, warm, no rash or lesions seen in areas of exposed skin  HENT: HEAD: ATNC,   EYES: Conjunctiva clear, EOMI  NOSE: Nares Patent.  THROAT: No Posterior oropharynx erythema  NECK: Supple, non tender, no cervical adenopathy  LUNGS: No respiratory distress, retraction or flaring. clear lung sounds in all fields, no wheezes, rales, crackles or rhonchi   CV: RRR, no murmurs, rubs or gallops, no cyanosis  Abdomen: No CVA tenderness, soft, nontender, no rebound or guarding    Labs:  Recent Results (from the past 72 hour(s))   COVID-19 Virus PCR MRF    Specimen: Respiratory   Result Value Ref Range    COVID-19 VIRUS SPECIMEN SOURCE Nasopharyngeal     2019-nCOV       Test received-See reflex to IDDL test SARS CoV2 (COVID-19) Virus RT-PCR   SARS-CoV-2 (COVID-19)-PCR    Specimen: Respiratory   Result Value Ref Range    SARS-CoV-2 Virus Specimen Source Nasopharyngeal     SARS-CoV-2 PCR Result NEGATIVE     SARS-COV-2 PCR COMMENT       Testing was performed using the Aptima SARS-CoV-2 Assay on the [employer]   Urinalysis-UC if Indicated   Result Value Ref Range    Color, UA Brown (!) Colorless, Yellow, Straw, Light Yellow    Clarity, UA Slightly Cloudy (!) Clear    Glucose, UA Negative Negative    Protein, UA >=300 mg/dL (!) Negative    Bilirubin, UA Small (!) Negative    Urobilinogen, UA 0.2 E.U./dL 0.2 E.U./dL, 1.0 E.U./dL     pH, UA 5.5 5.0 - 8.0    Blood, UA Large (!) Negative    Ketones, UA Negative Negative    Nitrite, UA Negative Negative    Leukocytes, UA Negative Negative    Specific Gravity, UA >=1.030 1.005 - 1.030    RBC, UA >100 (!) None Seen, 0-2 hpf    WBC, UA 0-5 None Seen, 0-5 hpf    Bacteria, UA Many (!) None Seen    Squam Epithel, UA 0-5 None Seen, 0-5 lpf    Amorphous, UA Moderate (!) None Seen   Culture, Urine    Specimen: Urine   Result Value Ref Range    Culture No Growth    Comprehensive Metabolic Panel   Result Value Ref Range    Sodium 138 136 - 145 mmol/L    Potassium 4.4 3.5 - 5.0 mmol/L    Chloride 102 98 - 107 mmol/L    CO2 22 22 - 31 mmol/L    Anion Gap, Calculation 14 5 - 18 mmol/L    Glucose 95 70 - 125 mg/dL    BUN 20 8 - 22 mg/dL    Creatinine 1.21 0.70 - 1.30 mg/dL    GFR MDRD Af Amer >60 >60 mL/min/1.73m2    GFR MDRD Non Af Amer >60 >60 mL/min/1.73m2    Bilirubin, Total 1.0 0.0 - 1.0 mg/dL    Calcium 9.2 8.5 - 10.5 mg/dL    Protein, Total 7.3 6.0 - 8.0 g/dL    Albumin 3.8 3.5 - 5.0 g/dL    Alkaline Phosphatase 65 45 - 120 U/L    AST 25 0 - 40 U/L    ALT 26 0 - 45 U/L   HM1 (CBC with Diff)   Result Value Ref Range    WBC 5.2 4.0 - 11.0 thou/uL    RBC 5.73 4.40 - 6.20 mill/uL    Hemoglobin 16.5 14.0 - 18.0 g/dL    Hematocrit 49.3 40.0 - 54.0 %    MCV 86 80 - 100 fL    MCH 28.8 27.0 - 34.0 pg    MCHC 33.5 32.0 - 36.0 g/dL    RDW 13.0 11.0 - 14.5 %    Platelets 160 140 - 440 thou/uL    MPV 8.8 7.0 - 10.0 fL    Neutrophils % 66 50 - 70 %    Lymphocytes % 18 (L) 20 - 40 %    Monocytes % 12 (H) 2 - 10 %    Eosinophils % 3 0 - 6 %    Basophils % 1 0 - 2 %    Immature Granulocyte % 1 (H) <=0 %    Neutrophils Absolute 3.4 2.0 - 7.7 thou/uL    Lymphocytes Absolute 0.9 0.8 - 4.4 thou/uL    Monocytes Absolute 0.6 0.0 - 0.9 thou/uL    Eosinophils Absolute 0.2 0.0 - 0.4 thou/uL    Basophils Absolute 0.0 0.0 - 0.2 thou/uL    Immature Granulocyte Absolute 0.0 <=0.0 thou/uL   Rapid Strep A Screen-Throat swab     Specimen: Throat   Result Value Ref Range    Rapid Strep A Antigen No Group A Strep detected, presumptive negative No Group A Strep detected, presumptive negative   Group A Strep PCR Throat Swab    Specimen: Throat   Result Value Ref Range    Group Strep A by PCR No Group A Strep detected No Group A Strep detected, Invalid, ERROR       Radiology:  Chest x-ray personal rotation: No acute cardiopulmonary abnormalities noted  Xr Chest 2 Views    Result Date: 5/24/2021  EXAM: XR CHEST 2 VIEWS LOCATION: [facility] DATE/TIME: 5/24/2021 12:37 PM INDICATION: cough, tachycardia COMPARISON: 11/09/2020     Paraspinal osteophytes. Heart size appears normal. Lungs appear clear.      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  2019-12: Moderate protein-calorie malnutrition (H)  2019-12: GALINDO (acute kidney injury) (H)  2019-12: Acute kidney injury (H)  2019-12: Nausea and vomiting, intractability of vomiting not   specified, unspecified vomiting type  2019-12: Diverticulitis  2019-11: Non-recurrent unilateral inguinal hernia without obstruction   or gangrene  2019-09: History of Bell's palsy  2019-09: Hyperbilirubinemia  2019-09: Acute diverticulitis  2018-06: Colon polyp  2015-04: Insomnia  2015-04: Insomnia, unspecified  Smokeless tobacco use  Hyperlipidemia LDL goal <100  Benign Prostatic Hypertrophy  Cerumen Impaction  Lower Back Pain  Diverticulosis  Hematuria  Obstructive Sleep Apnea  Benign Essential Hypertension  Severe malnutrition (H)  Abdominal cramping      Past Medical History:   Diagnosis Date     GALINDO (acute kidney injury) (H)      BPH (benign prostatic hyperplasia)      Clostridium difficile colitis     h/o 10/19     Diverticulitis      GERD (gastroesophageal reflux disease)      H/O Bell's palsy      Hematuria      Hyperbilirubinemia       Hyperlipidemia      Hypertension      Insomnia      Lower back pain      Right inguinal hernia      Sleep apnea     Minor Sleep Apnea not CPAP       Current Outpatient Medications on File Prior to Visit   Medication Sig Dispense Refill     amLODIPine (NORVASC) 10 MG tablet Take 0.5 tablets (5 mg total) by mouth daily. 90 tablet 3     escitalopram oxalate (LEXAPRO) 10 MG tablet Take 1 tablet (10 mg total) by mouth daily. 30 tablet 2     medical cannabis (Patient's own supply) by Other route see administration instructions. (The purpose of this order is to document that the patient reports taking medical cannabis. This is not a prescription, and is not used to certify that the patient has a  qualifying medical condition.)       multivitamin (ONE A DAY) per tablet Take 1 tablet by mouth daily.       pravastatin (PRAVACHOL) 20 MG tablet Take 1 tablet (20 mg total) by mouth every evening. 30 tablet 11     zolpidem (AMBIEN) 10 mg tablet Take 0.5-1 tablets (5-10 mg total) by mouth at bedtime as needed for sleep. 30 tablet 2     No current facility-administered medications on file prior to visit.         Social History     Tobacco Use     Smoking status: Former Smoker     Types: Cigarettes     Quit date: 1990     Years since quittin.4     Smokeless tobacco: Former User     Types: Chew     Tobacco comment: chew daily   Substance Use Topics     Alcohol use: Yes     Comment: occasional       The plan of care was discussed with the patient. They understand and agree with the course of treatment prescribed. A printed summary was given including instructions and medications.  The use of Dragon/Photetica dictation services may have been used to construct the content in this note; any grammatical or spelling errors are non-intentional. Please contact the author of this note directly if you are in need of any clarification.

## 2021-06-18 NOTE — PATIENT INSTRUCTIONS - HE
Patient Instructions by Rivka Hubbard CNP at 10/6/2020  7:30 AM     Author: Rivka Hubbard CNP Service: -- Author Type: Nurse Practitioner    Filed: 10/6/2020  8:14 AM Encounter Date: 10/6/2020 Status: Signed    : Rivka Hubbard CNP (Nurse Practitioner)         Patient Education     Your Health Risk Assessment indicates you feel you are not in good physical health.    A healthy lifestyle helps keep the body fit and the mind alert. It helps protect you from disease, helps you fight disease, and helps prevent chronic disease (disease that doesn't go away) from getting worse. This is important as you get older and begin to notice twinges in muscles and joints and a decline in the strength and stamina you once took for granted. A healthy lifestyle includes good healthcare, good nutrition, weight control, recreation, and regular exercise. Avoid harmful substances and do what you can to keep safe. Another part of a healthy lifestyle is stay mentally active and socially involved.    Good healthcare     Have a wellness visit every year.     If you have new symptoms, let us know right away. Don't wait until the next checkup.     Take medicines exactly as prescribed and keep your medicines in a safe place. Tell us if your medicine causes problems.   Healthy diet and weight control     Eat 3 or 4 small, nutritious, low-fat, high-fiber meals a day. Include a variety of fruits, vegetables, and whole-grain foods.     Make sure you get enough calcium in your diet. Calcium, vitamin D, and exercise help prevent osteoporosis (bone thinning).     If you live alone, try eating with others when you can. That way you get a good meal and have company while you eat it.     Try to keep a healthy weight. If you eat more calories than your body uses for energy, it will be stored as fat and you will gain weight.     Recreation   Recreation is not limited to sports and team events. It includes any activity that provides relaxation,  interest, enjoyment, and exercise. Recreation provides an outlet for physical, mental, and social energy. It can give a sense of worth and achievement. It can help you stay healthy.       Patient Education   Understanding USDA MyPlate  The USDA ([place] Department of Agriculture) has guidelines to help you make healthy food choices. These are called MyPlate. MyPlate shows the food groups that make up healthy meals using the image of a place setting. Before you eat, think about the healthiest choices for what to put onto your plate or into your cup or bowl. To learn more about building a healthy plate, visit www.choosemyplate.gov.       The Food Groups    Fruits: Any fruit or 100% fruit juice counts as part of the Fruit Group. Fruits may be fresh, canned, frozen, or dried, and may be whole, cut-up, or pureed. Make half your plate fruits and vegetables.    Vegetables: Any vegetable or 100% vegetable juice counts as a member of the Vegetable Group. Vegetables may be fresh, frozen, canned, or dried. They can be served raw or cooked and may be whole, cut-up, or mashed. Make half your plate fruits and vegetables.     Grains: All foods made from grains are part of the Grains Group. These include wheat, rice, oats, cornmeal, and barley such as bread, pasta, oatmeal, cereal, tortillas, and grits. Grains should be no more than a quarter of your plate. At least half of your grains should be whole grains.    Protein: This group includes meat, poultry, seafood, beans and peas, eggs, processed soy products (like tofu), nuts (including nut butters), and seeds. Make protein choices no more than a quarter of your plate. Meat and poultry choices should be lean or low fat.    Dairy: All fluid milk products and foods made from milk that contain calcium, like yogurt and cheese are part of the Dairy Group. (Foods that have little calcium, such as cream, butter, and cream cheese, are not part of the group.) Most dairy choices should be low-fat  or fat-free.    Oils: These are fats that are liquid at room temperature. They include canola, corn, olive, soybean, and sunflower oil. Foods that are mainly oil include mayonnaise, certain salad dressings, and soft margarines. You should have only 5 to 7 teaspoons of oils a day. You probably already get this much from the food you eat.  Use [employer] to Help Build Your Meals  The SuperTracker can help you plan and track your meals and activity. You can look up individual foods to see or compare their nutritional value. You can get guidelines for what and how much you should eat. You can compare your food choices. And you can assess personal physical activities and see ways you can improve. Go to www.[employer].gov/[employer] 360cker/.    6213-6415 [employer]. [address] [place] [address]. All rights reserved. This information is not intended as a substitute for professional medical care. Always follow your healthcare professional's instructions.             Advance Directive  Patients advance directive was discussed and I am comfortable with the patients wishes.  Patient Education   Personalized Prevention Plan  You are due for the preventive services outlined below.  Your care team is available to assist you in scheduling these services.  If you have already completed any of these items, please share that information with your care team to update in your medical record.  Health Maintenance   Topic Date Due   ? HIV SCREENING  06/05/1970   ? TD 18+ HE  04/28/2021   ? MEDICARE ANNUAL WELLNESS VISIT  10/06/2021   ? LIPID  09/26/2024   ? ADVANCE CARE PLANNING  09/26/2024   ? COLORECTAL CANCER SCREENING  05/29/2028   ? HEPATITIS C SCREENING  Completed   ? INFLUENZA VACCINE RULE BASED  Completed   ? TDAP ADULT ONE TIME DOSE  Completed   ? ZOSTER VACCINES  Completed   ? Pneumococcal Vaccine: Pediatrics (0 to 5 Years) and At-Risk Patients (6 to 64 Years)  Aged Out   ? HEPATITIS B VACCINES  Aged  Out

## 2021-06-19 NOTE — PROGRESS NOTES
Subjective:   Jay presents because of left ear pain.  He has been doing a lot of swimming for exercise.  He has been trying to use peroxide to the ear to try to keep it clean but he has been having more pain.  Right ear seems worse than the left today.  He gets a little bit of drainage out of the ear canals.  He denies nasal congestion.  He denies any fevers, sweats or chills.  He has no sore throat.      Objective:  HEENT: Both ear canals are slightly swollen.  They are definitely tender to palpation and pressure upon inspection.  Minimal exudate noted however.  Pressure on the tragus exacerbates pain bilaterally.  Sinuses nontender.  Conjunctivae clear.  Nasal mucosa minimally inflamed.  Pharynx is clear.  Neck: No lymphadenopathy present.  Lungs: Lungs are clear bilaterally.      Assessment:  1.  Bilateral otitis externa      Plan:  Start Augmentin 875 mg twice daily.  Continue peroxide as needed.  Follow-up here as needed.  Use Tylenol or ibuprofen for pain control.  He will try to keep the ears as dry as possible if he wants to continue swimming.

## 2021-06-20 NOTE — PROGRESS NOTES
Assessment:      Healthy male exam.   Hyperlipidemia  Hypertension  Benign prostatic hypertrophy  History of colon polyps  Diverticulosis  Chronic low back pain  Degenerative joint disease knees     Plan:       Routine lab work will be done today and patient will be called with any abnormalities.  I encouraged him to continue a good aerobic exercise program.  He will start a weight loss diet as well.  Shingrix is given today.  He is up-to-date on all other medications.  Amlodipine and atorvastatin are refilled.     Subjective:      Jay Roth is a 63 y.o. male who presents for an annual exam. The patient reports that there is not domestic violence in his life.  Overall, he is feeling fairly well.  He does have chronic low back pain.  He sees the pain clinic for that.  He usually swims almost daily.  Recently he had to give this up for a while because of an ear infection.  He has gained weight over the last month because of that.  He also has had some knee stiffness and pain.  He does see the orthopedic surgeon.  He states he awakens twice nightly to void.  He denies dysuria.    Healthy Habits:   Regular Exercise: Yes  Sunscreen Use: Yes  Healthy Diet: Yes  Dental Visits Regularly: Yes  Seat Belt: Yes  Sexually active: Yes  Monthly Self Testicular Exams:  No  Hemoccults: No  Flex Sig: No  Colonoscopy: Yes  Lipid Profile: Yes  Glucose Screen: Yes      Immunization History   Administered Date(s) Administered     Hep A, historic 04/22/2010, 04/28/2011     Td,adult,historic,unspecified 07/02/2006     Tdap 04/28/2011     ZOSTER, LIVE 05/04/2016     ZOSTER, RECOMBINANT, IM 09/24/2018     Immunization status: up to date and documented.    No exam data present     Current Outpatient Prescriptions   Medication Sig Dispense Refill     amLODIPine (NORVASC) 10 MG tablet Take 1 tablet (10 mg total) by mouth daily. 90 tablet 3     atorvastatin (LIPITOR) 20 MG tablet Take 1 tablet (20 mg total) by mouth daily. 90 tablet 3      betamethasone dipropionate (DIPROLENE) 0.05 % ointment APPLY SPARINGLY EXTERNALLY TO THE AFFECTED AREA TWICE DAILY 30 g 0     celecoxib (CELEBREX) 200 MG capsule TK 1 C PO BID  2     ketoconazole (NIZORAL) 2 % shampoo   0     oxyCODONE-acetaminophen (PERCOCET) 5-325 mg per tablet TK 1 TO 2 TS PO Q FOUR H PRN Pain 30 tablet 0     traZODone (DESYREL) 100 MG tablet Take 1 tablet (100 mg total) by mouth bedtime. 90 tablet 0     No current facility-administered medications for this visit.      No past medical history on file.  Past Surgical History:   Procedure Laterality Date     AL KNEE SCOPE,MED OR LAT MENIS REPAIR      Description: Knee Arthroscopy With Medial Meniscus Repair;  Recorded: 02/11/2009;     Review of patient's allergies indicates no known allergies.  No family history on file.  Social History     Social History     Marital status:      Spouse name: N/A     Number of children: N/A     Years of education: N/A     Occupational History     Not on file.     Social History Main Topics     Smoking status: Former Smoker     Types: Cigarettes     Quit date: 1/1/1990     Smokeless tobacco: Current User     Types: Chew     Alcohol use Yes     Drug use: No     Sexual activity: Yes     Partners: Female     Other Topics Concern     Not on file     Social History Narrative       Review of Systems  Review of Systems   Constitutional: Negative.  Negative for fatigue and fever.   HENT: Negative.  Negative for congestion.    Eyes: Negative.    Respiratory: Negative.    Cardiovascular: Negative.  Negative for chest pain.   Gastrointestinal: Negative.  Negative for constipation and diarrhea.   Endocrine: Negative.    Genitourinary:        Awakens twice nightly to void.   Musculoskeletal:        Complains of low back pain.  This is chronic.  Complains of knees getting stiff easily.   Skin: Negative.    Allergic/Immunologic: Negative.    Neurological: Negative.    Hematological: Negative.    Psychiatric/Behavioral:  "Negative.              Objective:     Vitals:    09/24/18 1228   BP: 130/86   Pulse: (!) 110   Resp: 16   Temp: 98.1  F (36.7  C)   TempSrc: Oral   SpO2: 97%   Weight: (!) 242 lb (109.8 kg)   Height: 5' 10\" (1.778 m)     Body mass index is 34.72 kg/(m^2).    Physical  Physical Exam   Constitutional: He is oriented to person, place, and time. He appears well-developed and well-nourished. No distress.   HENT:   Right Ear: External ear normal.   Left Ear: External ear normal.   Mouth/Throat: Oropharynx is clear and moist.   Eyes: EOM are normal. Pupils are equal, round, and reactive to light.   Neck: Normal range of motion. Neck supple. No JVD present. No thyromegaly present.   Cardiovascular: Normal rate, regular rhythm and normal heart sounds.    No murmur heard.  Pulmonary/Chest: Effort normal and breath sounds normal. No respiratory distress.   Abdominal: Soft. Bowel sounds are normal. He exhibits no mass. There is no tenderness.   Genitourinary: Penis normal.   Genitourinary Comments: Prostate is symmetrically sized but enlarged.  No nodularity or tenderness present.   Musculoskeletal:   Low back has palpable tenderness in the right sacroiliac area.  Lumbar musculature is minimally spasm.  Straight leg raising is negative today.  Knees have crepitus bilaterally.  No effusions noted.  No instability noted to maneuvers today.   Lymphadenopathy:     He has no cervical adenopathy.   Neurological: He is alert and oriented to person, place, and time. He has normal reflexes. No cranial nerve deficit.   Skin: Skin is warm and dry. No rash noted.   Psychiatric: He has a normal mood and affect.              "

## 2021-06-20 NOTE — PROGRESS NOTES
Assessment/Plan:        Diagnoses and all orders for this visit:    Follow up  Otitis media, resolved  Otitis externa, right  I do suspect otits externa with recent swimming,  Improved   Ear flush and curette completed today without perforated tympanic membrane.   I will prescribe ear drops to assist with symptoms - He will also seen ENT for evaluation of continued right ear symptoms.   Discussed side effects of medications and proper use. Patient verbalized understanding.  Reviewed home supportive care with swimming and ear concerns  Close follow up with PCP if persistent worsening symptoms and for BP check  Patient agreed and appeared pleased with plan        Ear Cerumen Removal  Date/Time: 8/23/2018 10:27 AM  Performed by: SADA TOPETE  Authorized by: SADA TOPETE     Anesthesia:  Local Anesthetic: none  Location details: right ear  Procedure type: curette  Procedure type comments: and irrigation completed today    Sedation:  Patient sedated: no  Patient tolerance: Patient tolerated the procedure well with no immediate complications          Subjective:    Patient ID: Jay Roth is a 63 y.o. male.    Ear Fullness    There is pain in both ears. This is a chronic problem. The current episode started 1 to 4 weeks ago (was seen 8/14/2018 and started on Augmentin, he took full course of antibiotics and presents for a follow up, continued fullness). The problem has been gradually improving. There has been no fever. The patient is experiencing no pain (feeling fullness). Pertinent negatives include no abdominal pain, coughing, diarrhea, ear discharge, headaches, hearing loss, neck pain, rash, rhinorrhea, sore throat or vomiting. He has tried antibiotics for the symptoms. The treatment provided mild relief. There is no history of a chronic ear infection, hearing loss or a tympanostomy tube.   Endorses swimming recently.  Hydrogen peroxide, vinegar    The following portions of the patient's history were  reviewed and updated as appropriate: allergies, current medications, past family history, past medical history, past social history, past surgical history and problem list.    Review of Systems   HENT: Negative for ear discharge, hearing loss, rhinorrhea and sore throat.    Respiratory: Negative for cough.    Gastrointestinal: Negative for abdominal pain, diarrhea and vomiting.   Musculoskeletal: Negative for neck pain.   Skin: Negative for rash.   Neurological: Negative for headaches.             Objective:    Physical Exam   Constitutional: He is oriented to person, place, and time. He appears well-developed and well-nourished. No distress.   HENT:   Right Ear: External ear normal.   Left Ear: External ear normal.   Nose: Nose normal.   Mouth/Throat: No oropharyngeal exudate.   Left with clear external and visible TMs and all landmarks.   Right TM and landmarks not visible in right ear, white thick drainage.    Eyes: Conjunctivae are normal. Right eye exhibits no discharge. Left eye exhibits no discharge.   Cardiovascular: Normal rate and normal heart sounds.    No murmur heard.  Pulmonary/Chest: Effort normal and breath sounds normal. No respiratory distress.   Lymphadenopathy:     He has no cervical adenopathy.   Neurological: He is alert and oriented to person, place, and time.   Skin: He is not diaphoretic.   Psychiatric: He has a normal mood and affect. His behavior is normal. Judgment normal.             Vitals:    08/23/18 0942   BP: 152/88   Patient Site: Left Arm   Patient Position: Sitting   Cuff Size: Adult Regular   Pulse: 84   Weight: (!) 239 lb 8 oz (108.6 kg)       Current Outpatient Prescriptions   Medication Sig Dispense Refill     amLODIPine (NORVASC) 10 MG tablet TAKE 1 TABLET BY MOUTH DAILY 90 tablet 3     atorvastatin (LIPITOR) 20 MG tablet TAKE 1 TABLET BY MOUTH DAILY 90 tablet 3     betamethasone dipropionate (DIPROLENE) 0.05 % ointment APPLY SPARINGLY EXTERNALLY TO THE AFFECTED AREA TWICE  DAILY 30 g 0     celecoxib (CELEBREX) 200 MG capsule TK 1 C PO BID  2     oxyCODONE-acetaminophen (PERCOCET) 5-325 mg per tablet TK 1 TO 2 TS PO Q FOUR H PRN Pain 30 tablet 0     amoxicillin-clavulanate (AUGMENTIN) 875-125 mg per tablet Take 1 tablet by mouth 2 (two) times a day for 10 days. 20 tablet 1     ketoconazole (NIZORAL) 2 % shampoo   0     traZODone (DESYREL) 100 MG tablet Take 1 tablet (100 mg total) by mouth bedtime. 90 tablet 0     No current facility-administered medications for this visit.

## 2021-06-21 NOTE — LETTER
Letter by Herman Lundberg MD at      Author: Herman Lundberg MD Service: -- Author Type: --    Filed:  Encounter Date: 2/3/2021 Status: (Other)         Jay CRUZ [address]ra[address]  [address] [place] [id number]      February 3, 2021      Dear Jay,    This letter is to remind you that you will be due for your follow up appointment with Dr. Herman Lundberg in February, 2021. To help ensure you are in the best health possible, a regular follow-up with your cardiologist is essential.     Please call our Patient Scheduling Line at [phone] to schedule your appointment at your earliest convenience.  If you have recently scheduled an appointment, please disregard this letter.    We look forward to seeing you again. As always, we are available at the number  above for any questions or concerns you may have.      Sincerely,     The Physicians and Staff of [facility]

## 2021-06-24 NOTE — TELEPHONE ENCOUNTER
Left voice message:  Clinic staff is reaching out to remind you that Dr. De La Cruz has recently retired from practice at the [facility].    If you would like to establish care with a provider at [facility], it would be our pleasure to assist you.    Please call [facility] at [phone] at your earliest convenience with questions or to schedule an (Establish Care) appointment.    Thank you.

## 2021-06-26 ENCOUNTER — HEALTH MAINTENANCE LETTER (OUTPATIENT)
Age: 66
End: 2021-06-26

## 2021-06-29 NOTE — PROGRESS NOTES
Progress Notes by Herman Lundberg MD at 11/11/2020 10:50 AM     Author: Herman Lundberg MD Service: -- Author Type: Physician    Filed: 11/11/2020 11:22 AM Encounter Date: 11/11/2020 Status: Signed    : Herman Lundberg MD (Physician)           Click to link to [facility] NOTE    Thank you, Dr. Moreira, for asking me to see Jay Roth in consultation at [facility] to evaluate chest pain.      Assessment/Plan:   1.  Intermittent chest pain: The patient developed vague symptoms including tight chest pain, pleuritic chest pain, mild dyspnea on exertion, mild lightheadedness.  His ER evaluation is not impressive.  His ECG did not show ischemic ST-T change, no conduction abnormality.  His troponins were normal.  BNP was normal.  He was ruled out pulmonary embolism and aortic aneurysm.  The patient does have a few factors including his age, hypertension, dyslipidemia and obesity.  He is not able to do exercise treadmill.  Pharmacological Regadenonson nuclear stress test is requested for further evaluation.    2.  Essential hypertension: Continue amlodipine 10 mg daily.    3.  Dyslipidemia: Continue Lipitor 20 mg at bedtime.    4.  Obesity: Recommended lifestyle modification.    We will follow-up for his nuclear stress test the report and discussed the findings with the patient.  Thank you for the opportunity to be involved in the care of Jay Roth. If you have any questions, please feel free to contact me.  I will see the patient again in 2 months.    Much or all of the text in this note was generated through the use of Dragon Dictate voice-to-text software. Errors in spelling or words which seem out of context are unintentional.   Sound alike errors, in particular, may have escaped editing.       History of Present Illness:   It is my pleasure to see Jay Roth at the [facility] for evaluation of Chest Pain. Jay Roth is a 65 y.o. male with a  medical history of essential hypertension, dyslipidemia, BPH, low back pain, obesity with BMI 37.5 and 30 years of smoking history.    The patient presents to rapid access clinic for evaluation of chest pain.  He described that he developed intermittent tight chest pain over last month.  He has 2 different types of chest pain, tight chest pain located anterior chest, mild in severity, also paretic her chest pain in left lower chest, both types of her chest pain lasted in variable duration, no radiation.  It is not associated with exertion.  He complains of mild dyspnea on exertion.  Unfortunately, he gained almost 50 pounds since March 2020.  He is not able to do much exercise activities due to low back pain.  He has no palpitations, orthopnea, PND or leg edema.  He has occasional lightheadedness, but no syncope.  He restarted antihypertensive medication amlodipine 10 mg daily, antilipidemic medication atorvastatin 20 mg at bedtime recently.      He had ER visit 2 days ago.  His ECG showed a sinus arrhythmia, no ischemic ST-T change, troponin and BNP are normal.  Chest CTA has no pulmonary embolism or aortic aneurysm.    Past Medical History:     Patient Active Problem List   Diagnosis   ? Smokeless tobacco use   ? Hyperlipidemia LDL goal <100   ? Benign Prostatic Hypertrophy   ? Cerumen Impaction   ? Lower Back Pain   ? Diverticulosis   ? Hematuria   ? Obstructive Sleep Apnea   ? Benign Essential Hypertension   ? Insomnia   ? Colon polyp   ? History of Bell's palsy   ? Hyperbilirubinemia   ? Acute diverticulitis   ? Non-recurrent unilateral inguinal hernia without obstruction or gangrene   ? Diverticulitis   ? GALINDO (acute kidney injury) (H)   ? Acute kidney injury (H)   ? Nausea and vomiting, intractability of vomiting not specified, unspecified vomiting type   ? Moderate protein-calorie malnutrition (H)   ? Recurrent Clostridioides difficile diarrhea   ? Severe malnutrition (H)   ? Abdominal cramping   ? Emesis   ?  Severe protein-calorie malnutrition (H)   ? Obesity (BMI 35.0-39.9) with comorbidity (H)       Past Surgical History:     Past Surgical History:   Procedure Laterality Date   ? HERNIA REPAIR     ? DE ESOPHAGOGASTRODUODENOSCOPY TRANSORAL DIAGNOSTIC N/A 12/26/2019    Procedure: ESOPHAGOGASTRODUODENOSCOPY (EGD) with biopsies;  Surgeon: Tamir Serna MD;  Location: [facility];  Service: Gastroenterology   ? DE KNEE SCOPE,MED OR LAT MENIS REPAIR      Description: Knee Arthroscopy With Medial Meniscus Repair;  Recorded: 02/11/2009;   ? DE LAP,SURG,COLECTOMY, PARTIAL, W/ANAST N/A 12/5/2019    Procedure: LAPAROSCOPIC sIGMOIDECTOMY;  Surgeon: Isi Lyles MD;  Location: [facility] OR;  Service: General   ? SIGMOIDOSCOPY N/A 12/24/2019    Procedure: Flexible SIGMOIDOSCOPY;  Surgeon: Isi Lyles MD;  Location: [facility];  Service: Gastroenterology   ? SIGMOIDOSCOPY N/A 12/27/2019    Procedure: Flexible SIGMOIDOSCOPY with biopsy;  Surgeon: Isi Lyles MD;  Location: [facility];  Service: Gastroenterology       Family History:   Reviewed no family history of coronary artery disease.    Social History:    reports that he quit smoking about 30 years ago. His smoking use included cigarettes. He has quit using smokeless tobacco.  His smokeless tobacco use included chew. He reports current alcohol use. He reports current drug use.    Review of Systems:   General: Weight Gain  Eyes: WNL  Ears/Nose/Throat: WNL  Lungs: WNL  Heart: Chest Pain, Arm Pain, Shortness of Breath with activity, Leg Swelling  Stomach: WNL  Bladder: WNL  Muscle/Joints: WNL  Skin: WNL  Nervous System: Daytime Sleepiness  Mental Health: WNL     Blood: WNL    Meds:     Current Outpatient Medications:   ?  amLODIPine (NORVASC) 10 MG tablet, Take 0.5 tablets (5 mg total) by mouth daily., Disp: 90 tablet, Rfl: 3  ?  atorvastatin (LIPITOR) 20 MG tablet, Take 1 tablet (20 mg total) by mouth daily., Disp: 90 tablet, Rfl: 1  ?   "dicyclomine (BENTYL) 10 MG capsule, Take 1 capsule by mouth 3 (three) times a day as needed., Disp: , Rfl:   ?  medical cannabis (Patient's own supply), by Other route see administration instructions. (The purpose of this order is to document that the patient reports taking medical cannabis. This is not a prescription, and is not used to certify that the patient has a  qualifying medical condition.), Disp: , Rfl:   ?  multivitamin (ONE A DAY) per tablet, Take 1 tablet by mouth daily., Disp: , Rfl:   ?  sulfaSALAzine (AZULFIDINE) 500 mg tablet, Take 2 tablets by mouth 2 (two) times a day., Disp: , Rfl:   ?  zolpidem (AMBIEN) 10 mg tablet, Take 0.5-1 tablets (5-10 mg total) by mouth at bedtime as needed for sleep., Disp: 30 tablet, Rfl: 2     Allergies:   Patient has no known allergies.    Objective:      Physical Exam  (!) 259 lb 3.2 oz (117.6 kg)  5' 9.75\" (1.772 m)  Body mass index is 37.46 kg/m .  /82 (Patient Site: Right Arm, Patient Position: Sitting, Cuff Size: Adult Large)   Pulse (!) 112   Resp 20   Ht 5' 9.75\" (1.772 m)   Wt (!) 259 lb 3.2 oz (117.6 kg)   BMI 37.46 kg/m      General Appearance:   Awake, Alert, No acute distress.   HEENT:  Pupil equal, reactive to light. No scleral icterus; the mucous membranes were moist. No oral ulcers or thrush.    Neck: No cervical bruits. No JVD. No thyromegaly. No lymph node enlargement or tenderness.   Chest: The spine was straight. The chest was symmetric.   Lungs:   Respirations unlabored. Lungs are clear to auscultation. No crackles. No wheezing.   Cardiovascular:   RRR, normal first and second heart sounds with no murmurs. No rubs or gallops.    Abdomen:  Obese. Soft. No tenderness. Non-distended. Bowels sounds are present   Extremities: Equal posterior tibial pulses. No leg edema.   Skin: No rashes or ulcers. Warm, Dry.   Musculoskeletal: No tenderness. No deformity.   Neurologic: Mood and affect are appropriate. No focal deficits.         EKG:  " Personally reivewed  Sinus rhythm with Premature atrial complexes   Otherwise normal ECG   When compared with ECG of 24-JAN-2020 17:38,   Premature atrial complexes are now Present    Lab Review   Lab Results   Component Value Date     11/09/2020    K 4.1 11/09/2020     11/09/2020    CO2 25 11/09/2020    BUN 21 11/09/2020    CREATININE 1.11 11/09/2020    CALCIUM 9.6 11/09/2020     Lab Results   Component Value Date    WBC 5.8 11/09/2020    WBC 6.2 06/03/2014    HGB 16.2 11/09/2020    HCT 46.7 11/09/2020    MCV 88 11/09/2020     11/09/2020     Lab Results   Component Value Date    CHOL 250 (H) 10/06/2020    TRIG 268 (H) 10/06/2020    HDL 49 10/06/2020     Lab Results   Component Value Date    TROPONINI <0.01 11/09/2020     Lab Results   Component Value Date    BNP 25 11/09/2020     Lab Results   Component Value Date    TSH 2.02 11/09/2020

## 2021-07-03 NOTE — ADDENDUM NOTE
Addendum Note by Anika Hubbard CNP at 10/9/2019  2:50 PM     Author: Anika Hubbard CNP Service: -- Author Type: Nurse Practitioner    Filed: 10/9/2019  8:17 PM Encounter Date: 10/9/2019 Status: Signed    : Anika Hubbard CNP (Nurse Practitioner)    Addended by: ANIKA HUBBARD on: 10/9/2019 08:17 PM        Modules accepted: Orders

## 2021-07-03 NOTE — ADDENDUM NOTE
Addendum Note by Estevan Lerma MD at 9/26/2019  9:40 AM     Author: Estevan Lerma MD Service: -- Author Type: Physician    Filed: 9/30/2019  6:01 PM Encounter Date: 9/26/2019 Status: Signed    : Estevan Lerma MD (Physician)    Addended by: ESTEVAN LERMA on: 9/30/2019 06:01 PM        Modules accepted: Orders

## 2021-07-06 VITALS
TEMPERATURE: 99.5 F | SYSTOLIC BLOOD PRESSURE: 130 MMHG | RESPIRATION RATE: 16 BRPM | DIASTOLIC BLOOD PRESSURE: 89 MMHG | OXYGEN SATURATION: 95 % | HEART RATE: 85 BPM

## 2021-07-29 DIAGNOSIS — I10 ESSENTIAL HYPERTENSION, BENIGN: ICD-10-CM

## 2021-07-29 RX ORDER — AMLODIPINE BESYLATE 10 MG/1
10 TABLET ORAL DAILY
Qty: 90 TABLET | Refills: 3 | Status: SHIPPED | OUTPATIENT
Start: 2021-07-29 | End: 2021-10-08

## 2021-07-29 NOTE — TELEPHONE ENCOUNTER
amLODIPine (NORVASC) 10 MG tablet [AMLODIPINE (NORVASC) 10 MG TABLET] Take 0.5 tablets (5 mg total) by mouth daily.     Patient stated that pharmacy was stating that he cant get anymore refills. Caller stated that he normally gets it as a yearly prescription and isnt sure to why now they are saying that. Caller is wondering if pcp can send some refills over.

## 2021-10-08 ENCOUNTER — OFFICE VISIT (OUTPATIENT)
Dept: FAMILY MEDICINE | Facility: CLINIC | Age: 66
End: 2021-10-08
Payer: COMMERCIAL

## 2021-10-08 VITALS
DIASTOLIC BLOOD PRESSURE: 86 MMHG | OXYGEN SATURATION: 94 % | HEIGHT: 69 IN | BODY MASS INDEX: 37.47 KG/M2 | SYSTOLIC BLOOD PRESSURE: 134 MMHG | HEART RATE: 67 BPM | WEIGHT: 253 LBS

## 2021-10-08 DIAGNOSIS — I10 ESSENTIAL HYPERTENSION, BENIGN: ICD-10-CM

## 2021-10-08 DIAGNOSIS — N40.1 BPH WITH URINARY OBSTRUCTION: ICD-10-CM

## 2021-10-08 DIAGNOSIS — Z11.4 ENCOUNTER FOR SCREENING FOR HIV: ICD-10-CM

## 2021-10-08 DIAGNOSIS — E78.5 HYPERLIPIDEMIA LDL GOAL <100: ICD-10-CM

## 2021-10-08 DIAGNOSIS — E66.812 CLASS 2 DRUG-INDUCED OBESITY WITHOUT SERIOUS COMORBIDITY WITH BODY MASS INDEX (BMI) OF 37.0 TO 37.9 IN ADULT: ICD-10-CM

## 2021-10-08 DIAGNOSIS — Z00.01 ENCOUNTER FOR GENERAL ADULT MEDICAL EXAMINATION WITH ABNORMAL FINDINGS: Primary | ICD-10-CM

## 2021-10-08 DIAGNOSIS — G47.33 OSA (OBSTRUCTIVE SLEEP APNEA): ICD-10-CM

## 2021-10-08 DIAGNOSIS — G89.4 PAIN SYNDROME, CHRONIC: ICD-10-CM

## 2021-10-08 DIAGNOSIS — D12.6 SERRATED ADENOMA OF COLON: ICD-10-CM

## 2021-10-08 DIAGNOSIS — N13.8 BPH WITH URINARY OBSTRUCTION: ICD-10-CM

## 2021-10-08 DIAGNOSIS — G47.00 INSOMNIA, UNSPECIFIED TYPE: ICD-10-CM

## 2021-10-08 DIAGNOSIS — R32 URINARY INCONTINENCE, UNSPECIFIED TYPE: ICD-10-CM

## 2021-10-08 DIAGNOSIS — E66.1 CLASS 2 DRUG-INDUCED OBESITY WITHOUT SERIOUS COMORBIDITY WITH BODY MASS INDEX (BMI) OF 37.0 TO 37.9 IN ADULT: ICD-10-CM

## 2021-10-08 DIAGNOSIS — M18.0 ARTHRITIS OF CARPOMETACARPAL (CMC) JOINT OF BOTH THUMBS: ICD-10-CM

## 2021-10-08 DIAGNOSIS — Z12.5 SCREENING FOR PROSTATE CANCER: ICD-10-CM

## 2021-10-08 LAB
ALBUMIN SERPL-MCNC: 4.1 G/DL (ref 3.5–5)
ALBUMIN UR-MCNC: 100 MG/DL
ALP SERPL-CCNC: 64 U/L (ref 45–120)
ALT SERPL W P-5'-P-CCNC: 26 U/L (ref 0–45)
ANION GAP SERPL CALCULATED.3IONS-SCNC: 13 MMOL/L (ref 5–18)
APPEARANCE UR: CLEAR
AST SERPL W P-5'-P-CCNC: 22 U/L (ref 0–40)
BACTERIA #/AREA URNS HPF: ABNORMAL /HPF
BILIRUB SERPL-MCNC: 0.7 MG/DL (ref 0–1)
BILIRUB UR QL STRIP: NEGATIVE
BUN SERPL-MCNC: 15 MG/DL (ref 8–22)
CALCIUM SERPL-MCNC: 9.7 MG/DL (ref 8.5–10.5)
CHLORIDE BLD-SCNC: 102 MMOL/L (ref 98–107)
CHOLEST SERPL-MCNC: 194 MG/DL
CO2 SERPL-SCNC: 26 MMOL/L (ref 22–31)
COLOR UR AUTO: YELLOW
CREAT SERPL-MCNC: 1.25 MG/DL (ref 0.7–1.3)
FASTING STATUS PATIENT QL REPORTED: YES
GFR SERPL CREATININE-BSD FRML MDRD: 60 ML/MIN/1.73M2
GLUCOSE BLD-MCNC: 95 MG/DL (ref 70–125)
GLUCOSE UR STRIP-MCNC: NEGATIVE MG/DL
HDLC SERPL-MCNC: 50 MG/DL
HGB UR QL STRIP: ABNORMAL
HIV 1+2 AB+HIV1 P24 AG SERPL QL IA: NEGATIVE
HYALINE CASTS #/AREA URNS LPF: ABNORMAL /LPF
KETONES UR STRIP-MCNC: NEGATIVE MG/DL
LDLC SERPL CALC-MCNC: 106 MG/DL
LEUKOCYTE ESTERASE UR QL STRIP: NEGATIVE
MUCOUS THREADS #/AREA URNS LPF: PRESENT /LPF
NITRATE UR QL: NEGATIVE
PH UR STRIP: 5.5 [PH] (ref 5–8)
POTASSIUM BLD-SCNC: 4.9 MMOL/L (ref 3.5–5)
PROT SERPL-MCNC: 7.4 G/DL (ref 6–8)
PSA SERPL-MCNC: 0.67 UG/L (ref 0–4.5)
RBC #/AREA URNS AUTO: ABNORMAL /HPF
SODIUM SERPL-SCNC: 141 MMOL/L (ref 136–145)
SP GR UR STRIP: >=1.03 (ref 1–1.03)
SQUAMOUS #/AREA URNS AUTO: ABNORMAL /LPF
TRIGL SERPL-MCNC: 192 MG/DL
UROBILINOGEN UR STRIP-ACNC: 0.2 E.U./DL
WBC #/AREA URNS AUTO: ABNORMAL /HPF

## 2021-10-08 PROCEDURE — 99213 OFFICE O/P EST LOW 20 MIN: CPT | Mod: 25 | Performed by: FAMILY MEDICINE

## 2021-10-08 PROCEDURE — 81001 URINALYSIS AUTO W/SCOPE: CPT | Performed by: FAMILY MEDICINE

## 2021-10-08 PROCEDURE — 87389 HIV-1 AG W/HIV-1&-2 AB AG IA: CPT | Performed by: FAMILY MEDICINE

## 2021-10-08 PROCEDURE — 80053 COMPREHEN METABOLIC PANEL: CPT | Performed by: FAMILY MEDICINE

## 2021-10-08 PROCEDURE — 99396 PREV VISIT EST AGE 40-64: CPT | Performed by: FAMILY MEDICINE

## 2021-10-08 PROCEDURE — G0103 PSA SCREENING: HCPCS | Performed by: FAMILY MEDICINE

## 2021-10-08 PROCEDURE — 80061 LIPID PANEL: CPT | Performed by: FAMILY MEDICINE

## 2021-10-08 PROCEDURE — 36415 COLL VENOUS BLD VENIPUNCTURE: CPT | Performed by: FAMILY MEDICINE

## 2021-10-08 RX ORDER — ZOLPIDEM TARTRATE 10 MG/1
5-10 TABLET ORAL
Qty: 30 TABLET | Refills: 2 | Status: SHIPPED | OUTPATIENT
Start: 2021-10-08 | End: 2022-10-13

## 2021-10-08 RX ORDER — TAMSULOSIN HYDROCHLORIDE 0.4 MG/1
0.4 CAPSULE ORAL DAILY
Qty: 90 CAPSULE | Refills: 3 | Status: SHIPPED | OUTPATIENT
Start: 2021-10-08 | End: 2022-01-24

## 2021-10-08 RX ORDER — AMLODIPINE BESYLATE 10 MG/1
5 TABLET ORAL DAILY
Qty: 45 TABLET | Refills: 3 | COMMUNITY
Start: 2021-10-08 | End: 2021-12-09

## 2021-10-08 ASSESSMENT — ANXIETY QUESTIONNAIRES
6. BECOMING EASILY ANNOYED OR IRRITABLE: NOT AT ALL
7. FEELING AFRAID AS IF SOMETHING AWFUL MIGHT HAPPEN: NOT AT ALL
3. WORRYING TOO MUCH ABOUT DIFFERENT THINGS: NOT AT ALL
2. NOT BEING ABLE TO STOP OR CONTROL WORRYING: NOT AT ALL
3. WORRYING TOO MUCH ABOUT DIFFERENT THINGS: NOT AT ALL
5. BEING SO RESTLESS THAT IT IS HARD TO SIT STILL: NOT AT ALL
7. FEELING AFRAID AS IF SOMETHING AWFUL MIGHT HAPPEN: NOT AT ALL
GAD7 TOTAL SCORE: 0
GAD7 TOTAL SCORE: 0
4. TROUBLE RELAXING: NOT AT ALL
GAD7 TOTAL SCORE: 0
4. TROUBLE RELAXING: NOT AT ALL
1. FEELING NERVOUS, ANXIOUS, OR ON EDGE: NOT AT ALL
1. FEELING NERVOUS, ANXIOUS, OR ON EDGE: NOT AT ALL
6. BECOMING EASILY ANNOYED OR IRRITABLE: NOT AT ALL
8. IF YOU CHECKED OFF ANY PROBLEMS, HOW DIFFICULT HAVE THESE MADE IT FOR YOU TO DO YOUR WORK, TAKE CARE OF THINGS AT HOME, OR GET ALONG WITH OTHER PEOPLE?: NOT DIFFICULT AT ALL
7. FEELING AFRAID AS IF SOMETHING AWFUL MIGHT HAPPEN: NOT AT ALL
2. NOT BEING ABLE TO STOP OR CONTROL WORRYING: NOT AT ALL
5. BEING SO RESTLESS THAT IT IS HARD TO SIT STILL: NOT AT ALL
GAD7 TOTAL SCORE: 0

## 2021-10-08 ASSESSMENT — PATIENT HEALTH QUESTIONNAIRE - PHQ9
SUM OF ALL RESPONSES TO PHQ QUESTIONS 1-9: 2
10. IF YOU CHECKED OFF ANY PROBLEMS, HOW DIFFICULT HAVE THESE PROBLEMS MADE IT FOR YOU TO DO YOUR WORK, TAKE CARE OF THINGS AT HOME, OR GET ALONG WITH OTHER PEOPLE: NOT DIFFICULT AT ALL
SUM OF ALL RESPONSES TO PHQ QUESTIONS 1-9: 2
SUM OF ALL RESPONSES TO PHQ QUESTIONS 1-9: 2

## 2021-10-08 ASSESSMENT — ACTIVITIES OF DAILY LIVING (ADL): CURRENT_FUNCTION: NO ASSISTANCE NEEDED

## 2021-10-08 ASSESSMENT — MIFFLIN-ST. JEOR: SCORE: 1931.94

## 2021-10-08 NOTE — PROGRESS NOTES
"  SUBJECTIVE:   Jay Roth is a 66 year old male who presents for Preventive Visit.      Annual wellness visit completed.  Risks associated with suboptimal diet concerns with urinary incontinence etc.  Urinary symptoms over past 6+ months of likely.  Describe leakage.  Some urinary urgency.  No fever.  Follows with Dr. Florian Villa every 6 months with history of colitis prior laparoscopic sigmoid colectomy noted 2019.  Using amlodipine 10 mg taking half tablet daily which was prior dose but was filled as 10 mg tablet earlier this year.  No side effects.  Pravastatin 20 mg at bedtime for lipid management.  Infrequent zolpidem used for insomnia.  Obstructive sleep apnea but does not tolerate CPAP.  Was on Escitalopram during the pandemic however doing better and not requiring any longer and denies history of longstanding depression or anxiety concerns.  Serrated adenoma plus tubular adenoma x2 on prior colonoscopy May 2018 we will repeat at 5-year interval.  Arthritis noted at base of thumbs bilateral with discomfort noted, worsening.  Comprehensive review of systems as above otherwise all negative.       - Lovely  3 children - 2 sons, 1 daughter  5 grandchildren  Quit smoking ~ 20 years  Quit 19 - prior chew 1 tin per 3 days  EtOH: occ  Mom -  84 kidney failure  Dad -  86 \"wore out\"  2 bros -   1 sis -   Surgeries: knee arthroscopy bilateral; left inguinal hernia; laparoscopic sigmoid colectomy 19  Radiofrequency ablation procedure x 3 for lower back pain  Hospitalizations: 19 through 2020 (3 total admissions required) due to GI concerns  Work: retired as beer distributor (L1-L4 degenerative back issues) - oxycodone if walk, etc.  Hobbies: swim 5x/week, fishing;      Patient has been advised of split billing requirements and indicates understanding: Yes   Are you in the first 12 months of your Medicare coverage?  No    Healthy Habits:     In general, how would " "you rate your overall health?  Fair    Frequency of exercise:  6-7 days/week    Duration of exercise:  45-60 minutes    Do you usually eat at least 4 servings of fruit and vegetables a day, include whole grains    & fiber and avoid regularly eating high fat or \"junk\" foods?  No    Taking medications regularly:  No    Medication side effects:  None    Ability to successfully perform activities of daily living:  No assistance needed    Home Safety:  Lack of grab bars in the bathroom    Hearing Impairment:  No hearing concerns    In the past 6 months, have you been bothered by leaking of urine? Yes    In general, how would you rate your overall mental or emotional health?  Good      PHQ-2 Total Score: 0    Do you feel safe in your environment? Yes    Have you ever done Advance Care Planning? (For example, a Health Directive, POLST, or a discussion with a medical provider or your loved ones about your wishes): Yes, advance care planning is on file.       Fall risk no falls       Cognitive Screening   1) Repeat 3 items (Leader, Season, Table)    2) Clock draw: NORMAL  3) 3 item recall: Recalls 3 objects  Results: 3 items recalled: COGNITIVE IMPAIRMENT LESS LIKELY    Mini-CogTM Copyright KUSHAL Root. Licensed by the author for use in [facility]; reprinted with permission (robin@[facility]). All rights reserved.      Do you have sleep apnea, excessive snoring or daytime drowsiness?: yes    Reviewed and updated as needed this visit by clinical staff  Tobacco  Allergies  Meds  Problems  Med Hx  Surg Hx  Fam Hx          Reviewed and updated as needed this visit by Provider  Tobacco  Allergies  Meds  Problems  Med Hx  Surg Hx  Fam Hx         Social History     Tobacco Use     Smoking status: Former Smoker     Types: Cigarettes     Quit date: 1990     Years since quittin.7     Smokeless tobacco: Former User     Types: Chew     Tobacco comment: chew daily   Substance Use Topics     Alcohol use: Yes "     Comment: Alcoholic Drinks/day: occasional     If you drink alcohol do you typically have >3 drinks per day or >7 drinks per week? No    Alcohol Use 10/8/2021   Prescreen: >3 drinks/day or >7 drinks/week? No   Prescreen: >3 drinks/day or >7 drinks/week? -               Current providers sharing in care for this patient include:   Patient Care Team:  Aravind Villa MD as PCP - General (Family Practice)  Aravind Villa MD as Assigned PCP  Herman Lundberg MD as Assigned Heart and Vascular Provider    The following health maintenance items are reviewed in Epic and correct as of today:  Health Maintenance Due   Topic Date Due     ANNUAL REVIEW OF HM ORDERS  Never done     HIV SCREENING  Never done     DTAP/TDAP/TD IMMUNIZATION (2 - Td or Tdap) 04/28/2021     PHQ-9  06/08/2021     INFLUENZA VACCINE (1) 09/01/2021     Lab work is in process  Labs reviewed in EPIC  BP Readings from Last 3 Encounters:   10/08/21 134/86   05/24/21 130/89   11/11/20 138/82    Wt Readings from Last 3 Encounters:   10/08/21 114.8 kg (253 lb)   11/11/20 117.6 kg (259 lb 3.2 oz)   10/06/20 112.7 kg (248 lb 8 oz)                  Patient Active Problem List   Diagnosis     Smokeless tobacco use     Hyperlipidemia LDL goal <100     Benign Prostatic Hypertrophy     Cerumen Impaction     Lower Back Pain     Diverticulosis     Hematuria     JIGNA (obstructive sleep apnea)     Benign Essential Hypertension     Insomnia     Colon polyp     History of Bell's palsy     Hyperbilirubinemia     Acute diverticulitis     Non-recurrent unilateral inguinal hernia without obstruction or gangrene     Diverticulitis     GALINDO (acute kidney injury) (H)     Acute kidney injury (H)     Nausea and vomiting, intractability of vomiting not specified, unspecified vomiting type     Moderate protein-calorie malnutrition (H)     Recurrent Clostridioides difficile diarrhea     Severe malnutrition (H)     Abdominal cramping     Emesis     Severe protein-calorie malnutrition  (H)     Obesity (BMI 35.0-39.9) with comorbidity (H)     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 37.0 to 37.9 in adult     Serrated adenoma of colon     BPH with urinary obstruction     Urinary incontinence, unspecified type     Past Surgical History:   Procedure Laterality Date     HC KNEE SCOPE,MED/LAT MENISCUS REPAIR      Description: Knee Arthroscopy With Medial Meniscus Repair;  Recorded: 2009;     HERNIA REPAIR       GA ESOPHAGOGASTRODUODENOSCOPY TRANSORAL DIAGNOSTIC N/A 2019    Procedure: ESOPHAGOGASTRODUODENOSCOPY (EGD) with biopsies;  Surgeon: Tmair Serna MD;  Location: [facility] GI;  Service: Gastroenterology     GA LAP,SURG,COLECTOMY, PARTIAL, W/ANAST N/A 2019    Procedure: LAPAROSCOPIC sIGMOIDECTOMY;  Surgeon: Isi Lyles MD;  Location: [facility] OR;  Service: General     SIGMOIDOSCOPY FLEXIBLE N/A 2019    Procedure: Flexible SIGMOIDOSCOPY;  Surgeon: Isi Lyles MD;  Location: [facility];  Service: Gastroenterology     SIGMOIDOSCOPY FLEXIBLE N/A 2019    Procedure: Flexible SIGMOIDOSCOPY with biopsy;  Surgeon: Isi Lyles MD;  Location: [facility];  Service: Gastroenterology       Social History     Tobacco Use     Smoking status: Former Smoker     Types: Cigarettes     Quit date: 1990     Years since quittin.7     Smokeless tobacco: Former User     Types: Chew     Tobacco comment: chew daily   Substance Use Topics     Alcohol use: Yes     Comment: Alcoholic Drinks/day: occasional     History reviewed. No pertinent family history.      Current Outpatient Medications   Medication Sig Dispense Refill     amLODIPine (NORVASC) 10 MG tablet Take 0.5 tablets (5 mg) by mouth daily 45 tablet 3     medical cannabis (Patient's own supply) [MEDICAL CANNABIS (PATIENT'S OWN SUPPLY)] by Other route see administration instructions. (The purpose of this order is to document that the patient  "reports taking medical cannabis. This is not a prescription, and is not used to certify that the patient has a  qualifying medical condition.)       multivitamin (ONE A DAY) per tablet [MULTIVITAMIN (ONE A DAY) PER TABLET] Take 1 tablet by mouth daily.       pravastatin (PRAVACHOL) 20 MG tablet [PRAVASTATIN (PRAVACHOL) 20 MG TABLET] Take 1 tablet (20 mg total) by mouth every evening. 30 tablet 11     tamsulosin (FLOMAX) 0.4 MG capsule Take 1 capsule (0.4 mg) by mouth daily 90 capsule 3     zolpidem (AMBIEN) 10 MG tablet Take 0.5-1 tablets (5-10 mg) by mouth nightly as needed for sleep 30 tablet 2     Allergies   Allergen Reactions     Atorvastatin Calcium [Atorvastatin] Dizziness     Will need Flublok immunization, tetanus booster and COVID-19 Pfizer third shot booster at earliest convenience however declines today prior to traveling to [place] and will receive next week upon return.  Will require Prevnar immunization age 65.        Review of Systems  Constitutional, HEENT, cardiovascular, pulmonary, GI, , musculoskeletal, neuro, skin, endocrine and psych systems are negative, except as otherwise noted.    OBJECTIVE:   /86   Pulse 67   Ht 1.759 m (5' 9.25\")   Wt 114.8 kg (253 lb)   SpO2 94%   BMI 37.09 kg/m   Estimated body mass index is 37.09 kg/m  as calculated from the following:    Height as of this encounter: 1.759 m (5' 9.25\").    Weight as of this encounter: 114.8 kg (253 lb).  Physical Exam  GENERAL: healthy, alert and no distress.  BMI 37.09.  EYES: Eyes grossly normal to inspection, PERRL and conjunctivae and sclerae normal  HENT: ear canals and TM's normal, nose and mouth without ulcers or lesions  NECK: no adenopathy, no asymmetry, masses, or scars and thyroid normal to palpation  RESP: lungs clear to auscultation - no rales, rhonchi or wheezes  CV: regular rate and rhythm, normal S1 S2, no S3 or S4, no murmur, click or rub, no peripheral edema and peripheral pulses strong  ABDOMEN: " soft, nontender, no hepatosplenomegaly, no masses and bowel sounds normal   (male): normal male genitalia without lesions or urethral discharge, no hernia  RECTAL: normal sphincter tone, no rectal masses, prostate mildly enlarged without induration or bogginess, smooth, nontender without nodules or masses  MS: no gross musculoskeletal defects noted, no edema  SKIN: no suspicious lesions or rashes.  Sebaceous cyst midline posterior chest none inflamed.  NEURO: Normal strength and tone, mentation intact and speech normal  PSYCH: mentation appears normal, affect normal/bright    Diagnostic Test Results:  Labs reviewed in Epic    ASSESSMENT / PLAN:   Encounter for general adult medical examination with abnormal findings  Annual wellness visit completed.  Urinary incontinence concerns.  Suboptimal diet.  Risk questionnaire reviewed.  Annual wellness visits to continue.    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 37.0 to 37.9 in adult  Dietary and exercise modification for weight goal less than 240 pounds initially, less than 220 pounds ideally.    Essential hypertension, benign  Hypertension fair control blood pressure 134/86 while on amlodipine 10 mg using half tablet daily.  - Comprehensive metabolic panel  - amLODIPine (NORVASC) 10 MG tablet  Dispense: 45 tablet; Refill: 3    Hyperlipidemia LDL goal <100  Hyperlipidemia reviewed.  Pravastatin 20 mg at bedtime continuing.  Check lipid cascade today while fasting.  - Lipid panel reflex to direct LDL Fasting    JIGNA (obstructive sleep apnea)  States intolerance to CPAP.  JIGNA history.  Weight loss measures to be implemented.    Insomnia, unspecified type  Has zolpidem available on as-needed basis for infrequent use.  Focus on sleep hygiene as well as management of JIGNA for further improvement.  - zolpidem (AMBIEN) 10 MG tablet  Dispense: 30 tablet; Refill: 2    Arthritis of carpometacarpal (CMC) joint of both thumbs  Ortho  referral regarding likely first carpometacarpal joint arthritis bilateral.  - Orthopedic  Referral    Urinary incontinence, unspecified type  Urinary incontinence concerns.  Check urinalysis.  Likely BPH with urinary obstruction with urinary overflow.  Tamsulosin 0.4 mg daily.  Referral to urology if persistent concerns.  - UA reflex to Microscopic and Culture  - tamsulosin (FLOMAX) 0.4 MG capsule  Dispense: 90 capsule; Refill: 3    Encounter for screening for HIV  Routine HIV screen, low risk.  - HIV Antigen Antibody Combo    Screening for prostate cancer  Routine PSA for prostate cancer screening based on age criteria.  - Prostate Specific Antigen Screen    BPH with urinary obstruction  As above, BPH with urinary obstruction likely etiology for urinary leakage concerns and will initiate tamsulosin 0.4 mg daily and reassess at follow-up in 3 months.  - tamsulosin (FLOMAX) 0.4 MG capsule  Dispense: 90 capsule; Refill: 3    Serrated adenoma of colon  Prior serrated adenoma plus tubular adenoma x2 on colonoscopy May 29, 2018 we will repeat at 5-year interval.    Pain syndrome, chronic  Chronic pain syndrome.  Uses medical cannabis and describes pain management as well controlled recently.       Answers for HPI/ROS submitted by the patient on 10/8/2021  If you checked off any problems, how difficult have these problems made it for you to do your work, take care of things at home, or get along with other people?: Not difficult at all  PHQ9 TOTAL SCORE: 2  AVIS 7 TOTAL SCORE: 0        Patient has been advised of split billing requirements and indicates understanding: No  COUNSELING:  Reviewed preventive health counseling, as reflected in patient instructions       Consider AAA screening for ages 65-75 and smoking history       Regular exercise       Healthy diet/nutrition       Vision screening       Hearing screening       Dental care       Bladder control       Alcohol Use        HIV screening for high risk  "patient       Colon cancer screening       Prostate cancer screening    Estimated body mass index is 37.09 kg/m  as calculated from the following:    Height as of this encounter: 1.759 m (5' 9.25\").    Weight as of this encounter: 114.8 kg (253 lb).    Weight management plan: Discussed healthy diet and exercise guidelines.  Ensure ongoing efforts to achieve weight goal < 240 pounds initially, < 220 pounds ideally.     He reports that he quit smoking about 31 years ago. His smoking use included cigarettes. He has quit using smokeless tobacco.  His smokeless tobacco use included chew.      Appropriate preventive services were discussed with this patient, including applicable screening as appropriate for cardiovascular disease, diabetes, osteopenia/osteoporosis, and glaucoma.  As appropriate for age/gender, discussed screening for colorectal cancer, prostate cancer, breast cancer, and cervical cancer. Checklist reviewing preventive services available has been given to the patient.    Reviewed patients plan of care and provided an AVS. The Intermediate Care Plan ( asthma action plan, low back pain action plan, and migraine action plan) for Jay meets the Care Plan requirement. This Care Plan has been established and reviewed with the Patient.    Counseling Resources:  ATP IV Guidelines  Pooled Cohorts Equation Calculator  Breast Cancer Risk Calculator  Breast Cancer: Medication to Reduce Risk  FRAX Risk Assessment  ICSI Preventive Guidelines  Dietary Guidelines for Americans, 2010  USDA's MyPlate  ASA Prophylaxis  Lung CA Screening    Aravind Villa MD  [facility]    Identified Health Risks:  "

## 2021-10-08 NOTE — PATIENT INSTRUCTIONS
Patient Education   Personalized Prevention Plan  You are due for the preventive services outlined below.  Your care team is available to assist you in scheduling these services.  If you have already completed any of these items, please share that information with your care team to update in your medical record.  Health Maintenance Due   Topic Date Due     ANNUAL REVIEW OF HM ORDERS  Never done     HIV Screening  Never done     Diptheria Tetanus Pertussis (DTAP/TDAP/TD) Vaccine (2 - Td or Tdap) 04/28/2021     Depression Assessment  06/08/2021     Flu Vaccine (1) 09/01/2021     Your Health Risk Assessment indicates you feel you are not in good health    A healthy lifestyle helps keep the body fit and the mind alert. It helps protect you from disease, helps you fight disease, and helps prevent chronic disease (disease that doesn't go away) from getting worse. This is important as you get older and begin to notice twinges in muscles and joints and a decline in the strength and stamina you once took for granted. A healthy lifestyle includes good healthcare, good nutrition, weight control, recreation, and regular exercise. Avoid harmful substances and do what you can to keep safe. Another part of a healthy lifestyle is stay mentally active and socially involved.    Good healthcare     Have a wellness visit every year.     If you have new symptoms, let us know right away. Don't wait until the next checkup.     Take medicines exactly as prescribed and keep your medicines in a safe place. Tell us if your medicine causes problems.   Healthy diet and weight control     Eat 3 or 4 small, nutritious, low-fat, high-fiber meals a day. Include a variety of fruits, vegetables, and whole-grain foods.     Make sure you get enough calcium in your diet. Calcium, vitamin D, and exercise help prevent osteoporosis (bone thinning).     If you live alone, try eating with others when you can. That way you get a good meal and have company  while you eat it.     Try to keep a healthy weight. If you eat more calories than your body uses for energy, it will be stored as fat and you will gain weight.     Recreation   Recreation is not limited to sports and team events. It includes any activity that provides relaxation, interest, enjoyment, and exercise. Recreation provides an outlet for physical, mental, and social energy. It can give a sense of worth and achievement. It can help you stay healthy.    Mental Exercise and Social Involvement  Mental and emotional health is as important as physical health. Keep in touch with friends and family. Stay as active as possible. Continue to learn and challenge yourself.   Things you can do to stay mentally active are:    Learn something new, like a foreign language or musical instrument.     Play SCRABBLE or do crossword puzzles. If you cannot find people to play these games with you at home, you can play them with others on your computer through the Internet.     Join a games club--anything from card games to chess or checkers or lawn bowling.     Start a new hobby.     Go back to school.     Volunteer.     Read.   Keep up with world events.    Understanding USDA MyPlate  The USDA has guidelines to help you make healthy food choices. These are called MyPlate. MyPlate shows the food groups that make up healthy meals using the image of a place setting. Before you eat, think about the healthiest choices for what to put on your plate or in your cup or bowl. To learn more about building a healthy plate, visit www.choosemyplate.gov.    The food groups    Fruits. Any fruit or 100% fruit juice counts as part of the Fruit Group. Fruits may be fresh, canned, frozen, or dried, and may be whole, cut-up, or pureed. Make 1/2 of your plate fruits and vegetables.    Vegetables. Any vegetable or 100% vegetable juice counts as a member of the Vegetable Group. Vegetables may be fresh, frozen, canned, or dried. They can be served raw  or cooked and may be whole, cut-up, or mashed. Make 1/2 of your plate fruits and vegetables.    Grains. All foods made from grains are part of the Grains Group. These include wheat, rice, oats, cornmeal, and barley. Grains are often used to make foods such as bread, pasta, oatmeal, cereal, tortillas, and grits. Grains should be no more than 1/4 of your plate. At least half of your grains should be whole grains.    Protein. This group includes meat, poultry, seafood, beans and peas, eggs, processed soy products (such as tofu), nuts (including nut butters), and seeds. Make protein choices no more than 1/4 of your plate. Meat and poultry choices should be lean or low fat.    Dairy. The Dairy Group includes all fluid milk products and foods made from milk that contain calcium, such as yogurt and cheese. (Foods that have little calcium, such as cream, butter, and cream cheese, are not part of this group.) Most dairy choices should be low-fat or fat-free.    Oils. Oils aren't a food group, but they do contain essential nutrients. However it's important to watch your intake of oils. These are fats that are liquid at room temperature. They include canola, corn, olive, soybean, vegetable, and sunflower oil. Foods that are mainly oil include mayonnaise, certain salad dressings, and soft margarines. You likely already get your daily oil allowance from the foods you eat.  Things to limit  Eating healthy also means limiting these things in your diet:       Salt (sodium). Many processed foods have a lot of sodium. To keep sodium intake down, eat fresh vegetables, meats, poultry, and seafood when possible. Purchase low-sodium, reduced-sodium, or no-salt-added food products at the store. And don't add salt to your meals at home. Instead, season them with herbs and spices such as dill, oregano, cumin, and paprika. Or try adding flavor with lemon or lime zest and juice.    Saturated fat. Saturated fats are most often found in animal  products such as beef, pork, and chicken. They are often solid at room temperature, such as butter. To reduce your saturated fat intake, choose leaner cuts of meat and poultry. And try healthier cooking methods such as grilling, broiling, roasting, or baking. For a simple lower-fat swap, use plain nonfat yogurt instead of mayonnaise when making potato salad or macaroni salad.    Added sugars. These are sugars added to foods. They are in foods such as ice cream, candy, soda, fruit drinks, sports drinks, energy drinks, cookies, pastries, jams, and syrups. Cut down on added sugars by sharing sweet treats with a family member or friend. You can also choose fruit for dessert, and drink water or other unsweetened beverages.     [employer] last reviewed this educational content on 6/1/2020 2000-2021 [employer]. All rights reserved. This information is not intended as a substitute for professional medical care. Always follow your healthcare professional's instructions.          Urinary Incontinence (Male)    Urinary incontinence means not being able to control the release of urine from the bladder.   Causes  Common causes of urinary incontinence in men include:    Infection    Certain medicines    Aging    Poor pelvic muscle tone    Bladder spasms    Obesity    Trouble urinating and fully emptying the bladder (urinary retention)  Other things that can cause incontinence are:     Nervous system diseases    Diabetes    Sleep apnea    Urinary tract infections    Prostate surgery    Pelvic injury  Constipation and smoking have also been identified as risk factors.   Symptoms    Urge incontinence (overactive bladder). This is a sudden urge to urinate. It occurs even though there may not be much urine in the bladder. The need to urinate often during the night is common. It's due to bladder spasms.    Stress incontinence. This is urine leakage that you can't control. It can occur with sneezing, coughing, and other  actions that put stress on the bladder.    Treatment  Treatment depends on what is causing the condition. Bladder infections are treated with antibiotics. Urinary retention is treated with a bladder catheter.   Home care  Follow these guidelines when caring for yourself at home:    Don't have any foods and drinks that may irritate the bladder. This includes:  ? Chocolate  ? Alcohol  ? Caffeine  ? Carbonated drinks  ? Acidic fruits and juices    Limit fluids to 6 to 8 cups a day.    Lose weight if you are overweight. This will reduce your symptoms.    If advised, do regular pelvic muscle-strengthening exercises such as Kegel exercises.    If needed, wear absorbent pads to catch urine. Change the pads often. This is for good hygiene and to prevent skin and bladder infections.    Bathe daily for good hygiene.    If an antibiotic was prescribed to treat a bladder infection, take it until it's finished. Keep taking it even if you are feeling better. This is to make sure your infection has cleared.    If a catheter was left in place, keep bacteria from getting into the collection bag. Don't disconnect the catheter from the collection bag.    Use a leg band to secure the catheter drainage tube, so it does not pull on the catheter. Drain the collection bag when it becomes full. To do this, use the drain spout at the bottom of the bag. Don't disconnect the bag from the catheter.    Don't pull on or try to remove a catheter. The catheter must be removed by a healthcare provider.    If you smoke, stop. Ask your provider for help if you can't do this on your own.  Follow-up care  Follow up with your healthcare provider, or as advised.  When to get medical advice  Call your healthcare provider right away if any of these occur:    Fever over 100.4 F (38 C), or as directed by your provider    Bladder pain or fullness    Belly swelling, nausea, or vomiting    Back pain    Weakness, dizziness, or fainting    If a catheter was left  in place, return if:  ? The catheter falls out  ? The catheter stops draining for 6 hours  ? Your urine gets cloudy or smells bad  [employer] last reviewed this educational content on 1/1/2020 2000-2021 [employer]. All rights reserved. This information is not intended as a substitute for professional medical care. Always follow your healthcare professional's instructions.

## 2021-10-08 NOTE — PROGRESS NOTES
The patient was provided with suggestions to help him develop a healthy physical lifestyle.  The patient was counseled and encouraged to consider modifying their diet and eating habits. He was provided with information on recommended healthy diet options.  Information on urinary incontinence and treatment options given to patient.

## 2021-10-09 ASSESSMENT — PATIENT HEALTH QUESTIONNAIRE - PHQ9: SUM OF ALL RESPONSES TO PHQ QUESTIONS 1-9: 2

## 2021-10-09 ASSESSMENT — ANXIETY QUESTIONNAIRES: GAD7 TOTAL SCORE: 0

## 2021-10-14 ENCOUNTER — TRANSFERRED RECORDS (OUTPATIENT)
Dept: HEALTH INFORMATION MANAGEMENT | Facility: CLINIC | Age: 66
End: 2021-10-14

## 2021-10-16 ENCOUNTER — HEALTH MAINTENANCE LETTER (OUTPATIENT)
Age: 66
End: 2021-10-16

## 2021-10-20 ENCOUNTER — TRANSFERRED RECORDS (OUTPATIENT)
Dept: HEALTH INFORMATION MANAGEMENT | Facility: CLINIC | Age: 66
End: 2021-10-20

## 2021-11-01 ENCOUNTER — TRANSFERRED RECORDS (OUTPATIENT)
Dept: HEALTH INFORMATION MANAGEMENT | Facility: CLINIC | Age: 66
End: 2021-11-01

## 2021-11-08 ENCOUNTER — TRANSFERRED RECORDS (OUTPATIENT)
Dept: HEALTH INFORMATION MANAGEMENT | Facility: CLINIC | Age: 66
End: 2021-11-08

## 2021-11-08 ENCOUNTER — HOSPITAL ENCOUNTER (OUTPATIENT)
Dept: CT IMAGING | Facility: CLINIC | Age: 66
Discharge: HOME | End: 2021-11-08
Attending: INTERNAL MEDICINE | Admitting: INTERNAL MEDICINE
Payer: COMMERCIAL

## 2021-11-08 DIAGNOSIS — R10.32 LEFT LOWER QUADRANT ABDOMINAL PAIN: ICD-10-CM

## 2021-11-08 PROCEDURE — 250N000011 HC RX IP 250 OP 636: Performed by: INTERNAL MEDICINE

## 2021-11-08 PROCEDURE — 74177 CT ABD & PELVIS W/CONTRAST: CPT

## 2021-11-08 RX ORDER — IOPAMIDOL 755 MG/ML
100 INJECTION, SOLUTION INTRAVASCULAR ONCE
Status: COMPLETED | OUTPATIENT
Start: 2021-11-08 | End: 2021-11-08

## 2021-11-08 RX ADMIN — IOPAMIDOL 100 ML: 755 INJECTION, SOLUTION INTRAVENOUS at 13:15

## 2021-11-18 ENCOUNTER — TRANSFERRED RECORDS (OUTPATIENT)
Dept: HEALTH INFORMATION MANAGEMENT | Facility: CLINIC | Age: 66
End: 2021-11-18
Payer: COMMERCIAL

## 2021-12-07 ENCOUNTER — TRANSFERRED RECORDS (OUTPATIENT)
Dept: HEALTH INFORMATION MANAGEMENT | Facility: CLINIC | Age: 66
End: 2021-12-07
Payer: COMMERCIAL

## 2021-12-09 DIAGNOSIS — I10 ESSENTIAL HYPERTENSION, BENIGN: ICD-10-CM

## 2021-12-09 DIAGNOSIS — E78.5 HYPERLIPIDEMIA LDL GOAL <100: ICD-10-CM

## 2021-12-09 RX ORDER — AMLODIPINE BESYLATE 5 MG/1
5 TABLET ORAL DAILY
Qty: 90 TABLET | Refills: 3 | Status: SHIPPED | OUTPATIENT
Start: 2021-12-09 | End: 2022-12-02

## 2021-12-09 NOTE — TELEPHONE ENCOUNTER
Reason for Call:  Medication or medication refill:    Do you use a [facility]?  Name of the pharmacy and phone number for the current request:  [facility] [place]    Name of the medication requested: pravastatin and amlodapine    Other request: patient is only taking 5 mg of amlodapine and he is getting 10 mg tablets. Can dr weinstein give him refill and 5 mg tablets?    Can we leave a detailed message on this number? Not Applicable    Phone number patient can be reached at: Home number on file [phone] (home)    Best Time: asap    Call taken on 12/9/2021 at 11:16 AM by Marli Storey

## 2021-12-10 RX ORDER — PRAVASTATIN SODIUM 20 MG
20 TABLET ORAL EVERY EVENING
Qty: 90 TABLET | Refills: 3 | Status: SHIPPED | OUTPATIENT
Start: 2021-12-10 | End: 2022-11-28

## 2021-12-23 ENCOUNTER — TRANSFERRED RECORDS (OUTPATIENT)
Dept: HEALTH INFORMATION MANAGEMENT | Facility: CLINIC | Age: 66
End: 2021-12-23
Payer: COMMERCIAL

## 2022-01-24 DIAGNOSIS — N40.1 BPH WITH URINARY OBSTRUCTION: ICD-10-CM

## 2022-01-24 DIAGNOSIS — N13.8 BPH WITH URINARY OBSTRUCTION: ICD-10-CM

## 2022-01-24 DIAGNOSIS — R32 URINARY INCONTINENCE, UNSPECIFIED TYPE: ICD-10-CM

## 2022-01-24 RX ORDER — TAMSULOSIN HYDROCHLORIDE 0.4 MG/1
0.4 CAPSULE ORAL DAILY
Qty: 90 CAPSULE | Refills: 3 | Status: SHIPPED | OUTPATIENT
Start: 2022-01-24 | End: 2022-10-13

## 2022-04-01 ENCOUNTER — TELEPHONE (OUTPATIENT)
Dept: FAMILY MEDICINE | Facility: CLINIC | Age: 67
End: 2022-04-01
Payer: COMMERCIAL

## 2022-04-01 DIAGNOSIS — L30.9 DERMATITIS: Primary | ICD-10-CM

## 2022-04-01 RX ORDER — BETAMETHASONE DIPROPIONATE 0.5 MG/G
OINTMENT, AUGMENTED TOPICAL
Qty: 45 G | Refills: 1 | Status: SHIPPED | OUTPATIENT
Start: 2022-04-01 | End: 2023-12-18

## 2022-04-01 NOTE — TELEPHONE ENCOUNTER
Reason for Call:  Medication or medication refill:    Do you use a [facility]?  Name of the pharmacy and phone number for the current request:  [facility] in [place]    Name of the medication requested: Betamethasone    Other request: No longer on Active Medication list, but will need this medication again ASAP.    Can we leave a detailed message on this number? YES    Phone number patient can be reached at: Home number on file [phone] (home)    Best Time: ANY    Call taken on 4/1/2022 at 12:31 PM by Chau Walsh

## 2022-04-08 ENCOUNTER — TRANSFERRED RECORDS (OUTPATIENT)
Dept: HEALTH INFORMATION MANAGEMENT | Facility: CLINIC | Age: 67
End: 2022-04-08
Payer: COMMERCIAL

## 2022-06-16 ENCOUNTER — VIRTUAL VISIT (OUTPATIENT)
Dept: INTERNAL MEDICINE | Facility: CLINIC | Age: 67
End: 2022-06-16
Payer: COMMERCIAL

## 2022-06-16 DIAGNOSIS — I10 BENIGN ESSENTIAL HYPERTENSION: ICD-10-CM

## 2022-06-16 DIAGNOSIS — E66.01 MORBID OBESITY (H): ICD-10-CM

## 2022-06-16 DIAGNOSIS — U07.1 INFECTION DUE TO 2019 NOVEL CORONAVIRUS: Primary | ICD-10-CM

## 2022-06-16 PROCEDURE — 99213 OFFICE O/P EST LOW 20 MIN: CPT | Mod: CS | Performed by: INTERNAL MEDICINE

## 2022-06-16 NOTE — PROGRESS NOTES
VIDEO VISIT                                                       ASSESSMENT/PLAN                                                      (U07.1) Infection due to 2019 novel coronavirus  (primary encounter diagnosis)  (E66.01) Obesity (BMI 35.0-39.9) with comorbidity (H)  (I10) Benign essential hypertension  Comment: patient is at high risk for complications from COVID-19 due to age, obesity, and high blood pressure.  Plan: course of Paxlovid prescribed - patient to use as directed; potential side effects from Paxlovid discussed with and accepted by patient; HOLD Flomax and amlodipine while taking Paxlovid; supportive care measures and red flag symptoms discussed with patient.    Total time of video call between patient and provider was 8 minutes (2:54-3:02pm). Provider location: office. Patient location: home. Platform: [employer].     Coty Giles MD   [facility]  [address] [address]  T: [phone], F: [phone]    SUBJECTIVE                                                      Jay Roth is a very pleasant 67 year old male who requested a video visit to discuss treatment for COVID-19:    Patient was made aware that this visit will be billed the same as an in-person visit and has given verbal consent to proceed with this video visit.     Patient developed symptoms last night. Symptoms include body aches, headaches, and a dry cough.  Tested positive for COVID-19 (home test) this morning.    PMH significant for HTN and obesity.   SH significant for former smoker.    Patient is vaccinated and boosted x2 for COVID-19.    OBJECTIVE                                                       Vitals: No vitals were obtained today due to virtual visit.    General: appears healthy, alert and no distress  Psychiatric: mentation normal, affect normal/bright, judgement and insight intact, normal speech and appearance well-groomed    ---    (Note was completed, in part, with [employer]  voice-recognition software. Documentation reviewed, but some grammatical, spelling, and word errors may remain.)

## 2022-10-01 ENCOUNTER — HEALTH MAINTENANCE LETTER (OUTPATIENT)
Age: 67
End: 2022-10-01

## 2022-10-04 ENCOUNTER — TRANSFERRED RECORDS (OUTPATIENT)
Dept: HEALTH INFORMATION MANAGEMENT | Facility: CLINIC | Age: 67
End: 2022-10-04

## 2022-10-06 ASSESSMENT — ENCOUNTER SYMPTOMS
PALPITATIONS: 0
FEVER: 0
HEMATURIA: 0
DYSURIA: 0
ARTHRALGIAS: 0
PARESTHESIAS: 0
HEADACHES: 0
CONSTIPATION: 0
CHILLS: 0
NAUSEA: 0
EYE PAIN: 0
NERVOUS/ANXIOUS: 0
DIARRHEA: 0
WEAKNESS: 0
DIZZINESS: 0
JOINT SWELLING: 1
MYALGIAS: 1
ABDOMINAL PAIN: 0
HEMATOCHEZIA: 0
FREQUENCY: 1
SHORTNESS OF BREATH: 0
HEARTBURN: 0
COUGH: 1
SORE THROAT: 0

## 2022-10-06 ASSESSMENT — ACTIVITIES OF DAILY LIVING (ADL): CURRENT_FUNCTION: NO ASSISTANCE NEEDED

## 2022-10-13 ENCOUNTER — OFFICE VISIT (OUTPATIENT)
Dept: FAMILY MEDICINE | Facility: CLINIC | Age: 67
End: 2022-10-13
Payer: COMMERCIAL

## 2022-10-13 VITALS
SYSTOLIC BLOOD PRESSURE: 136 MMHG | HEART RATE: 93 BPM | BODY MASS INDEX: 38.06 KG/M2 | DIASTOLIC BLOOD PRESSURE: 82 MMHG | WEIGHT: 257 LBS | HEIGHT: 69 IN | OXYGEN SATURATION: 95 %

## 2022-10-13 DIAGNOSIS — G47.00 INSOMNIA, UNSPECIFIED TYPE: ICD-10-CM

## 2022-10-13 DIAGNOSIS — E78.5 HYPERLIPIDEMIA LDL GOAL <100: ICD-10-CM

## 2022-10-13 DIAGNOSIS — N13.8 BPH WITH URINARY OBSTRUCTION: ICD-10-CM

## 2022-10-13 DIAGNOSIS — D12.6 SERRATED ADENOMA OF COLON: ICD-10-CM

## 2022-10-13 DIAGNOSIS — E66.812 CLASS 2 DRUG-INDUCED OBESITY WITHOUT SERIOUS COMORBIDITY WITH BODY MASS INDEX (BMI) OF 37.0 TO 37.9 IN ADULT: ICD-10-CM

## 2022-10-13 DIAGNOSIS — Z23 ENCOUNTER FOR IMMUNIZATION: ICD-10-CM

## 2022-10-13 DIAGNOSIS — E80.6 HYPERBILIRUBINEMIA: ICD-10-CM

## 2022-10-13 DIAGNOSIS — R32 URINARY INCONTINENCE, UNSPECIFIED TYPE: ICD-10-CM

## 2022-10-13 DIAGNOSIS — Z12.5 SCREENING FOR PROSTATE CANCER: ICD-10-CM

## 2022-10-13 DIAGNOSIS — G47.33 OSA (OBSTRUCTIVE SLEEP APNEA): ICD-10-CM

## 2022-10-13 DIAGNOSIS — I10 ESSENTIAL HYPERTENSION, BENIGN: ICD-10-CM

## 2022-10-13 DIAGNOSIS — R73.01 IMPAIRED FASTING GLUCOSE: ICD-10-CM

## 2022-10-13 DIAGNOSIS — N40.1 BPH WITH URINARY OBSTRUCTION: ICD-10-CM

## 2022-10-13 DIAGNOSIS — M17.12 PRIMARY OSTEOARTHRITIS OF LEFT KNEE: ICD-10-CM

## 2022-10-13 DIAGNOSIS — M54.50 LOW BACK PAIN, UNSPECIFIED BACK PAIN LATERALITY, UNSPECIFIED CHRONICITY, UNSPECIFIED WHETHER SCIATICA PRESENT: ICD-10-CM

## 2022-10-13 DIAGNOSIS — E66.1 CLASS 2 DRUG-INDUCED OBESITY WITHOUT SERIOUS COMORBIDITY WITH BODY MASS INDEX (BMI) OF 37.0 TO 37.9 IN ADULT: ICD-10-CM

## 2022-10-13 DIAGNOSIS — Z00.00 ENCOUNTER FOR MEDICARE ANNUAL WELLNESS EXAM: Primary | ICD-10-CM

## 2022-10-13 PROBLEM — E44.0 MODERATE PROTEIN-CALORIE MALNUTRITION (H): Status: RESOLVED | Noted: 2019-12-28 | Resolved: 2022-10-13

## 2022-10-13 PROBLEM — E43 SEVERE PROTEIN-CALORIE MALNUTRITION (H): Status: RESOLVED | Noted: 2020-01-24 | Resolved: 2022-10-13

## 2022-10-13 PROCEDURE — 90677 PCV20 VACCINE IM: CPT | Performed by: FAMILY MEDICINE

## 2022-10-13 PROCEDURE — G0439 PPPS, SUBSEQ VISIT: HCPCS | Performed by: FAMILY MEDICINE

## 2022-10-13 PROCEDURE — 90715 TDAP VACCINE 7 YRS/> IM: CPT | Performed by: FAMILY MEDICINE

## 2022-10-13 PROCEDURE — G0009 ADMIN PNEUMOCOCCAL VACCINE: HCPCS | Performed by: FAMILY MEDICINE

## 2022-10-13 PROCEDURE — 99214 OFFICE O/P EST MOD 30 MIN: CPT | Mod: 25 | Performed by: FAMILY MEDICINE

## 2022-10-13 PROCEDURE — 90472 IMMUNIZATION ADMIN EACH ADD: CPT | Performed by: FAMILY MEDICINE

## 2022-10-13 RX ORDER — CHLORAL HYDRATE 500 MG
2 CAPSULE ORAL DAILY
COMMUNITY

## 2022-10-13 RX ORDER — TAMSULOSIN HYDROCHLORIDE 0.4 MG/1
0.8 CAPSULE ORAL DAILY
Qty: 180 CAPSULE | Refills: 3 | Status: SHIPPED | OUTPATIENT
Start: 2022-10-13 | End: 2023-09-05

## 2022-10-13 RX ORDER — ESZOPICLONE 2 MG/1
2 TABLET, FILM COATED ORAL AT BEDTIME
Qty: 30 TABLET | Refills: 5 | Status: SHIPPED | OUTPATIENT
Start: 2022-10-13 | End: 2022-10-15

## 2022-10-13 ASSESSMENT — ENCOUNTER SYMPTOMS
PALPITATIONS: 0
ARTHRALGIAS: 0
PARESTHESIAS: 0
FEVER: 0
SHORTNESS OF BREATH: 0
WEAKNESS: 0
DYSURIA: 0
ABDOMINAL PAIN: 0
FREQUENCY: 1
HEMATURIA: 0
COUGH: 1
DIZZINESS: 0
NAUSEA: 0
CONSTIPATION: 0
HEADACHES: 0
CHILLS: 0
EYE PAIN: 0
HEMATOCHEZIA: 0
DIARRHEA: 0
NERVOUS/ANXIOUS: 0
MYALGIAS: 1
HEARTBURN: 0
JOINT SWELLING: 1
SORE THROAT: 0

## 2022-10-13 ASSESSMENT — PATIENT HEALTH QUESTIONNAIRE - PHQ9
10. IF YOU CHECKED OFF ANY PROBLEMS, HOW DIFFICULT HAVE THESE PROBLEMS MADE IT FOR YOU TO DO YOUR WORK, TAKE CARE OF THINGS AT HOME, OR GET ALONG WITH OTHER PEOPLE: NOT DIFFICULT AT ALL
SUM OF ALL RESPONSES TO PHQ QUESTIONS 1-9: 2
SUM OF ALL RESPONSES TO PHQ QUESTIONS 1-9: 2

## 2022-10-13 ASSESSMENT — ACTIVITIES OF DAILY LIVING (ADL): CURRENT_FUNCTION: NO ASSISTANCE NEEDED

## 2022-10-13 NOTE — PATIENT INSTRUCTIONS
Patient Education   Personalized Prevention Plan  You are due for the preventive services outlined below.  Your care team is available to assist you in scheduling these services.  If you have already completed any of these items, please share that information with your care team to update in your medical record.  Health Maintenance Due   Topic Date Due     ANNUAL REVIEW OF HM ORDERS  Never done     Hepatitis B Vaccine (1 of 3 - 3-dose series) Never done     Diptheria Tetanus Pertussis (DTAP/TDAP/TD) Vaccine (2 - Td or Tdap) 04/28/2021     COVID-19 Vaccine (5 - Booster for Pfizer series) 06/17/2022     Flu Vaccine (1) 09/01/2022     Pneumococcal Vaccine (1 - PCV) 06/05/2022

## 2022-10-13 NOTE — PROGRESS NOTES
"SUBJECTIVE:     Grzegorz is a 67 year old who presents for Preventive Visit.      Annual wellness visit completed.  Risk questionnaire reviewed in detail.  Known history of hypertension.  Continuing amlodipine 10 mg daily.  Pravastatin 20 mg at bedtime for lipid management.  Intolerant to CPAP for JIGNA management.  Zolpidem allows for about 3 hours asleep and then awakens.  Is interested in trying different medicine.  Her first carpometacarpal joint arthritis bilateral.  Left greater than right knee bone-on-bone osteoarthritis of knee described.  Chronic low back pain and has been on disability.  Medicinal marijuana historically.  Impaired fasting glucose.  BPH and still gets up frequently at night while on tamsulosin 0.4 mg daily.  Has had mild bilirubin elevation historically likely Gilbert's syndrome.  Denies recent illness.  Comprehensive review of systems as above otherwise all negative.  Does agree to pneumococcal 20 and Adacel booster wants to wait on seasonal flu shot and COVID-19 vaccination at this time until returns from upcoming trip       - Lovely   3 children - 2 sons, 1 daughter   5 grandchildren   Quit smoking ~ 20 years   Quit 19 - prior chew 1 tin per 3 days   EtOH: occ   Mom -  84 kidney failure   Dad -  86 \"wore out\"   2 bros -   1 sis -   Surgeries: knee arthroscopy bilateral; left inguinal hernia; laparoscopic sigmoid colectomy 19   Radiofrequency ablation procedure x 3 for lower back pain   Hospitalizations: 19 through 2020 (3 total admissions required) due to GI concerns   Work: retired as beer distributor (L1-L4 degenerative back issues) - oxycodone if walk, etc.   Hobbies: swim 5x/week, fishing;      Patient has been advised of split billing requirements and indicates understanding: No  Are you in the first 12 months of your Medicare coverage?  NO    Healthy Habits:     In general, how would you rate your overall health?  Fair    Frequency of exercise:  " "4-5 days/week    Duration of exercise:  Greater than 60 minutes    Do you usually eat at least 4 servings of fruit and vegetables a day, include whole grains    & fiber and avoid regularly eating high fat or \"junk\" foods?  No    Taking medications regularly:  Yes    Ability to successfully perform activities of daily living:  No assistance needed    Home Safety:  No safety concerns identified    Hearing Impairment:  No hearing concerns    In the past 6 months, have you been bothered by leaking of urine? Yes    In general, how would you rate your overall mental or emotional health?  Good      PHQ-2 Total Score: 0    Additional concerns today:  No    Do you feel safe in your environment? Yes    Have you ever done Advance Care Planning? (For example, a Health Directive, POLST, or a discussion with a medical provider or your loved ones about your wishes): Yes, advance care planning is on file.      Denies hearing concerns      Fall risk  Fallen 2 or more times in the past year?: Yes  Any fall with injury in the past year?: No    Cognitive Screening   1) Repeat 3 items (Leader, Season, Table)    2) Clock draw: NORMAL  3) 3 item recall: Recalls 3 objects  Results: 3 items recalled: COGNITIVE IMPAIRMENT LESS LIKELY    Mini-CogTM Copyright S Dk. Licensed by the author for use in [facility]; reprinted with permission (robin@.[facility]). All rights reserved.      Do you have sleep apnea, excessive snoring or daytime drowsiness?: yes, intolerant to CPAP    Reviewed and updated as needed this visit by clinical staff   Tobacco  Allergies  Meds  Problems  Med Hx  Surg Hx  Fam Hx          Reviewed and updated as needed this visit by Provider                 Social History     Tobacco Use     Smoking status: Former     Types: Cigarettes     Quit date: 1990     Years since quittin.8     Smokeless tobacco: Former     Types: Chew     Tobacco comments:     chew daily   Substance Use Topics     Alcohol use: " Yes     Comment: Alcoholic Drinks/day: occasional         Alcohol Use 10/6/2022   Prescreen: >3 drinks/day or >7 drinks/week? No   Prescreen: >3 drinks/day or >7 drinks/week? -               Current providers sharing in care for this patient include:   Patient Care Team:  Aravind Villa MD as PCP - General (Family Practice)  Aravind Villa MD as Assigned PCP    The following health maintenance items are reviewed in Epic and correct as of today:  Health Maintenance   Topic Date Due     HEPATITIS B IMMUNIZATION (1 of 3 - 3-dose series) Never done     DTAP/TDAP/TD IMMUNIZATION (2 - Td or Tdap) 04/28/2021     COVID-19 Vaccine (5 - Booster for Pfizer series) 06/17/2022     INFLUENZA VACCINE (1) 09/01/2022     Pneumococcal Vaccine: 65+ Years (1 - PCV) 06/05/2022     PHQ-9  04/13/2023     MEDICARE ANNUAL WELLNESS VISIT  10/13/2023     ANNUAL REVIEW OF HM ORDERS  10/13/2023     FALL RISK ASSESSMENT  10/13/2023     LIPID  10/08/2026     ADVANCE CARE PLANNING  10/13/2027     COLORECTAL CANCER SCREENING  05/29/2028     HEPATITIS C SCREENING  Completed     HIV SCREENING  Completed     ZOSTER IMMUNIZATION  Completed     AORTIC ANEURYSM SCREENING (SYSTEM ASSIGNED)  Completed     IPV IMMUNIZATION  Aged Out     MENINGITIS IMMUNIZATION  Aged Out     LUNG CANCER SCREENING  Discontinued     Lab work is in process  Labs reviewed in EPIC  BP Readings from Last 3 Encounters:   10/13/22 136/82   10/08/21 134/86   05/24/21 130/89    Wt Readings from Last 3 Encounters:   10/13/22 116.6 kg (257 lb)   10/08/21 114.8 kg (253 lb)   11/11/20 117.6 kg (259 lb 3.2 oz)                  Patient Active Problem List   Diagnosis     Smokeless tobacco use     Hyperlipidemia LDL goal <100     Benign Prostatic Hypertrophy     Cerumen Impaction     Lower Back Pain     Diverticulosis     Hematuria     JIGNA (obstructive sleep apnea)     Benign Essential Hypertension     Insomnia     Colon polyp     History of Bell's palsy     Hyperbilirubinemia      Acute diverticulitis     Non-recurrent unilateral inguinal hernia without obstruction or gangrene     Diverticulitis     GALINDO (acute kidney injury) (H)     Acute kidney injury (H)     Nausea and vomiting, intractability of vomiting not specified, unspecified vomiting type     Recurrent Clostridioides difficile diarrhea     Abdominal cramping     Emesis     Obesity (BMI 35.0-39.9) with comorbidity (H)     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 37.0 to 37.9 in adult     Serrated adenoma of colon     BPH with urinary obstruction     Urinary incontinence, unspecified type     Past Surgical History:   Procedure Laterality Date     HC KNEE SCOPE,MED/LAT MENISCUS REPAIR      Description: Knee Arthroscopy With Medial Meniscus Repair;  Recorded: 2009;     HERNIA REPAIR       CA ESOPHAGOGASTRODUODENOSCOPY TRANSORAL DIAGNOSTIC N/A 2019    Procedure: ESOPHAGOGASTRODUODENOSCOPY (EGD) with biopsies;  Surgeon: Tamir Serna MD;  Location: [facility];  Service: Gastroenterology     CA LAP,SURG,COLECTOMY, PARTIAL, W/ANAST N/A 2019    Procedure: LAPAROSCOPIC sIGMOIDECTOMY;  Surgeon: Isi Lyles MD;  Location: [facility] OR;  Service: General     SIGMOIDOSCOPY FLEXIBLE N/A 2019    Procedure: Flexible SIGMOIDOSCOPY;  Surgeon: Isi Lyles MD;  Location: [facility];  Service: Gastroenterology     SIGMOIDOSCOPY FLEXIBLE N/A 2019    Procedure: Flexible SIGMOIDOSCOPY with biopsy;  Surgeon: Isi Lyles MD;  Location: [facility];  Service: Gastroenterology       Social History     Tobacco Use     Smoking status: Former     Types: Cigarettes     Quit date: 1990     Years since quittin.8     Smokeless tobacco: Former     Types: Chew     Tobacco comments:     chew daily   Substance Use Topics     Alcohol use: Yes     Comment: Alcoholic Drinks/day: occasional     History reviewed. No pertinent family history.       Current Outpatient Medications   Medication Sig Dispense Refill     amLODIPine (NORVASC) 5 MG tablet Take 1 tablet (5 mg) by mouth daily 90 tablet 3     augmented betamethasone dipropionate (DIPROLENE-AF) 0.05 % external ointment Apply topically sparingly to affected areas twice daily as needed 45 g 1     eszopiclone (LUNESTA) 2 MG tablet Take 1 tablet (2 mg) by mouth At Bedtime 30 tablet 5     fish oil-omega-3 fatty acids 1000 MG capsule Take 2 g by mouth daily       medical cannabis (Patient's own supply) [MEDICAL CANNABIS (PATIENT'S OWN SUPPLY)] by Other route see administration instructions. (The purpose of this order is to document that the patient reports taking medical cannabis. This is not a prescription, and is not used to certify that the patient has a  qualifying medical condition.)       multivitamin (ONE A DAY) per tablet [MULTIVITAMIN (ONE A DAY) PER TABLET] Take 1 tablet by mouth daily.       pravastatin (PRAVACHOL) 20 MG tablet Take 1 tablet (20 mg) by mouth every evening 90 tablet 3     tamsulosin (FLOMAX) 0.4 MG capsule Take 2 capsules (0.8 mg) by mouth daily 180 capsule 3     Allergies   Allergen Reactions     Atorvastatin Calcium [Atorvastatin] Dizziness     Pneumococcal-20 and Tdap today (will get flu shot and COVID booster in near future)        Review of Systems   Constitutional: Negative for chills and fever.   HENT: Negative for congestion, ear pain, hearing loss and sore throat.    Eyes: Negative for pain and visual disturbance.   Respiratory: Positive for cough. Negative for shortness of breath.    Cardiovascular: Positive for peripheral edema. Negative for chest pain and palpitations.   Gastrointestinal: Negative for abdominal pain, constipation, diarrhea, heartburn, hematochezia and nausea.   Genitourinary: Positive for frequency, impotence and urgency. Negative for dysuria, genital sores, hematuria and penile discharge.   Musculoskeletal: Positive for joint swelling and myalgias.  "Negative for arthralgias.   Skin: Negative for rash.   Neurological: Negative for dizziness, weakness, headaches and paresthesias.   Psychiatric/Behavioral: Negative for mood changes. The patient is not nervous/anxious.      Constitutional, HEENT, cardiovascular, pulmonary, GI, , musculoskeletal, neuro, skin, endocrine and psych systems are negative, except as otherwise noted.    OBJECTIVE:   /82   Pulse 93   Ht 1.753 m (5' 9\")   Wt 116.6 kg (257 lb)   SpO2 95%   BMI 37.95 kg/m   Estimated body mass index is 37.95 kg/m  as calculated from the following:    Height as of this encounter: 1.753 m (5' 9\").    Weight as of this encounter: 116.6 kg (257 lb).  Physical Exam  GENERAL: healthy, alert and no distress  EYES: Eyes grossly normal to inspection, PERRL and conjunctivae and sclerae normal  HENT: ear canals and TM's normal, nose and mouth without ulcers or lesions  NECK: no adenopathy, no asymmetry, masses, or scars and thyroid normal to palpation  RESP: lungs clear to auscultation - no rales, rhonchi or wheezes  CV: regular rate and rhythm, normal S1 S2, no S3 or S4, no murmur, click or rub, no peripheral edema and peripheral pulses strong  ABDOMEN: soft, nontender, no hepatosplenomegaly, no masses and bowel sounds normal   (male): normal male genitalia without lesions or urethral discharge, no hernia  RECTAL: normal sphincter tone, no rectal masses, prostate normal size, smooth, nontender without nodules or masses  MS: no gross musculoskeletal defects noted, no edema  SKIN: no suspicious lesions or rashes  NEURO: Normal strength and tone, mentation intact and speech normal  PSYCH: mentation appears normal, affect normal/bright    Diagnostic Test Results:  Labs reviewed in Epic  No results found for this or any previous visit (from the past 24 hour(s)).    ASSESSMENT / PLAN:     Encounter for Medicare annual wellness exam  Annual wellness visit.  Risk questionnaire reviewed in detail.  Annual wellness " visits to continue.    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  - TDAP VACCINE (Adacel, Boostrix)  - Pneumococcal 20 Valent Conjugate (PCV20)    Essential hypertension, benign  Hypertension fair control on recheck with continuation of amlodipine 5 mg daily.  - Comprehensive metabolic panel    Hyperlipidemia LDL goal <100  Check lipid cascade today while fasting.  - Lipid panel reflex to direct LDL Fasting    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 37.0 to 37.9 in adult  Dietary and exercise modifications reviewed.  12 pound weight loss over the last month with weight watchers.  Weight goal less than 240 pounds initially, less than 220 pounds ideally.    Insomnia, unspecified type  Zolpidem without significant benefit beyond 3 hours.  Trial of Lunesta 2 mg at bedtime and may titrate to 3 mg dosing if persistent issues.  - eszopiclone (LUNESTA) 2 MG tablet  Dispense: 30 tablet; Refill: 5    Serrated adenoma of colon  Serrated adenoma of colon and had prior colonoscopy May 29, 2018 and will repeat at 5-year interval.    BPH with urinary obstruction  BPH with urinary obstruction despite tamsulosin 0.4 mg daily.  Increase to 0.8 mg dosing daily and reassess at follow-up in 3 to 6 months.  - tamsulosin (FLOMAX) 0.4 MG capsule  Dispense: 180 capsule; Refill: 3    Impaired fasting glucose  Impaired fasting glucose.  A1c and fasting glucose to be obtained.  - Comprehensive metabolic panel  - Hemoglobin A1c    Screening for prostate cancer  PSA for prostate cancer screening.  - Prostate Specific Antigen Screen    JIGNA (obstructive sleep apnea)  Intolerant to CPAP.    Hyperbilirubinemia  Has had mild bilirubin elevation.  CBC to ensure no evidence for hemolysis.  Comprehensive metabolic panel.  - CBC with  "platelets  - Comprehensive metabolic panel    Primary osteoarthritis of left knee  Anticipates future left total knee arthroplasty for primary osteoarthritis.    Urinary incontinence, unspecified type  As above, increase tamsulosin from 0.4 mg up to 0.8 mg daily.  - tamsulosin (FLOMAX) 0.4 MG capsule  Dispense: 180 capsule; Refill: 3    Low back pain, unspecified back pain laterality, unspecified chronicity, unspecified whether sciatica present  Continues management for low back pain with prior disability reviewed.       Patient has been advised of split billing requirements and indicates understanding: No    COUNSELING:  Reviewed preventive health counseling, as reflected in patient instructions       Regular exercise       Healthy diet/nutrition       Vision screening       Hearing screening       Dental care       Bladder control       Fall risk prevention       Colon cancer screening       Prostate cancer screening    Estimated body mass index is 37.95 kg/m  as calculated from the following:    Height as of this encounter: 1.753 m (5' 9\").    Weight as of this encounter: 116.6 kg (257 lb).    Weight management plan: Discussed healthy diet and exercise guidelines    He reports that he quit smoking about 32 years ago. His smoking use included cigarettes. He has quit using smokeless tobacco.  His smokeless tobacco use included chew.      Appropriate preventive services were discussed with this patient, including applicable screening as appropriate for cardiovascular disease, diabetes, osteopenia/osteoporosis, and glaucoma.  As appropriate for age/gender, discussed screening for colorectal cancer, prostate cancer, breast cancer, and cervical cancer. Checklist reviewing preventive services available has been given to the patient.    Reviewed patients plan of care and provided an AVS. The Intermediate Care Plan ( asthma action plan, low back pain action plan, and migraine action plan) for Jay meets the Care Plan " requirement. This Care Plan has been established and reviewed with the Patient.    Counseling Resources:  ATP IV Guidelines  Pooled Cohorts Equation Calculator  Breast Cancer Risk Calculator  Breast Cancer: Medication to Reduce Risk  FRAX Risk Assessment  ICSI Preventive Guidelines  Dietary Guidelines for Americans, 2010  [employer]'s MyPlate  ASA Prophylaxis  Lung CA Screening    Aravind Villa MD  [facility]    Identified Health Risks:  Answers for HPI/ROS submitted by the patient on 10/13/2022  If you checked off any problems, how difficult have these problems made it for you to do your work, take care of things at home, or get along with other people?: Not difficult at all  PHQ9 TOTAL SCORE: 2

## 2022-10-14 ENCOUNTER — LAB (OUTPATIENT)
Dept: LAB | Facility: CLINIC | Age: 67
End: 2022-10-14
Payer: COMMERCIAL

## 2022-10-14 DIAGNOSIS — E78.5 HYPERLIPIDEMIA LDL GOAL <100: ICD-10-CM

## 2022-10-14 DIAGNOSIS — E80.6 HYPERBILIRUBINEMIA: ICD-10-CM

## 2022-10-14 DIAGNOSIS — I10 ESSENTIAL HYPERTENSION, BENIGN: ICD-10-CM

## 2022-10-14 DIAGNOSIS — Z12.5 SCREENING FOR PROSTATE CANCER: ICD-10-CM

## 2022-10-14 DIAGNOSIS — R73.01 IMPAIRED FASTING GLUCOSE: ICD-10-CM

## 2022-10-14 LAB
ALBUMIN SERPL BCG-MCNC: 4.2 G/DL (ref 3.5–5.2)
ALP SERPL-CCNC: 65 U/L (ref 40–129)
ALT SERPL W P-5'-P-CCNC: 26 U/L (ref 10–50)
ANION GAP SERPL CALCULATED.3IONS-SCNC: 9 MMOL/L (ref 7–15)
AST SERPL W P-5'-P-CCNC: 28 U/L (ref 10–50)
BILIRUB SERPL-MCNC: 0.6 MG/DL
BUN SERPL-MCNC: 23.2 MG/DL (ref 8–23)
CALCIUM SERPL-MCNC: 9.3 MG/DL (ref 8.8–10.2)
CHLORIDE SERPL-SCNC: 102 MMOL/L (ref 98–107)
CHOLEST SERPL-MCNC: 177 MG/DL
CREAT SERPL-MCNC: 1.2 MG/DL (ref 0.67–1.17)
DEPRECATED HCO3 PLAS-SCNC: 27 MMOL/L (ref 22–29)
ERYTHROCYTE [DISTWIDTH] IN BLOOD BY AUTOMATED COUNT: 13.1 % (ref 10–15)
GFR SERPL CREATININE-BSD FRML MDRD: 67 ML/MIN/1.73M2
GLUCOSE SERPL-MCNC: 89 MG/DL (ref 70–99)
HBA1C MFR BLD: 5 % (ref 0–5.6)
HCT VFR BLD AUTO: 49.7 % (ref 40–53)
HDLC SERPL-MCNC: 48 MG/DL
HGB BLD-MCNC: 16.9 G/DL (ref 13.3–17.7)
LDLC SERPL CALC-MCNC: 103 MG/DL
MCH RBC QN AUTO: 30 PG (ref 26.5–33)
MCHC RBC AUTO-ENTMCNC: 34 G/DL (ref 31.5–36.5)
MCV RBC AUTO: 88 FL (ref 78–100)
NONHDLC SERPL-MCNC: 129 MG/DL
PLATELET # BLD AUTO: 173 10E3/UL (ref 150–450)
POTASSIUM SERPL-SCNC: 4.6 MMOL/L (ref 3.4–5.3)
PROT SERPL-MCNC: 6.9 G/DL (ref 6.4–8.3)
PSA SERPL-MCNC: 0.55 NG/ML (ref 0–4.5)
RBC # BLD AUTO: 5.64 10E6/UL (ref 4.4–5.9)
SODIUM SERPL-SCNC: 138 MMOL/L (ref 136–145)
TRIGL SERPL-MCNC: 128 MG/DL
WBC # BLD AUTO: 6.6 10E3/UL (ref 4–11)

## 2022-10-14 PROCEDURE — 85027 COMPLETE CBC AUTOMATED: CPT

## 2022-10-14 PROCEDURE — 80053 COMPREHEN METABOLIC PANEL: CPT

## 2022-10-14 PROCEDURE — 80061 LIPID PANEL: CPT

## 2022-10-14 PROCEDURE — 83036 HEMOGLOBIN GLYCOSYLATED A1C: CPT

## 2022-10-14 PROCEDURE — 36415 COLL VENOUS BLD VENIPUNCTURE: CPT

## 2022-10-14 PROCEDURE — G0103 PSA SCREENING: HCPCS

## 2022-10-15 DIAGNOSIS — G47.00 INSOMNIA, UNSPECIFIED TYPE: ICD-10-CM

## 2022-10-15 RX ORDER — ESZOPICLONE 2 MG/1
2 TABLET, FILM COATED ORAL AT BEDTIME
Qty: 90 TABLET | Refills: 1 | Status: SHIPPED | OUTPATIENT
Start: 2022-10-15 | End: 2023-04-13

## 2022-11-02 ENCOUNTER — OFFICE VISIT (OUTPATIENT)
Dept: FAMILY MEDICINE | Facility: CLINIC | Age: 67
End: 2022-11-02
Payer: COMMERCIAL

## 2022-11-02 ENCOUNTER — TRANSFERRED RECORDS (OUTPATIENT)
Dept: HEALTH INFORMATION MANAGEMENT | Facility: CLINIC | Age: 67
End: 2022-11-02

## 2022-11-02 VITALS
SYSTOLIC BLOOD PRESSURE: 134 MMHG | WEIGHT: 259.8 LBS | DIASTOLIC BLOOD PRESSURE: 78 MMHG | HEIGHT: 69 IN | BODY MASS INDEX: 38.48 KG/M2 | OXYGEN SATURATION: 94 % | HEART RATE: 88 BPM | TEMPERATURE: 98.5 F

## 2022-11-02 DIAGNOSIS — I10 ESSENTIAL HYPERTENSION, BENIGN: ICD-10-CM

## 2022-11-02 DIAGNOSIS — M25.561 CHRONIC PAIN OF RIGHT KNEE: ICD-10-CM

## 2022-11-02 DIAGNOSIS — N13.8 BPH WITH URINARY OBSTRUCTION: ICD-10-CM

## 2022-11-02 DIAGNOSIS — Z01.818 PRE-OP EVALUATION: Primary | ICD-10-CM

## 2022-11-02 DIAGNOSIS — G89.29 CHRONIC PAIN OF RIGHT KNEE: ICD-10-CM

## 2022-11-02 DIAGNOSIS — G47.33 OSA (OBSTRUCTIVE SLEEP APNEA): ICD-10-CM

## 2022-11-02 DIAGNOSIS — N40.1 BPH WITH URINARY OBSTRUCTION: ICD-10-CM

## 2022-11-02 LAB
ANION GAP SERPL CALCULATED.3IONS-SCNC: 13 MMOL/L (ref 7–15)
ATRIAL RATE - MUSE: 77 BPM
BUN SERPL-MCNC: 25 MG/DL (ref 8–23)
CALCIUM SERPL-MCNC: 9.8 MG/DL (ref 8.8–10.2)
CHLORIDE SERPL-SCNC: 99 MMOL/L (ref 98–107)
CREAT SERPL-MCNC: 1.13 MG/DL (ref 0.67–1.17)
DEPRECATED HCO3 PLAS-SCNC: 26 MMOL/L (ref 22–29)
DIASTOLIC BLOOD PRESSURE - MUSE: NORMAL MMHG
GFR SERPL CREATININE-BSD FRML MDRD: 72 ML/MIN/1.73M2
GLUCOSE SERPL-MCNC: 75 MG/DL (ref 70–99)
INTERPRETATION ECG - MUSE: NORMAL
P AXIS - MUSE: 55 DEGREES
POTASSIUM SERPL-SCNC: 4.5 MMOL/L (ref 3.4–5.3)
PR INTERVAL - MUSE: 182 MS
QRS DURATION - MUSE: 96 MS
QT - MUSE: 370 MS
QTC - MUSE: 418 MS
R AXIS - MUSE: -9 DEGREES
SODIUM SERPL-SCNC: 138 MMOL/L (ref 136–145)
SYSTOLIC BLOOD PRESSURE - MUSE: NORMAL MMHG
T AXIS - MUSE: 26 DEGREES
VENTRICULAR RATE- MUSE: 77 BPM

## 2022-11-02 PROCEDURE — 80048 BASIC METABOLIC PNL TOTAL CA: CPT | Performed by: FAMILY MEDICINE

## 2022-11-02 PROCEDURE — 93005 ELECTROCARDIOGRAM TRACING: CPT | Performed by: FAMILY MEDICINE

## 2022-11-02 PROCEDURE — 93010 ELECTROCARDIOGRAM REPORT: CPT | Performed by: INTERNAL MEDICINE

## 2022-11-02 PROCEDURE — 99214 OFFICE O/P EST MOD 30 MIN: CPT | Performed by: FAMILY MEDICINE

## 2022-11-02 PROCEDURE — 36415 COLL VENOUS BLD VENIPUNCTURE: CPT | Performed by: FAMILY MEDICINE

## 2022-11-02 ASSESSMENT — PAIN SCALES - GENERAL: PAINLEVEL: MODERATE PAIN (4)

## 2022-11-02 NOTE — PROGRESS NOTES
[address] [address]  Phone: [phone]  Fax: [phone]  Primary Provider: Aravind Villa  Pre-op Performing Provider: LISA GALVAN      PREOPERATIVE EVALUATION:  Today's date: 11/2/2022    Jay Roth is a 67 year old male who presents for a preoperative evaluation, undergoing aforementioned procedure for the treatment of chronic left knee pain.      Surgical Information:  Surgery/Procedure: Left knee replacement  Surgery Location: [facility] [place]  Surgeon: Dr. Ramos  Surgery Date: 11/7/22  Time of Surgery: TBD  Where patient plans to recover: At home with family  Fax number for surgical facility: [phone]    Type of Anesthesia Anticipated: to be determined    Assessment & Plan     The proposed surgical procedure is considered INTERMEDIATE risk.    Pre-op evaluation    - EKG 12-lead, tracing only  - Basic metabolic panel; Future  - Basic metabolic panel    Chronic pain of right knee      JIGNA (obstructive sleep apnea)      Benign Essential Hypertension  Normotensive     BPH with urinary obstruction         Medication Instructions:  continue all meds up to the day of the procedure, resume per surgeon / Hopitalist     RECOMMENDATION:  APPROVAL GIVEN to proceed with proposed procedure, without further diagnostic evaluation.    Review of the result(s) of each unique test - As noted               Subjective       Preop Questions 10/27/2022   1. Have you ever had a heart attack or stroke? No   2. Have you ever had surgery on your heart or blood vessels, such as a stent placement, a coronary artery bypass, or surgery on an artery in your head, neck, heart, or legs? No   3. Do you have chest pain with activity? No   4. Do you have a history of  heart failure? No   5. Do you currently have a cold, bronchitis or symptoms of other infection? No   6. Do you have a cough, shortness of breath, or wheezing? No   7. Do you  or anyone in your family have previous history of blood clots? No   8. Do you or does anyone in your family have a serious bleeding problem such as prolonged bleeding following surgeries or cuts? No   9. Have you ever had problems with anemia or been told to take iron pills? No   10. Have you had any abnormal blood loss such as black, tarry or bloody stools? No   11. Have you ever had a blood transfusion? No   12. Are you willing to have a blood transfusion if it is medically needed before, during, or after your surgery? Yes   13. Have you or any of your relatives ever had problems with anesthesia? No   14. Do you have sleep apnea, excessive snoring or daytime drowsiness? No   15. Do you have any artifical heart valves or other implanted medical devices like a pacemaker, defibrillator, or continuous glucose monitor? No   16. Do you have artificial joints? No   17. Are you allergic to latex? No         Review of Systems  Constitutional, neuro, ENT, endocrine, pulmonary, cardiac, gastrointestinal, genitourinary, musculoskeletal, integument and psychiatric systems are negative, except as otherwise noted.    Patient Active Problem List    Diagnosis Date Noted     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 37.0 to 37.9 in adult 10/08/2021     Priority: Medium     Serrated adenoma of colon 10/08/2021     Priority: Medium     BPH with urinary obstruction 10/08/2021     Priority: Medium     Urinary incontinence, unspecified type 10/08/2021     Priority: Medium     Obesity (BMI 35.0-39.9) with comorbidity (H) 11/11/2020     Priority: Medium     Emesis 01/23/2020     Priority: Medium     Abdominal cramping      Priority: Medium     Recurrent Clostridioides difficile diarrhea 12/31/2019     Priority: Medium     Benign Essential Hypertension      Priority: Medium     Created by Conversion         GALINDO (acute kidney injury) (H) 12/20/2019     Priority: Medium     Acute kidney injury (H)  12/20/2019     Priority: Medium     Nausea and vomiting, intractability of vomiting not specified, unspecified vomiting type 12/20/2019     Priority: Medium     Added automatically from request for surgery [id number]         Diverticulitis 12/05/2019     Priority: Medium     Non-recurrent unilateral inguinal hernia without obstruction or gangrene 11/27/2019     Priority: Medium     Added automatically from request for surgery [id number]         History of Bell's palsy 09/26/2019     Priority: Medium     Hyperbilirubinemia 09/26/2019     Priority: Medium     Acute diverticulitis 09/26/2019     Priority: Medium     Smokeless tobacco use      Priority: Medium     Created by Conversion         Hyperlipidemia LDL goal <100      Priority: Medium     Created by Conversion         Colon polyp 06/06/2018     Priority: Medium     Diverticulosis      Priority: Medium     Created by Conversion  Replacement Utility updated for latest IMO load         Insomnia 04/20/2015     Priority: Medium     JIGNA (obstructive sleep apnea)      Priority: Medium     Created by Conversion         Benign Prostatic Hypertrophy      Priority: Medium     Created by Conversion         Cerumen Impaction      Priority: Medium     Created by Conversion         Lower Back Pain      Priority: Medium     Created by Conversion         Hematuria      Priority: Medium     Created by Conversion          Past Medical History:   Diagnosis Date     GALINDO (acute kidney injury) (H)      BPH (benign prostatic hyperplasia)      Clostridium difficile colitis     h/o 10/19     Diverticulitis      GERD (gastroesophageal reflux disease)      H/O Bell's palsy      Hematuria      Hyperbilirubinemia      Hyperlipidemia      Hypertension      Insomnia      Lower back pain      Right inguinal hernia      Sleep apnea     Minor Sleep Apnea not CPAP     Past Surgical History:   Procedure Laterality Date     HC KNEE SCOPE,MED/LAT MENISCUS REPAIR      Description: Knee Arthroscopy With  Medial Meniscus Repair;  Recorded: 02/11/2009;     HERNIA REPAIR       VA ESOPHAGOGASTRODUODENOSCOPY TRANSORAL DIAGNOSTIC N/A 12/26/2019    Procedure: ESOPHAGOGASTRODUODENOSCOPY (EGD) with biopsies;  Surgeon: Tamir Serna MD;  Location: [facility];  Service: Gastroenterology     VA LAP,SURG,COLECTOMY, PARTIAL, W/ANAST N/A 12/5/2019    Procedure: LAPAROSCOPIC sIGMOIDECTOMY;  Surgeon: Isi Lyles MD;  Location: [facility] OR;  Service: General     SIGMOIDOSCOPY FLEXIBLE N/A 12/24/2019    Procedure: Flexible SIGMOIDOSCOPY;  Surgeon: Isi Lyles MD;  Location: [facility];  Service: Gastroenterology     SIGMOIDOSCOPY FLEXIBLE N/A 12/27/2019    Procedure: Flexible SIGMOIDOSCOPY with biopsy;  Surgeon: Isi Lyles MD;  Location: [facility];  Service: Gastroenterology     Current Outpatient Medications   Medication Sig Dispense Refill     amLODIPine (NORVASC) 5 MG tablet Take 1 tablet (5 mg) by mouth daily 90 tablet 3     pravastatin (PRAVACHOL) 20 MG tablet Take 1 tablet (20 mg) by mouth every evening 90 tablet 3     tamsulosin (FLOMAX) 0.4 MG capsule Take 2 capsules (0.8 mg) by mouth daily 180 capsule 3     augmented betamethasone dipropionate (DIPROLENE-AF) 0.05 % external ointment Apply topically sparingly to affected areas twice daily as needed (Patient not taking: Reported on 11/2/2022) 45 g 1     eszopiclone (LUNESTA) 2 MG tablet Take 1 tablet (2 mg) by mouth At Bedtime (Patient not taking: Reported on 11/2/2022) 90 tablet 1     fish oil-omega-3 fatty acids 1000 MG capsule Take 2 g by mouth daily (Patient not taking: Reported on 11/2/2022)       medical cannabis (Patient's own supply) [MEDICAL CANNABIS (PATIENT'S OWN SUPPLY)] by Other route see administration instructions. (The purpose of this order is to document that the patient reports taking medical cannabis. This is not a prescription, and is not used to certify that the patient has a  qualifying medical condition.)  "(Patient not taking: Reported on 2022)       multivitamin (ONE A DAY) per tablet [MULTIVITAMIN (ONE A DAY) PER TABLET] Take 1 tablet by mouth daily. (Patient not taking: Reported on 2022)         Allergies   Allergen Reactions     Atorvastatin Calcium [Atorvastatin] Dizziness        Social History     Tobacco Use     Smoking status: Former     Types: Cigarettes     Quit date: 1990     Years since quittin.8     Smokeless tobacco: Former     Types: Chew     Tobacco comments:     chew daily   Substance Use Topics     Alcohol use: Yes     Comment: Alcoholic Drinks/day: occasional       History   Drug Use     Comment: Drug use: medical cannabis         Objective     BP (!) 153/99 (BP Location: Right arm, Patient Position: Left side, Cuff Size: Adult Regular)   Pulse 88   Temp 98.5  F (36.9  C) (Oral)   Ht 1.753 m (5' 9\")   Wt 117.8 kg (259 lb 12.8 oz)   SpO2 94%   BMI 38.37 kg/m      Physical Exam    GENERAL APPEARANCE: healthy, alert and no distress     EYES: EOMI,  PERRL     HENT: nose and mouth without ulcers or lesions     NECK: no adenopathy, no asymmetry, masses, or scars and thyroid normal to palpation     RESP: lungs clear to auscultation - no rales, rhonchi or wheezes     CV: regular rates and rhythm, normal S1 S2, no S3 or S4 and no murmur, click or rub     ABDOMEN:  soft, nontender, no HSM or masses and bowel sounds normal     MS: extremities normal- no gross deformities noted, no evidence of inflammation in joints, FROM in all extremities.     SKIN: no suspicious lesions or rashes     NEURO: Normal strength and tone, sensory exam grossly normal, mentation intact and speech normal     PSYCH: mentation appears normal. and affect normal/bright     LYMPHATICS: No cervical adenopathy    Recent Labs   Lab Test 10/14/22  0733 10/08/21  0802 21  1303   HGB 16.9  --  16.5     --  160    141 138   POTASSIUM 4.6 4.9 4.4   CR 1.20* 1.25 1.21   A1C 5.0  --   --     "     Diagnostics:  Recent Results (from the past 48 hour(s))   EKG 12-lead, tracing only    Collection Time: 11/02/22 11:11 AM   Result Value Ref Range    Systolic Blood Pressure  mmHg    Diastolic Blood Pressure  mmHg    Ventricular Rate 77 BPM    Atrial Rate 77 BPM    ND Interval 182 ms    QRS Duration 96 ms     ms    QTc 418 ms    P Axis 55 degrees    R AXIS -9 degrees    T Axis 26 degrees    Interpretation ECG       Sinus rhythm  Normal ECG  When compared with ECG of 09-NOV-2020 13:48,  Premature atrial complexes are no longer Present  Confirmed by SELIN YATES MD LOC:JN ([id number]) on 11/2/2022 2:21:32 PM     Basic metabolic panel    Collection Time: 11/02/22 11:48 AM   Result Value Ref Range    Sodium 138 136 - 145 mmol/L    Potassium 4.5 3.4 - 5.3 mmol/L    Chloride 99 98 - 107 mmol/L    Carbon Dioxide (CO2) 26 22 - 29 mmol/L    Anion Gap 13 7 - 15 mmol/L    Urea Nitrogen 25.0 (H) 8.0 - 23.0 mg/dL    Creatinine 1.13 0.67 - 1.17 mg/dL    Calcium 9.8 8.8 - 10.2 mg/dL    Glucose 75 70 - 99 mg/dL    GFR Estimate 72 >60 mL/min/1.73m2        11/18/2020  NM MPI Treadmill          Result Text     There is no prior study for comparison.     The nuclear stress test is negative for inducible myocardial ischemia or infarction.     The patient is at a low risk of future cardiac ischemic events.     The left ventricular ejection fraction at rest is 74%. The left ventricular ejection fraction at stress is greater than 70%.     The patient's exercise capacity is average.  No chest pain with activity.     Left ventricular function is hyperdynamic.     Occasional premature ventricular contractions noted during stress and in recovery phase.      Revised Cardiac Risk Index (RCRI):  The patient has the following serious cardiovascular risks for perioperative complications:   - No serious cardiac risks = 0 points     RCRI Interpretation: 0 points: Class I (very low risk - 0.4% complication rate)           Signed  Electronically by: Sintia Robison MD  Copy of this evaluation report is provided to requesting physician.

## 2022-11-07 ENCOUNTER — TRANSFERRED RECORDS (OUTPATIENT)
Dept: HEALTH INFORMATION MANAGEMENT | Facility: CLINIC | Age: 67
End: 2022-11-07

## 2022-11-27 DIAGNOSIS — E78.5 HYPERLIPIDEMIA LDL GOAL <100: ICD-10-CM

## 2022-11-28 RX ORDER — PRAVASTATIN SODIUM 20 MG
20 TABLET ORAL EVERY EVENING
Qty: 90 TABLET | Refills: 3 | Status: SHIPPED | OUTPATIENT
Start: 2022-11-28 | End: 2023-12-04

## 2022-11-28 NOTE — TELEPHONE ENCOUNTER
"Last Written Prescription Date:  12/10/21  Last Fill Quantity: 90,  # refills: 3   Last office visit provider:  11/2/22     Requested Prescriptions   Pending Prescriptions Disp Refills     pravastatin (PRAVACHOL) 20 MG tablet [Pharmacy Med Name: PRAVASTATIN 20MG TABLETS] 90 tablet 3     Sig: TAKE 1 TABLET BY MOUTH EVERY EVENING       Statins Protocol Passed - 11/28/2022 11:30 AM        Passed - LDL on file in past 12 months     Recent Labs   Lab Test 10/14/22  0733   *             Passed - No abnormal creatine kinase in past 12 months     Recent Labs   Lab Test 12/20/19  1142   CKT 19*                Passed - Recent (12 mo) or future (30 days) visit within the authorizing provider's specialty     Patient has had an office visit with the authorizing provider or a provider within the authorizing providers department within the previous 12 mos or has a future within next 30 days. See \"Patient Info\" tab in inbasket, or \"Choose Columns\" in Meds & Orders section of the refill encounter.              Passed - Medication is active on med list        Passed - Patient is age 18 or older             Tamir Jimenez RN 11/28/22 11:30 AM  "

## 2022-11-29 ENCOUNTER — TRANSFERRED RECORDS (OUTPATIENT)
Dept: HEALTH INFORMATION MANAGEMENT | Facility: CLINIC | Age: 67
End: 2022-11-29

## 2022-12-02 DIAGNOSIS — I10 ESSENTIAL HYPERTENSION, BENIGN: ICD-10-CM

## 2022-12-02 RX ORDER — AMLODIPINE BESYLATE 5 MG/1
5 TABLET ORAL DAILY
Qty: 90 TABLET | Refills: 3 | Status: SHIPPED | OUTPATIENT
Start: 2022-12-02 | End: 2023-12-04

## 2022-12-02 NOTE — TELEPHONE ENCOUNTER
"Last Written Prescription Date:  12/9/21  Last Fill Quantity: 90,  # refills: 3   Last office visit provider:  11/2/22     Requested Prescriptions   Pending Prescriptions Disp Refills     amLODIPine (NORVASC) 5 MG tablet [Pharmacy Med Name: AMLODIPINE BESYLATE 5MG TABLETS] 90 tablet 3     Sig: TAKE 1 TABLET(5 MG) BY MOUTH DAILY       Calcium Channel Blockers Protocol  Passed - 12/2/2022 10:35 AM        Passed - Blood pressure under 140/90 in past 12 months     BP Readings from Last 3 Encounters:   11/02/22 134/78   10/13/22 136/82   10/08/21 134/86                 Passed - Recent (12 mo) or future (30 days) visit within the authorizing provider's specialty     Patient has had an office visit with the authorizing provider or a provider within the authorizing providers department within the previous 12 mos or has a future within next 30 days. See \"Patient Info\" tab in inbasket, or \"Choose Columns\" in Meds & Orders section of the refill encounter.              Passed - Medication is active on med list        Passed - Patient is age 18 or older        Passed - Normal serum creatinine on file in past 12 months     Recent Labs   Lab Test 11/02/22  1148   CR 1.13       Ok to refill medication if creatinine is low               Tamir Jimenez RN 12/02/22 10:35 AM  "

## 2022-12-02 NOTE — TELEPHONE ENCOUNTER
Medication Question or Refill    Contacts       Type Contact Phone/Fax    12/02/2022 10:14 AM CST Interface (Incoming) [id number] - [place], [place] - [address] AT [address] [phone]    12/02/2022 10:27 AM CST Phone (Incoming) Grzegorz Roth (Self) [phone]          What medication are you calling about (include dose and sig)?: amLODIPine (NORVASC) 5 MG tablet    Controlled Substance Agreement on file:   CSA -- Patient Level:    CSA: None found at the patient level.       Who prescribed the medication?: Dr. Villa    Do you need a refill? Yes: will be out of medication soon.    When did you use the medication last? N/A    Patient offered an appointment? No    Do you have any questions or concerns?  Yes: Patient is requesting for refill due to patient only have a few pills left and it is the weekend.    Preferred Pharmacy:   [id number] - [place], [place] - [address] AT [facility]  [address]  [place] [address]  Phone: [phone] Fax: [phone]      Could we send this information to you in [facility] or would you prefer to receive a phone call?:   Patient would prefer a phone call   Okay to leave a detailed message?: Yes at Cell number on file:    Telephone Information:   Mobile [phone]

## 2022-12-20 ENCOUNTER — TRANSFERRED RECORDS (OUTPATIENT)
Dept: HEALTH INFORMATION MANAGEMENT | Facility: CLINIC | Age: 67
End: 2022-12-20

## 2022-12-27 ENCOUNTER — VIRTUAL VISIT (OUTPATIENT)
Dept: FAMILY MEDICINE | Facility: CLINIC | Age: 67
End: 2022-12-27
Payer: COMMERCIAL

## 2022-12-27 DIAGNOSIS — U07.1 INFECTION DUE TO 2019 NOVEL CORONAVIRUS: Primary | ICD-10-CM

## 2022-12-27 DIAGNOSIS — R05.1 ACUTE COUGH: ICD-10-CM

## 2022-12-27 PROCEDURE — 99213 OFFICE O/P EST LOW 20 MIN: CPT | Mod: 95 | Performed by: FAMILY MEDICINE

## 2022-12-27 NOTE — PROGRESS NOTES
Grzegorz is a 67 year old who is being evaluated via a billable video visit.      How would you like to obtain your AVS? MyChart  If the video visit is dropped, the invitation should be resent by: Text to cell phone: [phone]  Will anyone else be joining your video visit? No      Infection due to 2019 novel coronavirus  He is positive for COVID and has not been sick long enough to know what the trend is for him although he seems to be having a pretty mild case.  He had a bout of COVID in June as well.  It took him about a week to get better from that.  Currently he seems to be doing well and we discussed putting off treatment with an antiviral for now and to just treat symptomatically.    Acute cough  Discussed that he can use over-the-counter medicine for any symptoms he has such as cough, body ache etc.    I did suggest that if he should suddenly seem to be worse in the next day or 2, they could let me know and he would still be in the window of time where he could have some treatment with Paxlovid.  I could then send a prescription if deemed necessary.  He can follow-up with his primary in his usual interval.        Subjective   Grzegorz is a 67 year old accompanied by his spouse, presenting for the following health issues:  No chief complaint on file.      HPI   Patient started with mild cold symptoms 2 days ago and last night decided to test for COVID and was positive.  He has runny nose, fatigue, mildly productive cough and did have some body aches and headache which has improved.  He is over 65 and does have hypertension, hyperlipidemia and obesity but is otherwise pretty healthy.  He has been fully vaccinated with his bivalent booster given 11/22/2022.    COVID-19 Symptom Review  How many days ago did these symptoms start? 2 days ago     Are any of the following symptoms significant for you?    New or worsening difficulty breathing? No    Worsening cough? Yes, I am coughing up mucus.    Fever or chills?  No    Headache: YES- slight     Sore throat: No    Chest pain: No    Diarrhea: No    Body aches? YES- slight     What treatments has patient tried?    Does patient live in a nursing home, group home, or shelter? No  Does patient have a way to get food/medications during quarantined? Yes, I have a friend or family member who can help me. and Yes         Objective           Vitals:  No vitals were obtained today due to virtual visit.    Physical Exam   GENERAL: alert, no distress and obese  EYES: Eyes grossly normal to inspection  HENT: normal cephalic/atraumatic and runny nose with frequent sniffling  RESP: Infrequent mildly productive cough, no wheezing, able to talk in full sentences  SKIN: Visible skin clear. No significant rash, abnormal pigmentation or lesions.  PSYCH: Mentation appears normal, affect normal/bright, judgement and insight intact, normal speech and appearance well-groomed.       Video-Visit Details    Type of service:  Video Visit   Video Start Time: 11:51 AM  Video End Time:12:06 PM    Originating Location (pt. Location): Home  Distant Location (provider location):  On-site  Platform used for Video Visit: Talon

## 2022-12-28 ENCOUNTER — E-VISIT (OUTPATIENT)
Dept: FAMILY MEDICINE | Facility: CLINIC | Age: 67
End: 2022-12-28
Payer: COMMERCIAL

## 2022-12-28 ENCOUNTER — VIRTUAL VISIT (OUTPATIENT)
Dept: FAMILY MEDICINE | Facility: CLINIC | Age: 67
End: 2022-12-28
Payer: COMMERCIAL

## 2022-12-28 ENCOUNTER — TELEPHONE (OUTPATIENT)
Dept: FAMILY MEDICINE | Facility: CLINIC | Age: 67
End: 2022-12-28

## 2022-12-28 DIAGNOSIS — Z20.822 SUSPECTED COVID-19 VIRUS INFECTION: Primary | ICD-10-CM

## 2022-12-28 DIAGNOSIS — U07.1 INFECTION DUE TO 2019 NOVEL CORONAVIRUS: Primary | ICD-10-CM

## 2022-12-28 PROCEDURE — 99214 OFFICE O/P EST MOD 30 MIN: CPT | Mod: 95 | Performed by: FAMILY MEDICINE

## 2022-12-28 PROCEDURE — 99207 PR NO BILLABLE SERVICE THIS VISIT: CPT | Performed by: PHYSICIAN ASSISTANT

## 2022-12-28 NOTE — PROGRESS NOTES
Grzegorz is a 67 year old who is being evaluated via a billable telephone visit.      What phone number would you like to be contacted at? [phone]  How would you like to obtain your AVS? Juan  Distant Location (provider location):  On-site    Assessment & Plan     Infection due to 2019 novel coronavirus  Patient has had active symptoms from 48 hours had a positive COVID-19 test done at home had a virtual visit done less than 48 hours ago symptoms have worsened he has more chest congestion headaches myalgia present no fever reported eating regularly.  Instructed patient about stopping his statin.  He is informed that if his symptoms become more pronounced or intense he needs to go to the emergency room.  Side effects of medication discussed in detail.  - nirmatrelvir and ritonavir (PAXLOVID) therapy pack; Take 3 tablets by mouth 2 times daily for 5 days (Take 2 Nirmatrelvir tablets and 1 Ritonavir tablet twice daily for 5 days)                 No follow-ups on file.    Stoney Perez MD  [facility]   Grzegorz is a 67 year old, presenting for the following health issues:  Covid treatment      HPI   67-year-old male with a history of hypertension here on a virtual visit because of a positive COVID-19 test and worsening symptoms.  Seen by Dr. Malcolm approximately 48 hours ago on an E-visit at that time was suggested to watch symptoms as he seemed to be improving since that time he has been feeling more lethargic has had chest congestion nasal congestion headache and body ache.  Denies a fever denies nausea and vomiting denies breathing problems or shortness of breath.        Review of Systems   Constitutional, HEENT, cardiovascular, pulmonary, gi and gu systems are negative, except as otherwise noted.      Objective           Vitals:  No vitals were obtained today due to virtual visit.    Physical Exam   healthy, alert and no distress  PSYCH: Alert and oriented times 3; coherent speech,  normal   rate and volume, able to articulate logical thoughts, able   to abstract reason, no tangential thoughts, no hallucinations   or delusions  His affect is normal  RESP: No cough, no audible wheezing, able to talk in full sentences  Remainder of exam unable to be completed due to telephone visits      Phone call duration: 30 minutes

## 2022-12-28 NOTE — TELEPHONE ENCOUNTER
RN COVID TREATMENT VISIT  12/28/22    Jay Roth  67 year old  Current weight? 250    Has the patient been seen by a primary care provider at an [facility] or [facility] within the past two years? Yes.   Have you been in close proximity to/do you have a known exposure to a person with a confirmed case of influenza? No.     Date of positive COVID test (PCR or at home)?  12/26    Current COVID symptoms: fever or chills, cough, muscle or body aches, headache, sore throat and congestion or runny nose    Date COVID symptoms began: 12/26    Do you have any of the following conditions that place you at risk of being very sick from COVID-19? 65 years or older    Is patient eligible to continue? Yes, established patient, 12 years or older weighing at least 88.2 lbs, who has COVID symptoms that started in the past 5 days and is at risk for being very sick from COVID-19.       Have you received monoclonal antibodies or oral antiviral medications since testing positive to COVID-19? No    Are you currently hospitalized for COVID-19? No    Do you have a history of hepatitis? No    Are you currently pregnant or nursing? No    Do you have a clinically significant hypersensitivity to nirmatrelvir, ritonavir, or molnupiravir? No    Do you have any history of severe renal impairment (eGFR < 30mL/min)? No    Do you have any history of hepatic impairment or abnormalities (e.g. hepatic panel, ALT, AST, ALK Phos, bilirubin)? No    Have you had a coronary stent placed in the previous 6 months? No    Is patient eligible to continue?   Yes, patient meets all eligibility requirements for the RN COVID treatment (as denoted by all no responses above).     Current Outpatient Medications   Medication Sig Dispense Refill     amLODIPine (NORVASC) 5 MG tablet Take 1 tablet (5 mg) by mouth daily 90 tablet 3     augmented betamethasone dipropionate (DIPROLENE-AF) 0.05 % external ointment Apply topically sparingly to  affected areas twice daily as needed 45 g 1     eszopiclone (LUNESTA) 2 MG tablet Take 1 tablet (2 mg) by mouth At Bedtime 90 tablet 1     fish oil-omega-3 fatty acids 1000 MG capsule Take 2 g by mouth daily       medical cannabis (Patient's own supply) by Other route See Admin Instructions       multivitamin (ONE A DAY) per tablet Take 1 tablet by mouth daily       pravastatin (PRAVACHOL) 20 MG tablet Take 1 tablet (20 mg) by mouth every evening 90 tablet 3     tamsulosin (FLOMAX) 0.4 MG capsule Take 2 capsules (0.8 mg) by mouth daily 180 capsule 3       Medications from List 1 of the standing order (on medications that exclude the use of Paxlovid) that patient is taking: NONE. Is patient taking Rachelle's Wort? No  Is patient taking [place]'s Wort or any meds from List 1? No.   Medications from List 2 of the standing order (on meds that provider needs to adjust) that patient is taking: amlodipine (Norvasc), explained a provider visit is necessary to discuss medication adjustments while taking Paxlovid. Is patient on any of the meds from List 2? Yes. Patient will make an e-visit appt through [email or web address]    Instructed pt on home care advice. He has a pulse oximeter and will start checking his O2 sats 4 times daily. Instructed pt to go to ED if O2 sats drop below 90% and stay there for 10 minutes.   Louisa Cr RN

## 2022-12-28 NOTE — PATIENT INSTRUCTIONS
Dear Grzegorz,      Based on your responses, you may have COVID-19.     Will I be tested for COVID-19?  We would like to test you for COVID. I have placed orders for this test.     To schedule: go to your "[employer] home page and scroll down to the section that says  You have an appointment that needs to be scheduled  and click the large green button that says  Schedule Now  and follow the steps to find the next available openings.    If you are unable to complete these "[employer] scheduling steps, please call [phone] to schedule your testing.     How do I self-isolate?  You isolate when you have symptoms of COVID or a test shows you have COVID, even if you don t have symptoms.     If you DO have symptoms:  o Stay home and away from others  - For at least 5 days after your symptoms started, AND   - You are fever free for 24 hours (with no medicine that reduces fever), AND  - Your other symptoms are better.  o Wear a mask for 10 full days any time you are around others.    If you DON T have symptoms:  o Stay at home and away from others for at least 5 days after your positive test.  o Wear a mask for 10 full days any time you are around others.    How can I take care of myself?  Over the counter medications may help with your symptoms such as runny or stuffy nose, cough, chills, or fever.  Talk to your care team about your options.     Some people are at high risk of severe illness (for example, you have a weak immune system, you re 65 years or older, or you have certain medical problems). If your risk is high and your symptoms started in the last 5 days, we strongly recommend for you to get COVID treatment as soon as possible. Paxlovid and Molnupiravir are proven safe and effective, make you feel better faster, and prevent hospitalization and death.       To schedule an appointment to discuss COVID treatment, request an appointment on "[employer] (select  COVID-19 Treatment ) or call [facility] ([phone]).      Get  lots of rest. Drink extra fluids (unless a doctor has told you not to)    Take Tylenol (acetaminophen) or ibuprofen for fever or pain. If you have liver or kidney problems, ask your family doctor if it's okay to take Tylenol or ibuprofen    Take over the counter medications for your symptoms, as directed by your doctor. You may also talk to your pharmacist.      If you have other health problems (like cancer, heart failure, an organ transplant or severe kidney disease): Call your specialty clinic if you don't feel better in the next 2 days.    Know when to call 911. Emergency warning signs include:  o Trouble breathing or shortness of breath  o Pain or pressure in the chest that doesn't go away  o Feeling confused like you haven't felt before, or not being able to wake up  o Bluish-colored lips or face    Where can I get more information?    [facility] - About COVID-19: [email or web address]     CDC - What to Do If You're Sick: https://www.cdc.gov/coronavirus/2019-ncov/if-you-are-sick/index.html     CDC -  Isolation https://www.cdc.gov/coronavirus/2019-ncov/your-health/isolation.html  December 28, 2022  RE:  Jay Roth                                                                                                                  [address] [place] [address]      To whom it may concern:    I evaluated Jay Roth on December 28, 2022. Jay Roth should be excused from work/school.     They should let their workplace manager and staffing office know when their quarantine ends.    We can not give an exact date as it depends on the information below. They can calculate this on their own or work with their manager/staffing office to calculate this. (For example if they were exposed on 10/04, they would have to quarantine for 14 full days. That would be through 10/18. They could return on 10/19.)    Quarantine Guidelines:    If patient receives a positive COVID-19 test  result, they should follow the guidance of those who are giving the results. Usually the return to work is 10 (or in some cases 20 days from symptom onset.) If they work at [employer], they must be cleared by Employee Occupational Health and Safety to return to work.      If patient receives a negative COVID-19 test result and did not have a high risk exposure to someone with a known positive COVID-19 test, they can return to work once they're free of fever for 24 hours without fever-reducing medication and their symptoms are improving or resolved.    If patient receives a negative COVID-19 test and if they had a high risk exposure to someone who has tested positive for COVID-19 then they can return to work 14 days after their last contact with the positive individual    Note: If there is ongoing exposure to the covid positive person, this quarantine period may be longer than 14 days. (For example, if they are continually exposed to their child, who tested positive and cannot isolate from them, then the quarantine of 7-14 days can't start until their child is no longer contagious. This is typically 10 days from onset to the child's symptoms. So the total duration may be 17-24 days in this case.)     Sincerely,  Abbey Hoyos PA-C

## 2023-01-26 ENCOUNTER — TRANSFERRED RECORDS (OUTPATIENT)
Dept: HEALTH INFORMATION MANAGEMENT | Facility: CLINIC | Age: 68
End: 2023-01-26
Payer: COMMERCIAL

## 2023-02-16 ENCOUNTER — OFFICE VISIT (OUTPATIENT)
Dept: FAMILY MEDICINE | Facility: CLINIC | Age: 68
End: 2023-02-16
Payer: COMMERCIAL

## 2023-02-16 ENCOUNTER — ANCILLARY PROCEDURE (OUTPATIENT)
Dept: GENERAL RADIOLOGY | Facility: CLINIC | Age: 68
End: 2023-02-16
Attending: FAMILY MEDICINE
Payer: COMMERCIAL

## 2023-02-16 VITALS
HEART RATE: 70 BPM | SYSTOLIC BLOOD PRESSURE: 147 MMHG | OXYGEN SATURATION: 95 % | TEMPERATURE: 98.1 F | RESPIRATION RATE: 17 BRPM | DIASTOLIC BLOOD PRESSURE: 90 MMHG

## 2023-02-16 DIAGNOSIS — M94.0 COSTOCHONDRITIS: Primary | ICD-10-CM

## 2023-02-16 DIAGNOSIS — I49.1 PREMATURE ATRIAL CONTRACTION: ICD-10-CM

## 2023-02-16 DIAGNOSIS — R07.81 RIB PAIN: ICD-10-CM

## 2023-02-16 DIAGNOSIS — R07.9 CHEST PAIN, UNSPECIFIED TYPE: ICD-10-CM

## 2023-02-16 DIAGNOSIS — N17.9 ACUTE KIDNEY INJURY (H): ICD-10-CM

## 2023-02-16 LAB
ATRIAL RATE - MUSE: 60 BPM
DIASTOLIC BLOOD PRESSURE - MUSE: NORMAL MMHG
INTERPRETATION ECG - MUSE: NORMAL
P AXIS - MUSE: -4 DEGREES
PR INTERVAL - MUSE: 190 MS
QRS DURATION - MUSE: 106 MS
QT - MUSE: 392 MS
QTC - MUSE: 392 MS
R AXIS - MUSE: -16 DEGREES
SYSTOLIC BLOOD PRESSURE - MUSE: NORMAL MMHG
T AXIS - MUSE: 7 DEGREES
VENTRICULAR RATE- MUSE: 60 BPM

## 2023-02-16 PROCEDURE — 93010 ELECTROCARDIOGRAM REPORT: CPT | Performed by: INTERNAL MEDICINE

## 2023-02-16 PROCEDURE — 93005 ELECTROCARDIOGRAM TRACING: CPT | Performed by: FAMILY MEDICINE

## 2023-02-16 PROCEDURE — 99215 OFFICE O/P EST HI 40 MIN: CPT | Performed by: FAMILY MEDICINE

## 2023-02-16 PROCEDURE — 71111 X-RAY EXAM RIBS/CHEST4/> VWS: CPT | Mod: TC | Performed by: RADIOLOGY

## 2023-02-16 RX ORDER — CYCLOBENZAPRINE HCL 5 MG
5 TABLET ORAL
Qty: 30 TABLET | Refills: 0 | Status: SHIPPED | OUTPATIENT
Start: 2023-02-16 | End: 2023-10-17

## 2023-02-16 NOTE — PROGRESS NOTES
ASSESSMENT/PLAN:      ICD-10-CM    1. Costochondritis  M94.0 predniSONE (DELTASONE) 20 MG tablet    secondary to overuse injury -weights used on work out machine, xray negative for fracture, called patient and sent in script for prednisone       2. Rib pain  R07.81 XR Ribs and Chest Bilateral G/E 4 Views     cyclobenzaprine (FLEXERIL) 5 MG tablet    xray neg for fracture       3. Chest pain, unspecified type  R07.9 EKG 12-lead, tracing only    cxr negative, could reproduce the pain with palpation of anterior chest, hx of overuse/strain using workout machine,       4. Premature atrial contraction  I49.1     noted on ekg today, oherwise normal ekg per final reading       5. Acute kidney injury (H)  N17.9 predniSONE (DELTASONE) 20 MG tablet    2/2020 due to c. difficile, admitted to inpatient for a total of 45 days                Reviewed medication instructions and side effects. Follow up if experiences side effects.     I reviewed supportive care, otc meds to use if needed, expected course, and signs of concern.  Follow up as needed or if he does not improve within  1-2 days or if worsens in any way.  Reviewed red flag symptoms and is to go to the ER if experiences any of these.     The use of Dragon/The [employer] Times dictation services may have been used to construct the content in this note; any grammatical or spelling errors are non-intentional. Please contact the author of this note directly if you are in need of any clarification.      On the day of the encounter, time spend on chart review, patient visit, review of testing, documentation was 40  minutes          Patient Instructions     For pain    Acetaminophen (Tylenol ) 1000 mg 3 times a day for the next 5 to 7 days until pain resolves-maximum dose of acetaminophen (tylenol)  is 3000 mg in 24-hours     Start flexeril at bedtime-muscle relaxant -will make you sleepy, take second dose if wake up in pain        Will call you with results of xray, if negative will   do a short course of prednisone       see phone encounter 2/18/2023   Xray of ribs/chest xray negative, called patient and sent in script for prednisone and apologized for the delay in my response         Patient presents with:  right chest pain : Since yesterday        Subjective     Jay Roth is a 67 year old male who presents to clinic today for the following health issues:    HPI       Chest Pain-r lateral chest      Onset: started yesterday afternoon, and unable to sleep due to pain    Took oxycodone-med from previous surgery, pain improved     Description (location/character/radiation/duration): noticed some pulling in left side of chest, when doing workout at [employer] using a machine set weight for him pushing bar upward while sitting     Intensity:  6-7 /10, now 2/10 after oxycodone -drove 5 hours after taking medication    Accompanying signs and symptoms:        Shortness of breath: no        Sweating: no        Nausea/vomitting: no        Palpitations: no        Other (fevers/chills/cough/heartburn/lightheadedness): no - no fever/chills. Cough, no congestion, no wheezing     History (similar episodes/previous evaluation): no hx on right, pleurisy in past on left , but not having pain with a deep breath     Precipitating or alleviating factors:       Worse with exertion: no        Worse with breathing: no        Related to eating: no        Better with burping: no     Therapies tried and outcome: oxycodone today, tylenol couple times a night,     GALINDO due to severe C difficile, admitted for total of 45 days   Hx of tob abuse, quit -1990-somked for about 15 years   Chewing tobacco daily since   2019     Past Medical History:   Diagnosis Date     GALINDO (acute kidney injury) (H)      BPH (benign prostatic hyperplasia)      Clostridium difficile colitis     h/o 10/19     Diverticulitis      GERD (gastroesophageal reflux disease)      H/O Bell's palsy      Hematuria      Hyperbilirubinemia      Hyperlipidemia       Hypertension      Insomnia      Lower back pain      Right inguinal hernia      Sleep apnea     Minor Sleep Apnea not CPAP     Social History     Tobacco Use     Smoking status: Former     Types: Cigarettes     Quit date: 1990     Years since quittin.2     Smokeless tobacco: Former     Types: Chew     Tobacco comments:     chew daily   Vaping Use     Vaping status: Not on file   Substance Use Topics     Alcohol use: Yes     Comment: Alcoholic Drinks/day: occasional       Current Outpatient Medications   Medication Sig Dispense Refill     amLODIPine (NORVASC) 5 MG tablet Take 1 tablet (5 mg) by mouth daily 90 tablet 3     augmented betamethasone dipropionate (DIPROLENE-AF) 0.05 % external ointment Apply topically sparingly to affected areas twice daily as needed 45 g 1     cyclobenzaprine (FLEXERIL) 5 MG tablet Take 1 tablet (5 mg) by mouth at bedtime as needed, may repeat once for muscle spasms 30 tablet 0     eszopiclone (LUNESTA) 2 MG tablet Take 1 tablet (2 mg) by mouth At Bedtime 90 tablet 1     fish oil-omega-3 fatty acids 1000 MG capsule Take 2 g by mouth daily       medical cannabis (Patient's own supply) by Other route See Admin Instructions       multivitamin (ONE A DAY) per tablet Take 1 tablet by mouth daily       pravastatin (PRAVACHOL) 20 MG tablet Take 1 tablet (20 mg) by mouth every evening 90 tablet 3     tamsulosin (FLOMAX) 0.4 MG capsule Take 2 capsules (0.8 mg) by mouth daily 180 capsule 3     Allergies   Allergen Reactions     Atorvastatin Calcium [Atorvastatin] Dizziness         ROS are negative, except as otherwise noted HPI      Objective    BP (!) 147/90   Pulse 70   Temp 98.1  F (36.7  C)   Resp 17   SpO2 95%   There is no height or weight on file to calculate BMI.  Physical Exam   GENERAL:  alert and mild  distress  EYES: Eyes grossly normal to inspection, PERRL and conjunctivae and sclerae normal  HENT: ear canals clear  and TM's normal, nose and mouth without ulcers or  lesions, posterior pharynx clear  NECK: no adenopathy, no asymmetry, masses, or scars and thyroid normal to palpation  RESP: lungs clear to auscultation - no rales, rhonchi or wheeze  Chest-tender to palpation across anterior chest wall R>L, tender to palpation r mid lateral ribs,no crepitus, reproduces pain, no increase in pain with deep inspiration   CV: regular rate and rhythm, click or rub, no peripheral edema and peripheral pulses strong  MS: no gross musculoskeletal defects noted, no edema  NEURO: Normal strength and tone, mentation intact and speech normal, normal gait      Diagnostic Test Results:  Labs reviewed in Epic  Results for orders placed or performed in visit on 02/16/23   XR Ribs and Chest Bilateral G/E 4 Views     Status: None    Narrative    EXAM: XR RIBS AND CHEST BILATERAL G/E 4 VIEWS  LOCATION: [facility]  DATE/TIME: 2/16/2023 2:22 PM    INDICATION:  Rib pain  COMPARISON: None.      Impression    IMPRESSION: The visualized heart and lungs are negative. No rib fractures.   Results for orders placed or performed in visit on 02/16/23   EKG 12-lead, tracing only     Status: None   Result Value Ref Range    Systolic Blood Pressure  mmHg    Diastolic Blood Pressure  mmHg    Ventricular Rate 60 BPM    Atrial Rate 60 BPM    MD Interval 190 ms    QRS Duration 106 ms     ms    QTc 392 ms    P Axis -4 degrees    R AXIS -16 degrees    T Axis 7 degrees    Interpretation ECG       Sinus rhythm with Premature atrial complexes  Otherwise normal ECG  When compared with ECG of 02-NOV-2022 11:11,  Premature atrial complexes are now Present  Confirmed by MADHAVI RYDER MD LOC:[facility] ([id number]) on 2/16/2023 2:15:50 PM

## 2023-02-16 NOTE — PATIENT INSTRUCTIONS
For pain    Acetaminophen (Tylenol ) 1000 mg 3 times a day for the next 5 to 7 days until pain resolves-maximum dose of acetaminophen (tylenol)  is 3000 mg in 24-hours     Start flexeril at bedtime-muscle relaxant -will make you sleepy, take second dose if wake up in pain        Will call you with results of xray, if negative will  do a short course of prednisone

## 2023-02-17 ENCOUNTER — TELEPHONE (OUTPATIENT)
Dept: FAMILY MEDICINE | Facility: CLINIC | Age: 68
End: 2023-02-17
Payer: COMMERCIAL

## 2023-02-17 NOTE — TELEPHONE ENCOUNTER
Patient was told he would be prescribed prednisone depending on x ray results. Please call patient. Would [facility] to [facility] [place].

## 2023-02-18 ENCOUNTER — TELEPHONE (OUTPATIENT)
Dept: NURSING | Facility: CLINIC | Age: 68
End: 2023-02-18
Payer: COMMERCIAL

## 2023-02-18 RX ORDER — PREDNISONE 20 MG/1
20 TABLET ORAL DAILY
Qty: 5 TABLET | Refills: 0 | Status: SHIPPED | OUTPATIENT
Start: 2023-02-18 | End: 2023-02-23

## 2023-02-18 NOTE — TELEPHONE ENCOUNTER
Not recommended to send oral steroids at this time.  If symptoms not improving in 1 week, patient should follow-up with primary care and can reconsider treatments at that time.

## 2023-02-18 NOTE — TELEPHONE ENCOUNTER
"Pt follows up on chest X-ray results and Rx for prednisone sent to [facility].    Per AVS 2/13/23 Walk in Care visit:  \" For pain   Acetaminophen (Tylenol ) 1000 mg 3 times a day for the next 5 to 7 days until pain resolves-maximum dose of acetaminophen (tylenol)  is 3000 mg in 24-hours    Start flexeril at bedtime-muscle relaxant -will make you sleepy, take second dose if wake up in pain     Will call you with results of xray, if negative will  do a short course of prednisone - Dr. Marleni Taylor.\"      Per CXR results 2/16/23:  \"Dear Grzegorz,      Here are your recent results which are within the expected range. Xray is normal.   Please continue with your current plan of care and let us know if you have any questions or concerns.   Regards,  Marleni Taylor MD\"    Kristen Martinez RN/[place] Nurse Advisor        "

## 2023-02-18 NOTE — TELEPHONE ENCOUNTER
[employer] Access Code: Activation code not generated  Current [employer] Status: Patient Declined    Your email address is not on file at [email or web address].  Email Addresses are required for you to sign up for [employer], please contact [phone] to verify your personal information and to provide your email address prior to attempting to register for [employer].    Ashleigh Marquis  [address] [place] [address]    [employer]  A secure, online tool to manage your health information     [email or web address]’s [employer]® is a secure, online tool that connects you to your personalized health information from the privacy of your home -- day or night - making it very easy for you to manage your healthcare. Once the activation process is completed, you can even access your medical information using the [employer] kiel, which is available for free in the Apple Kiel store or Google Play store.     To learn more about [employer], visit [email or web address]    There are two levels of access available (as shown below):   My Chart Features  [facility] Primary Care Doctor [facility]  Specialists [facility]  Urgent  Care Non-[facility] Primary Care Doctor   Email your healthcare team securely and privately 24/7 X X X    Manage appointments: schedule your next appointment; view details of past/upcoming appointments X      Request prescription refills. X      View recent personal medical records, including lab and immunizations X X X X   View health record, including health history, allergies, medications X X X X   Read reports about your outpatient visits, procedures, consult and ER notes X X X X   See your discharge summary, which is a recap of your hospital and/or ER visit that includes your diagnosis, lab results, and care plan X X  X     How to register for [employer]:  Once your e-mail address has been verified, follow the following steps to sign up for [employer].     1. Go to  [email or web address]  2. Click on the Sign Up Now box, which takes you to the New  FNA reviewed chart, Rx for prednisone sent by Dr. Taylor.    See 2/17 telephone encounter.    FNA called pt and advised to call clinic on Wed/Thu if symptoms do not clear up. Call sooner for further concerns.    Pt verbalized agreement.    Kristen Martinez RN/[place] Nurse Advisor     Member Sign Up page. You will need to provide the following information:  a. Enter your [employer] Access Code exactly as it appears at the top of this page. (You will not need to use this code after you’ve completed the sign-up process. If you do not sign up before the expiration date, you must request a new code.)   b. Enter your date of birth.   c. Enter your home email address.   d. Click Submit, and follow the next screen’s instructions.  3. Create a [employer] ID. This will be your [employer] login ID and cannot be changed, so think of one that is secure and easy to remember.  4. Create a [employer] password. You can change your password at any time.  5. Enter your Password Reset Question and Answer. This can be used at a later time if you forget your password.   6. Enter your e-mail address. This allows you to receive e-mail notifications when new information is available in [employer].  7. Click Sign Up. You can now view your health information.    For assistance activating your [employer] account, call [phone]

## 2023-02-20 ENCOUNTER — OFFICE VISIT (OUTPATIENT)
Dept: FAMILY MEDICINE | Facility: CLINIC | Age: 68
End: 2023-02-20
Payer: COMMERCIAL

## 2023-02-20 VITALS
BODY MASS INDEX: 38.25 KG/M2 | DIASTOLIC BLOOD PRESSURE: 93 MMHG | WEIGHT: 259 LBS | SYSTOLIC BLOOD PRESSURE: 166 MMHG | RESPIRATION RATE: 16 BRPM | HEART RATE: 86 BPM | OXYGEN SATURATION: 95 %

## 2023-02-20 DIAGNOSIS — M54.6 ACUTE RIGHT-SIDED THORACIC BACK PAIN: Primary | ICD-10-CM

## 2023-02-20 LAB
ALBUMIN SERPL-MCNC: 4.2 G/DL (ref 3.5–5)
ALBUMIN UR-MCNC: >=300 MG/DL
ALP SERPL-CCNC: 59 U/L (ref 45–120)
ALT SERPL W P-5'-P-CCNC: 25 U/L (ref 0–45)
ANION GAP SERPL CALCULATED.3IONS-SCNC: 11 MMOL/L (ref 5–18)
APPEARANCE UR: CLEAR
AST SERPL W P-5'-P-CCNC: 20 U/L (ref 0–40)
BACTERIA #/AREA URNS HPF: ABNORMAL /HPF
BILIRUB SERPL-MCNC: 0.8 MG/DL (ref 0–1)
BILIRUB UR QL STRIP: NEGATIVE
BUN SERPL-MCNC: 17 MG/DL (ref 8–22)
CALCIUM SERPL-MCNC: 9.8 MG/DL (ref 8.5–10.5)
CHLORIDE BLD-SCNC: 101 MMOL/L (ref 98–107)
CO2 SERPL-SCNC: 23 MMOL/L (ref 22–31)
COLOR UR AUTO: YELLOW
CREAT SERPL-MCNC: 1.05 MG/DL (ref 0.7–1.3)
D DIMER PPP FEU-MCNC: 0.43 UG/ML FEU (ref 0–0.5)
ERYTHROCYTE [DISTWIDTH] IN BLOOD BY AUTOMATED COUNT: 12.9 % (ref 10–15)
GFR SERPL CREATININE-BSD FRML MDRD: 79 ML/MIN/1.73M2
GLUCOSE BLD-MCNC: 115 MG/DL (ref 70–125)
GLUCOSE UR STRIP-MCNC: NEGATIVE MG/DL
HCT VFR BLD AUTO: 52.8 % (ref 40–53)
HGB BLD-MCNC: 18 G/DL (ref 13.3–17.7)
HGB UR QL STRIP: ABNORMAL
KETONES UR STRIP-MCNC: NEGATIVE MG/DL
LEUKOCYTE ESTERASE UR QL STRIP: NEGATIVE
MCH RBC QN AUTO: 28.8 PG (ref 26.5–33)
MCHC RBC AUTO-ENTMCNC: 34.1 G/DL (ref 31.5–36.5)
MCV RBC AUTO: 85 FL (ref 78–100)
NITRATE UR QL: NEGATIVE
PH UR STRIP: 7 [PH] (ref 5–8)
PLATELET # BLD AUTO: 193 10E3/UL (ref 150–450)
POTASSIUM BLD-SCNC: 4.5 MMOL/L (ref 3.5–5)
PROT SERPL-MCNC: 7.9 G/DL (ref 6–8)
RBC # BLD AUTO: 6.25 10E6/UL (ref 4.4–5.9)
RBC #/AREA URNS AUTO: ABNORMAL /HPF
SODIUM SERPL-SCNC: 135 MMOL/L (ref 136–145)
SP GR UR STRIP: 1.02 (ref 1–1.03)
SQUAMOUS #/AREA URNS AUTO: ABNORMAL /LPF
TROPONIN I SERPL-MCNC: 0.01 NG/ML (ref 0–0.29)
UROBILINOGEN UR STRIP-ACNC: 0.2 E.U./DL
WBC # BLD AUTO: 5.8 10E3/UL (ref 4–11)
WBC #/AREA URNS AUTO: ABNORMAL /HPF

## 2023-02-20 PROCEDURE — 36415 COLL VENOUS BLD VENIPUNCTURE: CPT | Performed by: PHYSICIAN ASSISTANT

## 2023-02-20 PROCEDURE — 99215 OFFICE O/P EST HI 40 MIN: CPT | Performed by: PHYSICIAN ASSISTANT

## 2023-02-20 PROCEDURE — 85379 FIBRIN DEGRADATION QUANT: CPT | Performed by: PHYSICIAN ASSISTANT

## 2023-02-20 PROCEDURE — 81001 URINALYSIS AUTO W/SCOPE: CPT | Performed by: PHYSICIAN ASSISTANT

## 2023-02-20 PROCEDURE — 84484 ASSAY OF TROPONIN QUANT: CPT | Performed by: PHYSICIAN ASSISTANT

## 2023-02-20 PROCEDURE — 80053 COMPREHEN METABOLIC PANEL: CPT | Performed by: PHYSICIAN ASSISTANT

## 2023-02-20 PROCEDURE — 85027 COMPLETE CBC AUTOMATED: CPT | Performed by: PHYSICIAN ASSISTANT

## 2023-02-20 RX ORDER — PREDNISONE 20 MG/1
40 TABLET ORAL DAILY
Qty: 10 TABLET | Refills: 0 | Status: SHIPPED | OUTPATIENT
Start: 2023-02-20 | End: 2023-02-25

## 2023-02-20 ASSESSMENT — PAIN SCALES - GENERAL: PAINLEVEL: MILD PAIN (3)

## 2023-02-20 NOTE — PROGRESS NOTES
Patient presents with:  Back Pain: Had chest pain last time was seen, started the Prednisone and now the pain has moved to the upper back on the right side. Feels coming on when the Prednisone wears off      Clinical Decision Making:  Patient had previously had chest pain, the chest pain has resolved, but moved towards the right side of the back.  Prednisone seems to be helping, but it wears off in the middle of the night.  No known injuries.  Low suspicion for cardiac involvement given location of patient's pain, but I will complete some cardiac work-up for reassurance.  Troponin 1 was WNL today.  CMP shows normal kidney and liver function and slightly low sodium level.  CBC is not indicative of anemia or infection.  Urine test was completed to evaluate for blood content.  Trace blood not highly suspicious for nephrolithiasis.  D-dimer still in process to rule out PE.  Patient was instructed in topical pain relievers, massage, and continue Tylenol for musculoskeletal pain relief.  Patient was also given paper prescription for another round of prednisone in the case that is necessary.      ICD-10-CM    1. Acute right-sided thoracic back pain  M54.6 UA macro with reflex to Microscopic and Culture - Clinc Collect     Urine Microscopic     D dimer, quantitative     CBC with platelets     Troponin I     Comprehensive metabolic panel (BMP + Alb, Alk Phos, ALT, AST, Total. Bili, TP)     predniSONE (DELTASONE) 20 MG tablet     D dimer, quantitative     CBC with platelets     Troponin I     Comprehensive metabolic panel (BMP + Alb, Alk Phos, ALT, AST, Total. Bili, TP)          Patient Instructions   1. You will be notified of your labs via [email or web address].   2. Apply heating pad for 20 min at a time.   3. Apply topical pain relievers such as Aspercreme, Voltaren gel, or IcyHot.    4. Continue with prednisone.  5. You can also continue with Tylenol      HPI:  Jay Roth is a 67 year old male who presents today complaining of  chest pain that started on 12/15/2023.  After pain onset patient took oxycodone and his pain improved.  Patient noticed a pulling sensation on the left side of the chest when doing a pushing exercise at the [facility].  Pain was previously evaluated in urgent care on 2/16/2023.  At that time intensity was 6-7 out of 10.  Patient denies any shortness of breath, sweating, nausea, vomiting, palpitations, fevers, or pain on deep breathing.  During his last visit he had EKG that shows normal sinus rhythm with PACs.  Patient had bilateral rib and chest x-ray which were normal to rule out fractures.  He was discharged on Flexeril and prednisone. Pain is now on the right side of the back and is intermittent and throbbing. The chest pain is now gone. He denies any pleuritic CP. It's worse with pushing on the area.     History obtained from the patient.    Problem List:  2021-10: Class 2 drug-induced obesity without serious comorbidity   with body mass index (BMI) of 37.0 to 37.9 in adult  2021-10: Serrated adenoma of colon  2021-10: BPH with urinary obstruction  2021-10: Urinary incontinence, unspecified type  2020-11: Obesity (BMI 35.0-39.9) with comorbidity (H)  2020-01: Severe protein-calorie malnutrition (H)  2020-01: Emesis  2019-12: Recurrent Clostridioides difficile diarrhea  2019-12: Moderate protein-calorie malnutrition (H)  2019-12: GALINDO (acute kidney injury) (H)  2019-12: Acute kidney injury (H)  2019-12: Nausea and vomiting, intractability of vomiting not   specified, unspecified vomiting type  2019-12: Diverticulitis  2019-11: Non-recurrent unilateral inguinal hernia without obstruction   or gangrene  2019-09: History of Bell's palsy  2019-09: Hyperbilirubinemia  2019-09: Acute diverticulitis  2018-06: Colon polyp  2015-04: Insomnia  Smokeless tobacco use  Hyperlipidemia LDL goal <100  Benign Prostatic Hypertrophy  Cerumen Impaction  Lower Back Pain  Diverticulosis  Hematuria  JIGNA (obstructive sleep apnea)  Benign  Essential Hypertension  Severe malnutrition (H)  Abdominal cramping      Past Medical History:   Diagnosis Date     GALINDO (acute kidney injury) (H)      BPH (benign prostatic hyperplasia)      Clostridium difficile colitis     h/o 10/19     Diverticulitis      GERD (gastroesophageal reflux disease)      H/O Bell's palsy      Hematuria      Hyperbilirubinemia      Hyperlipidemia      Hypertension      Insomnia      Lower back pain      Right inguinal hernia      Sleep apnea     Minor Sleep Apnea not CPAP       Social History     Tobacco Use     Smoking status: Former     Types: Cigarettes     Quit date: 1990     Years since quittin.1     Smokeless tobacco: Former     Types: Chew     Tobacco comments:     chew daily   Substance Use Topics     Alcohol use: Yes     Comment: Alcoholic Drinks/day: occasional       Review of Systems    Vitals:    23 1323   BP: (!) 166/93   Pulse: 86   Resp: 16   SpO2: 95%   Weight: 117.5 kg (259 lb)       Physical Exam  Vitals and nursing note reviewed.   Constitutional:       General: He is not in acute distress.     Appearance: He is not toxic-appearing or diaphoretic.   HENT:      Head: Normocephalic and atraumatic.      Right Ear: External ear normal.      Left Ear: External ear normal.   Eyes:      Conjunctiva/sclera: Conjunctivae normal.   Cardiovascular:      Rate and Rhythm: Normal rate and regular rhythm.      Heart sounds: No murmur heard.  Pulmonary:      Effort: Pulmonary effort is normal. No respiratory distress.      Breath sounds: No stridor. No wheezing, rhonchi or rales.   Abdominal:      General: Abdomen is flat. There is no distension.      Palpations: Abdomen is soft.      Tenderness: There is no abdominal tenderness. There is no right CVA tenderness, left CVA tenderness or guarding.   Musculoskeletal:        Arms:       Comments: She has pain relief with massage and deep palpation.   Neurological:      Mental Status: He is alert.   Psychiatric:          Mood and Affect: Mood normal.         Behavior: Behavior normal.         Thought Content: Thought content normal.         Judgment: Judgment normal.         Results:  Results for orders placed or performed in visit on 02/20/23   UA macro with reflex to Microscopic and Culture - Clinc Collect     Status: Abnormal    Specimen: Urine, Midstream   Result Value Ref Range    Color Urine Yellow Colorless, Straw, Light Yellow, Yellow    Appearance Urine Clear Clear    Glucose Urine Negative Negative mg/dL    Bilirubin Urine Negative Negative    Ketones Urine Negative Negative mg/dL    Specific Gravity Urine 1.025 1.005 - 1.030    Blood Urine Small (A) Negative    pH Urine 7.0 5.0 - 8.0    Protein Albumin Urine >=300 (A) Negative mg/dL    Urobilinogen Urine 0.2 0.2, 1.0 E.U./dL    Nitrite Urine Negative Negative    Leukocyte Esterase Urine Negative Negative   Urine Microscopic     Status: Abnormal   Result Value Ref Range    Bacteria Urine None Seen None Seen /HPF    RBC Urine 5-10 (A) 0-2 /HPF /HPF    WBC Urine 0-5 0-5 /HPF /HPF    Squamous Epithelials Urine None Seen None Seen /LPF    Narrative    Urine Culture not indicated   CBC with platelets     Status: Abnormal   Result Value Ref Range    WBC Count 5.8 4.0 - 11.0 10e3/uL    RBC Count 6.25 (H) 4.40 - 5.90 10e6/uL    Hemoglobin 18.0 (H) 13.3 - 17.7 g/dL    Hematocrit 52.8 40.0 - 53.0 %    MCV 85 78 - 100 fL    MCH 28.8 26.5 - 33.0 pg    MCHC 34.1 31.5 - 36.5 g/dL    RDW 12.9 10.0 - 15.0 %    Platelet Count 193 150 - 450 10e3/uL   Troponin I     Status: Normal   Result Value Ref Range    Troponin I 0.01 0.00 - 0.29 ng/mL   Comprehensive metabolic panel (BMP + Alb, Alk Phos, ALT, AST, Total. Bili, TP)     Status: Abnormal   Result Value Ref Range    Sodium 135 (L) 136 - 145 mmol/L    Potassium 4.5 3.5 - 5.0 mmol/L    Chloride 101 98 - 107 mmol/L    Carbon Dioxide (CO2) 23 22 - 31 mmol/L    Anion Gap 11 5 - 18 mmol/L    Urea Nitrogen 17 8 - 22 mg/dL    Creatinine 1.05 0.70  - 1.30 mg/dL    Calcium 9.8 8.5 - 10.5 mg/dL    Glucose 115 70 - 125 mg/dL    Alkaline Phosphatase 59 45 - 120 U/L    AST 20 0 - 40 U/L    ALT 25 0 - 45 U/L    Protein Total 7.9 6.0 - 8.0 g/dL    Albumin 4.2 3.5 - 5.0 g/dL    Bilirubin Total 0.8 0.0 - 1.0 mg/dL    GFR Estimate 79 >60 mL/min/1.73m2         At the end of the encounter, I discussed results, diagnosis, medications. Discussed red flags for immediate return to clinic/ER, as well as indications for follow up if no improvement. Patient understood and agreed to plan. Patient was stable for discharge.    40 minutes spent on the date of the encounter doing chart review, history and examination, documentation, and further activities as noted.

## 2023-02-20 NOTE — PATIENT INSTRUCTIONS
You will be notified of your labs via [facility].   Apply heating pad for 20 min at a time.   Apply topical pain relievers such as Aspercreme, Voltaren gel, or IcyHot.    Continue with prednisone.  You can also continue with Tylenol

## 2023-04-13 ENCOUNTER — OFFICE VISIT (OUTPATIENT)
Dept: FAMILY MEDICINE | Facility: CLINIC | Age: 68
End: 2023-04-13
Payer: COMMERCIAL

## 2023-04-13 VITALS
HEART RATE: 85 BPM | RESPIRATION RATE: 21 BRPM | HEIGHT: 69 IN | OXYGEN SATURATION: 95 % | BODY MASS INDEX: 38.36 KG/M2 | WEIGHT: 259 LBS | SYSTOLIC BLOOD PRESSURE: 136 MMHG | TEMPERATURE: 98.7 F | DIASTOLIC BLOOD PRESSURE: 80 MMHG

## 2023-04-13 DIAGNOSIS — E66.01 CLASS 2 SEVERE OBESITY DUE TO EXCESS CALORIES WITH SERIOUS COMORBIDITY AND BODY MASS INDEX (BMI) OF 37.0 TO 37.9 IN ADULT (H): ICD-10-CM

## 2023-04-13 DIAGNOSIS — N13.8 BPH WITH URINARY OBSTRUCTION: ICD-10-CM

## 2023-04-13 DIAGNOSIS — N40.1 BPH WITH URINARY OBSTRUCTION: ICD-10-CM

## 2023-04-13 DIAGNOSIS — E66.812 CLASS 2 SEVERE OBESITY DUE TO EXCESS CALORIES WITH SERIOUS COMORBIDITY AND BODY MASS INDEX (BMI) OF 37.0 TO 37.9 IN ADULT (H): ICD-10-CM

## 2023-04-13 DIAGNOSIS — G47.00 INSOMNIA, UNSPECIFIED TYPE: ICD-10-CM

## 2023-04-13 DIAGNOSIS — I10 ESSENTIAL HYPERTENSION, BENIGN: Primary | ICD-10-CM

## 2023-04-13 PROCEDURE — 99214 OFFICE O/P EST MOD 30 MIN: CPT | Performed by: FAMILY MEDICINE

## 2023-04-13 ASSESSMENT — PATIENT HEALTH QUESTIONNAIRE - PHQ9
SUM OF ALL RESPONSES TO PHQ QUESTIONS 1-9: 2
SUM OF ALL RESPONSES TO PHQ QUESTIONS 1-9: 2
10. IF YOU CHECKED OFF ANY PROBLEMS, HOW DIFFICULT HAVE THESE PROBLEMS MADE IT FOR YOU TO DO YOUR WORK, TAKE CARE OF THINGS AT HOME, OR GET ALONG WITH OTHER PEOPLE: NOT DIFFICULT AT ALL

## 2023-04-13 NOTE — PROGRESS NOTES
Assessment/Plan:    Essential hypertension, benign  Hypertension fair control currently.  Blood pressure on recheck with myself 152/80 however 136/80 with CMA recheck.  Will monitor outside of office over next 6 months until annual wellness visit after October 13, 2023.  Continuing amlodipine 5 mg daily with consideration for further dose increase to 10 mg which patient had taken in the past without significant peripheral edema or side effects described.    BPH with urinary obstruction  Described improvement BPH with urinary obstruction with use of tamsulosin 0.4 mg using 2 tablets once daily with nocturia decreasing from 3-4 times per night down to 1 time per night.    Insomnia, unspecified type  Did not have significant benefits beyond 3 hours with Lunesta 2 mg at bedtime.  Uses medicinal marijuana with benefits in place.  Medicinal marijuana prescribed for chronic.  Syndrome due to low back pain and is followed with Dr. Kelley at [facility].    Class 2 severe obesity due to excess calories with serious comorbidity and body mass index (BMI) of 37.0 to 37.9 in adult (H)  Dietary and exercise modifications reviewed for weight goal less than 250 pounds initially, less than 240 pounds ideally.          Subjective:    Jay Roth is seen today for follow-up assessment.  Hypertension reviewed.  Amlodipine 5 mg daily.  Had been on dose as high as 10 mg daily in the past however had cut back previously.  Tolerating well.  Not checking blood pressures on a regular basis.  Did have left total knee arthroplasty completed November 7, 2022 with Dr. Jarquin with [facility].  Healing well.  Mild trace edema left ankle compared to right relative to postsurgical..  Had increase tamsulosin previously from 0.4 mg up to 0.8 mg daily October 13, 2022 and does describe improvement in nocturia from 3-4 times per night down to 1 time per night.  Medicinal marijuana utilized for chronic lower back pain.  This has helped insomnia  "management more than Lunesta therefore not utilizing Lunesta any longer.  Hopes to resume more regular activity this spring with noted dietary discretions over the holidays.  Comprehensive review of systems as above otherwise all negative.     - Lovely   3 children - 2 sons, 1 daughter   5 grandchildren   Quit smoking ~ 20 years   Quit 19 - prior chew 1 tin per 3 days   EtOH: occ   Mom -  84 kidney failure   Dad -  86 \"wore out\"   2 bros -   1 sis -   Surgeries: knee arthroscopy bilateral; left inguinal hernia; laparoscopic sigmoid colectomy 19; left TKA (Dr. Ramos with [facility]) on 22   Radiofrequency ablation procedure x 3 for lower back pain   Hospitalizations: 19 through 2020 (3 total admissions required) due to GI concerns   Work: retired as beer distributor (L1-L4 degenerative back issues) - oxycodone if walk, etc.   Hobbies: swim 5x/week, fishing;    Past Surgical History:   Procedure Laterality Date     HC KNEE SCOPE,MED/LAT MENISCUS REPAIR      Description: Knee Arthroscopy With Medial Meniscus Repair;  Recorded: 2009;     HERNIA REPAIR       WI ESOPHAGOGASTRODUODENOSCOPY TRANSORAL DIAGNOSTIC N/A 2019    Procedure: ESOPHAGOGASTRODUODENOSCOPY (EGD) with biopsies;  Surgeon: Tamir Serna MD;  Location: [facility];  Service: Gastroenterology     WI LAP,SURG,COLECTOMY, PARTIAL, W/ANAST N/A 2019    Procedure: LAPAROSCOPIC sIGMOIDECTOMY;  Surgeon: Isi Lyles MD;  Location: [facility] Main OR;  Service: General     SIGMOIDOSCOPY FLEXIBLE N/A 2019    Procedure: Flexible SIGMOIDOSCOPY;  Surgeon: Isi Lyles MD;  Location: [facility] GI;  Service: Gastroenterology     SIGMOIDOSCOPY FLEXIBLE N/A 2019    Procedure: Flexible SIGMOIDOSCOPY with biopsy;  Surgeon: Isi Lyles MD;  Location: [facility] GI;  Service: Gastroenterology        No family history on file.     Past Medical History:   Diagnosis Date     " GALINDO (acute kidney injury) (H)      BPH (benign prostatic hyperplasia)      Clostridium difficile colitis     h/o 10/19     Diverticulitis      GERD (gastroesophageal reflux disease)      H/O Bell's palsy      Hematuria      Hyperbilirubinemia      Hyperlipidemia      Hypertension      Insomnia      Lower back pain      Right inguinal hernia      Sleep apnea     Minor Sleep Apnea not CPAP        Social History     Tobacco Use     Smoking status: Former     Types: Cigarettes     Quit date: 1990     Years since quittin.3     Smokeless tobacco: Former     Types: Chew     Tobacco comments:     chew daily   Substance Use Topics     Alcohol use: Yes     Comment: Alcoholic Drinks/day: occasional     Drug use: Yes     Comment: Drug use: medical cannabis        Current Outpatient Medications   Medication Sig Dispense Refill     amLODIPine (NORVASC) 5 MG tablet Take 1 tablet (5 mg) by mouth daily 90 tablet 3     augmented betamethasone dipropionate (DIPROLENE-AF) 0.05 % external ointment Apply topically sparingly to affected areas twice daily as needed 45 g 1     cyclobenzaprine (FLEXERIL) 5 MG tablet Take 1 tablet (5 mg) by mouth at bedtime as needed, may repeat once for muscle spasms 30 tablet 0     fish oil-omega-3 fatty acids 1000 MG capsule Take 2 g by mouth daily       medical cannabis (Patient's own supply) by Other route See Admin Instructions       multivitamin (ONE A DAY) per tablet Take 1 tablet by mouth daily       pravastatin (PRAVACHOL) 20 MG tablet Take 1 tablet (20 mg) by mouth every evening 90 tablet 3     tamsulosin (FLOMAX) 0.4 MG capsule Take 2 capsules (0.8 mg) by mouth daily 180 capsule 3     eszopiclone (LUNESTA) 2 MG tablet Take 1 tablet (2 mg) by mouth At Bedtime (Patient not taking: Reported on 2023) 90 tablet 1          Objective:    Vitals:    23 0933 23 0936   BP: (!) 144/80 136/80   Pulse: 85    Resp: 21    Temp: 98.7  F (37.1  C)    SpO2: 95%    Weight: 117.5 kg (259 lb)  "   Height: 1.759 m (5' 9.25\")       Body mass index is 37.97 kg/m .    Alert.  No apparent distress.  Chest clear.  Cardiac exam regular rate and rhythm.  Extremities warm and dry.      This note has been dictated using voice recognition software and as a result may contain minor grammatical errors and unintended word substitutions.       Answers for HPI/ROS submitted by the patient on 4/13/2023  If you checked off any problems, how difficult have these problems made it for you to do your work, take care of things at home, or get along with other people?: Not difficult at all  PHQ9 TOTAL SCORE: 2  Do you check your blood pressure regularly outside of the clinic?: No  Are your blood pressures ever more than 140 on the top number (systolic) OR more than 90 on the bottom number (diastolic)? (For example, greater than 140/90): Yes  Are you following a low salt diet?: No  How many servings of fruits and vegetables do you eat daily?: 2-3  On average, how many sweetened beverages do you drink each day (Examples: soda, juice, sweet tea, etc.  Do NOT count diet or artificially sweetened beverages)?: 0  How many minutes a day do you exercise enough to make your heart beat faster?: 30 to 60  How many days a week do you exercise enough to make your heart beat faster?: 5  How many days per week do you miss taking your medication?: 0      "

## 2023-06-08 ENCOUNTER — TRANSFERRED RECORDS (OUTPATIENT)
Dept: HEALTH INFORMATION MANAGEMENT | Facility: CLINIC | Age: 68
End: 2023-06-08
Payer: COMMERCIAL

## 2023-09-04 DIAGNOSIS — N13.8 BPH WITH URINARY OBSTRUCTION: ICD-10-CM

## 2023-09-04 DIAGNOSIS — N40.1 BPH WITH URINARY OBSTRUCTION: ICD-10-CM

## 2023-09-04 DIAGNOSIS — R32 URINARY INCONTINENCE, UNSPECIFIED TYPE: ICD-10-CM

## 2023-09-04 NOTE — TELEPHONE ENCOUNTER
"Routing refill request to provider for review/approval because:  Blood pressure failed  Early refill request    Last Written Prescription Date:  10/13/2022  Last Fill Quantity: 180,  # refills: 3   Last office visit provider:  4/13/2023     Requested Prescriptions   Pending Prescriptions Disp Refills    tamsulosin (FLOMAX) 0.4 MG capsule [Pharmacy Med Name: TAMSULOSIN 0.4MG CAPSULES] 180 capsule 3     Sig: TAKE 2 CAPSULES(0.8 MG) BY MOUTH DAILY       Alpha Blockers Passed - 9/4/2023  5:53 AM        Passed - Blood pressure under 140/90 in past 12 months     BP Readings from Last 3 Encounters:   04/13/23 136/80   02/20/23 (!) 166/93   02/16/23 (!) 147/90                 Passed - Recent (12 mo) or future (30 days) visit within the authorizing provider's specialty     Patient has had an office visit with the authorizing provider or a provider within the authorizing providers department within the previous 12 mos or has a future within next 30 days. See \"Patient Info\" tab in inbasket, or \"Choose Columns\" in Meds & Orders section of the refill encounter.              Passed - Patient does not have Tadalafil, Vardenafil, or Sildenafil on their medication list        Passed - Medication is active on med list        Passed - Patient is 18 years of age or older             Niki Jamison RN 09/04/23 4:27 PM  "

## 2023-09-05 RX ORDER — TAMSULOSIN HYDROCHLORIDE 0.4 MG/1
0.8 CAPSULE ORAL DAILY
Qty: 180 CAPSULE | Refills: 3 | Status: SHIPPED | OUTPATIENT
Start: 2023-09-05 | End: 2024-08-27

## 2023-10-14 ASSESSMENT — ENCOUNTER SYMPTOMS
EYE PAIN: 0
NERVOUS/ANXIOUS: 0
MYALGIAS: 1
JOINT SWELLING: 1
PARESTHESIAS: 0
WEAKNESS: 0
HEARTBURN: 0
ABDOMINAL PAIN: 0
NAUSEA: 0
HEMATURIA: 0
CHILLS: 0
DIZZINESS: 0
COUGH: 1
FEVER: 0
SORE THROAT: 0
SHORTNESS OF BREATH: 0
HEADACHES: 0
DIARRHEA: 0
ARTHRALGIAS: 1
DYSURIA: 0
CONSTIPATION: 0
PALPITATIONS: 0
FREQUENCY: 1
HEMATOCHEZIA: 0

## 2023-10-14 ASSESSMENT — ACTIVITIES OF DAILY LIVING (ADL): CURRENT_FUNCTION: NO ASSISTANCE NEEDED

## 2023-10-14 NOTE — COMMUNITY RESOURCES LIST (ENGLISH)
10/14/2023   [facility] [employer]  N/A  For questions about this resource list or additional care needs, please contact your primary care clinic or care manager.  Phone: [phone]   Email: N/A   Address: [address] [place] [address]   Hours: N/A        Financial Stability       Utility payment assistance  1  [facility]  Office [facility] Distance: 4.72 miles      In-Person, Phone/Virtual   [address] [place] [address]  Language: English  Hours: Mon - Fri 8:00 AM - 12:00 PM , Mon - Fri 1:00 PM - 4:00 PM  Fees: Free   Phone: (667) 898-8683 Email: [email or web address] Website: [email or web address].[employer]/[facility]/social-services-office-[place]/     2  [employer] - Emergency Assistance Ward - Utility payment assistance Distance: 5.32 miles      In-Person, Phone/Virtual   [address] [place] [address]  Language: English, Barbadian  Hours: Mon - Fri 9:00 AM - 4:00 PM  Fees: Free   Phone: (796) 260-4217 Email: info@[employer].org Website: http://www.[employer].org          Important Numbers & Websites       Emergency Services   911  City Services   311  Poison Control   (917) 754-3827  Suicide Prevention Lifeline   (189) 826-5366 (TALK)  Child Abuse Hotline   (153) 417-6352 (4-A-Child)  Sexual Assault Hotline   (907) 174-2610 (HOPE)  National Runaway Safeline   (345) 710-4401 (RUNAWAY)  All-Options Talkline   (882) 959-1368  Substance Abuse Referral   (115) 524-4429 (HELP)

## 2023-10-17 ENCOUNTER — OFFICE VISIT (OUTPATIENT)
Dept: FAMILY MEDICINE | Facility: CLINIC | Age: 68
End: 2023-10-17
Payer: COMMERCIAL

## 2023-10-17 VITALS
DIASTOLIC BLOOD PRESSURE: 86 MMHG | OXYGEN SATURATION: 97 % | RESPIRATION RATE: 20 BRPM | SYSTOLIC BLOOD PRESSURE: 134 MMHG | HEART RATE: 62 BPM | BODY MASS INDEX: 36.36 KG/M2 | WEIGHT: 254 LBS | HEIGHT: 70 IN | TEMPERATURE: 98.2 F

## 2023-10-17 DIAGNOSIS — N40.1 BPH WITH URINARY OBSTRUCTION: ICD-10-CM

## 2023-10-17 DIAGNOSIS — E66.01 MORBID OBESITY (H): ICD-10-CM

## 2023-10-17 DIAGNOSIS — Z12.5 SCREENING FOR PROSTATE CANCER: ICD-10-CM

## 2023-10-17 DIAGNOSIS — E78.5 HYPERLIPIDEMIA LDL GOAL <100: ICD-10-CM

## 2023-10-17 DIAGNOSIS — N13.8 BPH WITH URINARY OBSTRUCTION: ICD-10-CM

## 2023-10-17 DIAGNOSIS — Z00.00 ENCOUNTER FOR MEDICARE ANNUAL WELLNESS EXAM: Primary | ICD-10-CM

## 2023-10-17 DIAGNOSIS — I10 ESSENTIAL HYPERTENSION, BENIGN: ICD-10-CM

## 2023-10-17 DIAGNOSIS — K21.00 GASTROESOPHAGEAL REFLUX DISEASE WITH ESOPHAGITIS WITHOUT HEMORRHAGE: ICD-10-CM

## 2023-10-17 LAB
ANION GAP SERPL CALCULATED.3IONS-SCNC: 11 MMOL/L (ref 7–15)
BUN SERPL-MCNC: 18.3 MG/DL (ref 8–23)
CALCIUM SERPL-MCNC: 9.5 MG/DL (ref 8.8–10.2)
CHLORIDE SERPL-SCNC: 101 MMOL/L (ref 98–107)
CHOLEST SERPL-MCNC: 206 MG/DL
CREAT SERPL-MCNC: 1.11 MG/DL (ref 0.67–1.17)
DEPRECATED HCO3 PLAS-SCNC: 25 MMOL/L (ref 22–29)
EGFRCR SERPLBLD CKD-EPI 2021: 73 ML/MIN/1.73M2
GLUCOSE SERPL-MCNC: 92 MG/DL (ref 70–99)
HDLC SERPL-MCNC: 54 MG/DL
LDLC SERPL CALC-MCNC: 113 MG/DL
NONHDLC SERPL-MCNC: 152 MG/DL
POTASSIUM SERPL-SCNC: 4.7 MMOL/L (ref 3.4–5.3)
PSA SERPL DL<=0.01 NG/ML-MCNC: 0.64 NG/ML (ref 0–4.5)
SODIUM SERPL-SCNC: 137 MMOL/L (ref 135–145)
TRIGL SERPL-MCNC: 195 MG/DL

## 2023-10-17 PROCEDURE — G0103 PSA SCREENING: HCPCS | Performed by: FAMILY MEDICINE

## 2023-10-17 PROCEDURE — 80048 BASIC METABOLIC PNL TOTAL CA: CPT | Performed by: FAMILY MEDICINE

## 2023-10-17 PROCEDURE — G0439 PPPS, SUBSEQ VISIT: HCPCS | Performed by: FAMILY MEDICINE

## 2023-10-17 PROCEDURE — 80061 LIPID PANEL: CPT | Performed by: FAMILY MEDICINE

## 2023-10-17 PROCEDURE — 99213 OFFICE O/P EST LOW 20 MIN: CPT | Mod: 25 | Performed by: FAMILY MEDICINE

## 2023-10-17 PROCEDURE — 36415 COLL VENOUS BLD VENIPUNCTURE: CPT | Performed by: FAMILY MEDICINE

## 2023-10-17 RX ORDER — RESPIRATORY SYNCYTIAL VIRUS VACCINE 120MCG/0.5
0.5 KIT INTRAMUSCULAR ONCE
Qty: 1 EACH | Refills: 0 | Status: CANCELLED | OUTPATIENT
Start: 2023-10-17 | End: 2023-10-17

## 2023-10-17 ASSESSMENT — ENCOUNTER SYMPTOMS
FREQUENCY: 1
HEARTBURN: 0
JOINT SWELLING: 1
SORE THROAT: 0
DIARRHEA: 0
ARTHRALGIAS: 1
NAUSEA: 0
PARESTHESIAS: 0
NERVOUS/ANXIOUS: 0
SHORTNESS OF BREATH: 0
COUGH: 1
HEMATURIA: 0
DIZZINESS: 0
ABDOMINAL PAIN: 0
PALPITATIONS: 0
FEVER: 0
DYSURIA: 0
EYE PAIN: 0
HEADACHES: 0
HEMATOCHEZIA: 0
MYALGIAS: 1
CHILLS: 0
CONSTIPATION: 0
WEAKNESS: 0

## 2023-10-17 ASSESSMENT — ACTIVITIES OF DAILY LIVING (ADL): CURRENT_FUNCTION: NO ASSISTANCE NEEDED

## 2023-10-17 ASSESSMENT — PATIENT HEALTH QUESTIONNAIRE - PHQ9
SUM OF ALL RESPONSES TO PHQ QUESTIONS 1-9: 2
10. IF YOU CHECKED OFF ANY PROBLEMS, HOW DIFFICULT HAVE THESE PROBLEMS MADE IT FOR YOU TO DO YOUR WORK, TAKE CARE OF THINGS AT HOME, OR GET ALONG WITH OTHER PEOPLE: NOT DIFFICULT AT ALL
SUM OF ALL RESPONSES TO PHQ QUESTIONS 1-9: 2

## 2023-10-17 ASSESSMENT — PAIN SCALES - GENERAL: PAINLEVEL: NO PAIN (0)

## 2023-10-17 NOTE — COMMUNITY RESOURCES LIST (ENGLISH)
10/17/2023   [facility] [employer]  N/A  For questions about this resource list or additional care needs, please contact your primary care clinic or care manager.  Phone: [phone]   Email: N/A   Address: [address] [place] [address]   Hours: N/A        Financial Stability       Utility payment assistance  1  [facility]  Office [facility] Distance: 4.72 miles      In-Person, Phone/Virtual   [address] [place] [address]  Language: English  Hours: Mon - Fri 8:00 AM - 12:00 PM , Mon - Fri 1:00 PM - 4:00 PM  Fees: Free   Phone: (260) 665-2855 Email: [email or web address] Website: https://central[facility].[employer]/[facility]/social-services-office-[place]/     2  [employer] - Emergency Assistance [place] - Utility payment assistance Distance: 5.32 miles      In-Person, Phone/Virtual   [address] [place] [address]  Language: English, Nigerien  Hours: Mon - Fri 9:00 AM - 4:00 PM  Fees: Free   Phone: (998) 370-9567 Email: info@[employer].org Website: http://www.[employer]ighb[employer]mn.org          Important Numbers & Websites       Emergency Services   911  City Services   311  Poison Control   (223) 737-7316  Suicide Prevention Lifeline   (606) 317-6653 (TALK)  Child Abuse Hotline   (963) 175-4372 (4-A-Child)  Sexual Assault Hotline   (142) 226-4591 (HOPE)  National Runaway Safeline   (224) 817-4624 (RUNAWAY)  All-Options Talkline   (481) 801-3575  Substance Abuse Referral   (334) 109-3785 (HELP)

## 2023-10-17 NOTE — PATIENT INSTRUCTIONS
Patient Education   Personalized Prevention Plan  You are due for the preventive services outlined below.  Your care team is available to assist you in scheduling these services.  If you have already completed any of these items, please share that information with your care team to update in your medical record.  Health Maintenance Due   Topic Date Due     RSV VACCINE 60+ (1 - 1-dose 60+ series) Never done     Flu Vaccine (1) 09/01/2023     COVID-19 Vaccine (6 - 2023-24 season) 09/01/2023     ANNUAL REVIEW OF HM ORDERS  10/13/2023     Your Health Risk Assessment indicates you feel you are not in good health    A healthy lifestyle helps keep the body fit and the mind alert. It helps protect you from disease, helps you fight disease, and helps prevent chronic disease (disease that doesn't go away) from getting worse. This is important as you get older and begin to notice twinges in muscles and joints and a decline in the strength and stamina you once took for granted. A healthy lifestyle includes good healthcare, good nutrition, weight control, recreation, and regular exercise. Avoid harmful substances and do what you can to keep safe. Another part of a healthy lifestyle is stay mentally active and socially involved.    Good healthcare   Have a wellness visit every year.   If you have new symptoms, let us know right away. Don't wait until the next checkup.   Take medicines exactly as prescribed and keep your medicines in a safe place. Tell us if your medicine causes problems.   Healthy diet and weight control   Eat 3 or 4 small, nutritious, low-fat, high-fiber meals a day. Include a variety of fruits, vegetables, and whole-grain foods.   Make sure you get enough calcium in your diet. Calcium, vitamin D, and exercise help prevent osteoporosis (bone thinning).   If you live alone, try eating with others when you can. That way you get a good meal and have company while you eat it.   Try to keep a healthy weight. If you  eat more calories than your body uses for energy, it will be stored as fat and you will gain weight.     Recreation   Recreation is not limited to sports and team events. It includes any activity that provides relaxation, interest, enjoyment, and exercise. Recreation provides an outlet for physical, mental, and social energy. It can give a sense of worth and achievement. It can help you stay healthy.    Mental Exercise and Social Involvement  Mental and emotional health is as important as physical health. Keep in touch with friends and family. Stay as active as possible. Continue to learn and challenge yourself.   Things you can do to stay mentally active are:  Learn something new, like a foreign language or musical instrument.   Play SCRABBLE or do crossword puzzles. If you cannot find people to play these games with you at home, you can play them with others on your computer through the Internet.   Join a games club--anything from card games to chess or checkers or lawn bowling.   Start a new hobby.   Go back to school.   Volunteer.   Read.   Keep up with world events.  Hearing Loss: Care Instructions  Overview     Hearing loss is a sudden or slow decrease in how well you hear. It can range from slight to profound. Permanent hearing loss can occur with aging. It also can happen when you are exposed long-term to loud noise. Examples include listening to loud music, riding motorcycles, or being around other loud machines.  Hearing loss can affect your work and home life. It can make you feel lonely or depressed. You may feel that you have lost your independence. But hearing aids and other devices can help you hear better and feel connected to others.  Follow-up care is a key part of your treatment and safety. Be sure to make and go to all appointments, and call your doctor if you are having problems. It's also a good idea to know your test results and keep a list of the medicines you take.  How can you care for  yourself at home?  Avoid loud noises whenever possible. This helps keep your hearing from getting worse.  Always wear hearing protection around loud noises.  Wear a hearing aid as directed.  A professional can help you pick a hearing aid that will work best for you.  You can also get hearing aids over the counter for mild to moderate hearing loss.  Have hearing tests as your doctor suggests. They can show whether your hearing has changed. Your hearing aid may need to be adjusted.  Use other devices as needed. These may include:  Telephone amplifiers and hearing aids that can connect to a television, stereo, radio, or microphone.  Devices that use lights or vibrations. These alert you to the doorbell, a ringing telephone, or a baby monitor.  Television closed-captioning. This shows the words at the bottom of the screen. Most new TVs can do this.  TTY (text telephone). This lets you type messages back and forth on the telephone instead of talking or listening. These devices are also called TDD. When messages are typed on the keyboard, they are sent over the phone line to a receiving TTY. The message is shown on a monitor.  Use text messaging, social media, and email if it is hard for you to communicate by telephone.  Try to learn a listening technique called speechreading. It is not lipreading. You pay attention to people's gestures, expressions, posture, and tone of voice. These clues can help you understand what a person is saying. Face the person you are talking to, and have them face you. Make sure the lighting is good. You need to see the other person's face clearly.  Think about counseling if you need help to adjust to your hearing loss.  When should you call for help?  Watch closely for changes in your health, and be sure to contact your doctor if:    You think your hearing is getting worse.     You have new symptoms, such as dizziness or nausea.   Where can you learn more?  Go to  "[email or web address]/patiented  Enter R798 in the search box to learn more about \"Hearing Loss: Care Instructions.\"  Current as of: March 1, 2023               Content Version: 13.7 2006-2023 [employer] [facility].   Care instructions adapted under license by your healthcare professional. If you have questions about a medical condition or this instruction, always ask your healthcare professional. [facility] disclaims any warranty or liability for your use of this information.      Bladder Training: Care Instructions  Your Care Instructions     Bladder training is used to treat urge incontinence and stress incontinence. Urge incontinence means that the need to urinate comes on so fast that you can't get to a toilet in time. Stress incontinence means that you leak urine because of pressure on your bladder. For example, it may happen when you laugh, cough, or lift something heavy.  Bladder training can increase how long you can wait before you have to urinate. It can also help your bladder hold more urine. And it can give you better control over the urge to urinate.  It is important to remember that bladder training takes a few weeks to a few months to make a difference. You may not see results right away, but don't give up.  Follow-up care is a key part of your treatment and safety. Be sure to make and go to all appointments, and call your doctor if you are having problems. It's also a good idea to know your test results and keep a list of the medicines you take.  How can you care for yourself at home?  Work with your doctor to come up with a bladder training program that is right for you. You may use one or more of the following methods.  Delayed urination  In the beginning, try to keep from urinating for 5 minutes after you first feel the need to go.  While you wait, take deep, slow breaths to relax. Kegel exercises can also help you delay the need to go to the bathroom.  After some " "practice, when you can easily wait 5 minutes to urinate, try to wait 10 minutes before you urinate.  Slowly increase the waiting period until you are able to control when you have to urinate.  Scheduled urination  Empty your bladder when you first wake up in the morning.  Schedule times throughout the day when you will urinate.  Start by going to the bathroom every hour, even if you don't need to go.  Slowly increase the time between trips to the bathroom.  When you have found a schedule that works well for you, keep doing it.  If you wake up during the night and have to urinate, do it. Apply your schedule to waking hours only.  Kegel exercises  These tighten and strengthen pelvic muscles, which can help you control the flow of urine. (If doing these exercises causes pain, stop doing them and talk with your doctor.) To do Kegel exercises:  Squeeze your muscles as if you were trying not to pass gas. Or squeeze your muscles as if you were stopping the flow of urine. Your belly, legs, and buttocks shouldn't move.  Hold the squeeze for 3 seconds, then relax for 5 to 10 seconds.  Start with 3 seconds, then add 1 second each week until you are able to squeeze for 10 seconds.  Repeat the exercise 10 times a session. Do 3 to 8 sessions a day.  When should you call for help?  Watch closely for changes in your health, and be sure to contact your doctor if:    Your incontinence is getting worse.     You do not get better as expected.   Where can you learn more?  Go to https://www.[email or web address].net/patiented  Enter V684 in the search box to learn more about \"Bladder Training: Care Instructions.\"  Current as of: March 1, 2023               Content Version: 13.7    6426-1437 "[employer].   Care instructions adapted under license by your healthcare professional. If you have questions about a medical condition or this instruction, always ask your healthcare professional. "[employer] disclaims any warranty or " liability for your use of this information.

## 2023-10-17 NOTE — PROGRESS NOTES
"SUBJECTIVE:     Grzegorz is a 68 year old who presents for Preventive Visit.      10/17/2023     7:18 AM   Additional Questions   Roomed by        Are you in the first 12 months of your Medicare coverage?  No      Any wellness visit completed.  Risk questionnaire reviewed.  Suboptimal health.  Hearing loss.  History of urine incontinence associated BPH with urinary obstruction.  Known hypertension treated with amlodipine 5 mg daily.  Tamsulosin 0.4 mg using 2 capsules daily for BPH with urinary obstruction.  Pravastatin 20 mg at bedtime for lipid management.  Patient is fasting.  Last 5 pounds.  Had been down to around 245 pounds at home but then has gained some weight after recent trip to [place] to watch the [place] [employer] play [employer] football.  Medical cannabis utilized previously for chronic low back pain etc.  This has been relatively good.  Has cyclobenzaprine available on as-needed basis.  Medical cannabis does help with his insomnia concerns at times to.  Describes likely reflux symptoms.  Worse when he is laying down at night.  In his throat.  Can be after eating.  Has not tried OTC medication for this.  Comprehensive review of systems as above otherwise all negative.       - Lovely   3 children - 2 sons, 1 daughter   5 grandchildren   Quit smoking ~ 20 years   Quit 19 - prior chew 1 tin per 3 days   EtOH: occ   Mom -  84 kidney failure   Dad -  86 \"wore out\"   2 bros -   1 sis -   Surgeries: knee arthroscopy bilateral; left inguinal hernia; laparoscopic sigmoid colectomy 19; left TKA (Dr. Ramos with [facility]) on 22   Radiofrequency ablation procedure x 3 for lower back pain   Hospitalizations: 19 through 2020 (3 total admissions required) due to GI concerns   Work: retired as beer distributor (L1-L4 degenerative back issues) - oxycodone if walk, etc.   Hobbies: swim 5x/week, fishing;         Healthy Habits:     In general, how would you rate your overall health?  " "Fair    Frequency of exercise:  4-5 days/week    Duration of exercise:  45-60 minutes    Do you usually eat at least 4 servings of fruit and vegetables a day, include whole grains    & fiber and avoid regularly eating high fat or \"junk\" foods?  Yes    Taking medications regularly:  Yes    Medication side effects:  None    Ability to successfully perform activities of daily living:  No assistance needed    Home Safety:  No safety concerns identified    Hearing Impairment:  Difficulty following a conversation in a noisy restaurant or crowded room    In the past 6 months, have you been bothered by leaking of urine? Yes    In general, how would you rate your overall mental or emotional health?  Good    Additional concerns today:  No      Today's PHQ-9 Score:       10/17/2023     7:18 AM   PHQ-9 SCORE   PHQ-9 Total Score MyChart 2 (Minimal depression)   PHQ-9 Total Score 2           Have you ever done Advance Care Planning? (For example, a Health Directive, POLST, or a discussion with a medical provider or your loved ones about your wishes): Yes, advance care planning is on file.       Fall risk  Fallen 2 or more times in the past year?: No  Any fall with injury in the past year?: No    Cognitive Screening   1) Repeat 3 items (Leader, Season, Table)    2) Clock draw: NORMAL  3) 3 item recall: Recalls 3 objects  Results: 3 items recalled: COGNITIVE IMPAIRMENT LESS LIKELY    Mini-CogTM Copyright KUSHAL Root. Licensed by the author for use in [facility]; reprinted with permission ([email or web address]). All rights reserved.      Do you have sleep apnea, excessive snoring or daytime drowsiness? : yes    Reviewed and updated as needed this visit by clinical staff   Tobacco  Allergies  Meds  Problems             Reviewed and updated as needed this visit by Provider    Allergies  Meds  Problems            Social History     Tobacco Use    Smoking status: Former     Types: Cigarettes     Quit date: 1/1/1990     Years " since quittin.8    Smokeless tobacco: Former     Types: Chew    Tobacco comments:     chew daily   Substance Use Topics    Alcohol use: Yes     Comment: Alcoholic Drinks/day: occasional             10/14/2023    12:36 PM   Alcohol Use   Prescreen: >3 drinks/day or >7 drinks/week? No     Do you have a current opioid prescription? No  Do you use any other controlled substances or medications that are not prescribed by a provider? Cannabis              Current providers sharing in care for this patient include:   Patient Care Team:  Aravind Villa MD as PCP - General (Family Practice)  Aravind Villa MD as Assigned PCP    The following health maintenance items are reviewed in Epic and correct as of today:  Health Maintenance   Topic Date Due    RSV VACCINE 60+ (1 - 1-dose 60+ series) Never done    INFLUENZA VACCINE (1) 2023    COVID-19 Vaccine ( - -24 season) 2023    PHQ-9  2024    MEDICARE ANNUAL WELLNESS VISIT  10/17/2024    ANNUAL REVIEW OF HM ORDERS  10/17/2024    FALL RISK ASSESSMENT  10/17/2024    LIPID  10/14/2027    COLORECTAL CANCER SCREENING  2028    ADVANCE CARE PLANNING  10/17/2028    DTAP/TDAP/TD IMMUNIZATION (3 - Td or Tdap) 10/13/2032    HEPATITIS C SCREENING  Completed    Pneumococcal Vaccine: 65+ Years  Completed    ZOSTER IMMUNIZATION  Completed    AORTIC ANEURYSM SCREENING (SYSTEM ASSIGNED)  Completed    IPV IMMUNIZATION  Aged Out    HPV IMMUNIZATION  Aged Out    MENINGITIS IMMUNIZATION  Aged Out    LUNG CANCER SCREENING  Discontinued     Lab work is in process  Labs reviewed in EPIC  BP Readings from Last 3 Encounters:   10/17/23 134/86   23 136/80   23 (!) 166/93    Wt Readings from Last 3 Encounters:   10/17/23 115.2 kg (254 lb)   23 117.5 kg (259 lb)   23 117.5 kg (259 lb)                  Patient Active Problem List   Diagnosis    Smokeless tobacco use    Hyperlipidemia LDL goal <100    Benign Prostatic Hypertrophy    Cerumen  Impaction    Lower Back Pain    Diverticulosis    Hematuria    JIGNA (obstructive sleep apnea)    Benign Essential Hypertension    Insomnia    Colon polyp    History of Bell's palsy    Hyperbilirubinemia    Acute diverticulitis    Non-recurrent unilateral inguinal hernia without obstruction or gangrene    Diverticulitis    GALINDO (acute kidney injury) (H24)    Acute kidney injury (H24)    Nausea and vomiting, intractability of vomiting not specified, unspecified vomiting type    Recurrent Clostridioides difficile diarrhea    Abdominal cramping    Emesis    Obesity (BMI 35.0-39.9) with comorbidity (H)    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 37.0 to 37.9 in adult    Serrated adenoma of colon    BPH with urinary obstruction    Urinary incontinence, unspecified type     Past Surgical History:   Procedure Laterality Date    HC KNEE SCOPE,MED/LAT MENISCUS REPAIR      Description: Knee Arthroscopy With Medial Meniscus Repair;  Recorded: 2009;    HERNIA REPAIR      AZ ESOPHAGOGASTRODUODENOSCOPY TRANSORAL DIAGNOSTIC N/A 2019    Procedure: ESOPHAGOGASTRODUODENOSCOPY (EGD) with biopsies;  Surgeon: Tamir Serna MD;  Location: [facility];  Service: Gastroenterology    AZ LAP,SURG,COLECTOMY, PARTIAL, W/ANAST N/A 2019    Procedure: LAPAROSCOPIC sIGMOIDECTOMY;  Surgeon: Isi Lyles MD;  Location: [facility] OR;  Service: General    SIGMOIDOSCOPY FLEXIBLE N/A 2019    Procedure: Flexible SIGMOIDOSCOPY;  Surgeon: Isi Lyles MD;  Location: [facility];  Service: Gastroenterology    SIGMOIDOSCOPY FLEXIBLE N/A 2019    Procedure: Flexible SIGMOIDOSCOPY with biopsy;  Surgeon: Isi Lyles MD;  Location: [facility];  Service: Gastroenterology       Social History     Tobacco Use    Smoking status: Former     Types: Cigarettes     Quit date: 1990     Years since quittin.8    Smokeless tobacco: Former     Types:  Chew    Tobacco comments:     chew daily   Substance Use Topics    Alcohol use: Yes     Comment: Alcoholic Drinks/day: occasional     No family history on file.      Current Outpatient Medications   Medication Sig Dispense Refill    amLODIPine (NORVASC) 5 MG tablet Take 1 tablet (5 mg) by mouth daily 90 tablet 3    augmented betamethasone dipropionate (DIPROLENE-AF) 0.05 % external ointment Apply topically sparingly to affected areas twice daily as needed 45 g 1    fish oil-omega-3 fatty acids 1000 MG capsule Take 2 g by mouth daily      medical cannabis (Patient's own supply) by Other route See Admin Instructions      multivitamin (ONE A DAY) per tablet Take 1 tablet by mouth daily      omeprazole (PRILOSEC) 20 MG DR capsule Take 1 capsule (20 mg) by mouth daily 30 capsule 5    pravastatin (PRAVACHOL) 20 MG tablet Take 1 tablet (20 mg) by mouth every evening 90 tablet 3    tamsulosin (FLOMAX) 0.4 MG capsule TAKE 2 CAPSULES(0.8 MG) BY MOUTH DAILY 180 capsule 3    cyclobenzaprine (FLEXERIL) 5 MG tablet Take 1 tablet (5 mg) by mouth at bedtime as needed, may repeat once for muscle spasms (Patient not taking: Reported on 10/17/2023) 30 tablet 0     Allergies   Allergen Reactions    Atorvastatin Calcium [Atorvastatin] Dizziness     We will receive flu and COVID vaccinations through [facility]      Review of Systems   Constitutional:  Negative for chills and fever.   HENT:  Negative for congestion, ear pain, hearing loss and sore throat.    Eyes:  Negative for pain and visual disturbance.   Respiratory:  Positive for cough. Negative for shortness of breath.    Cardiovascular:  Positive for chest pain and peripheral edema. Negative for palpitations.   Gastrointestinal:  Negative for abdominal pain, constipation, diarrhea, heartburn, hematochezia and nausea.   Genitourinary:  Positive for frequency, impotence and urgency. Negative for dysuria, genital sores, hematuria and penile discharge.   Musculoskeletal:  Positive for  "arthralgias, joint swelling and myalgias.   Skin:  Negative for rash.   Neurological:  Negative for dizziness, weakness, headaches and paresthesias.   Psychiatric/Behavioral:  Negative for mood changes. The patient is not nervous/anxious.      Constitutional, HEENT, cardiovascular, pulmonary, GI, , musculoskeletal, neuro, skin, endocrine and psych systems are negative, except as otherwise noted.    OBJECTIVE:   /86   Pulse 62   Temp 98.2  F (36.8  C)   Resp 20   Ht 1.765 m (5' 9.5\")   Wt 115.2 kg (254 lb)   SpO2 97%   BMI 36.97 kg/m   Estimated body mass index is 36.97 kg/m  as calculated from the following:    Height as of this encounter: 1.765 m (5' 9.5\").    Weight as of this encounter: 115.2 kg (254 lb).    Physical Exam  GENERAL: healthy, alert and no distress.  BMI 36.97.  EYES: Eyes grossly normal to inspection, PERRL and conjunctivae and sclerae normal  HENT: ear canals and TM's normal, nose and mouth without ulcers or lesions  NECK: no adenopathy, no asymmetry, masses, or scars and thyroid normal to palpation  RESP: lungs clear to auscultation - no rales, rhonchi or wheezes  CV: regular rate and rhythm, normal S1 S2, no S3 or S4, no murmur, click or rub, no peripheral edema and peripheral pulses strong  ABDOMEN: soft, nontender, no hepatosplenomegaly, no masses and bowel sounds normal   (male): normal male genitalia without lesions or urethral discharge, no hernia  RECTAL: normal sphincter tone, no rectal masses, prostate normal size, smooth, nontender without nodules or masses  MS: no gross musculoskeletal defects noted, no edema  SKIN: no suspicious lesions or rashes  NEURO: Normal strength and tone, mentation intact and speech normal  PSYCH: mentation appears normal, affect normal/bright    Diagnostic Test Results:  Labs reviewed in Epic  No results found for this or any previous visit (from the past 24 hour(s)).    ASSESSMENT / PLAN:     Encounter for Medicare annual wellness " "exam  Annual wellness visit completed.  Risk questionnaire reviewed.  Suboptimal health, hearing as well as urine incontinence concerns.  Annual wellness visits to continue.    Obesity (BMI 35.0-39.9) with comorbidity (H)  Dietary and exercise modification for weight goal less than 240 pounds initially, less than 230 pounds ideally.    Benign Essential Hypertension  Hypertension.  Amlodipine 5 mg daily.  Check basic metabolic panel for med monitoring  - Basic metabolic panel  - Basic metabolic panel    BPH with urinary obstruction  BPH with urinary obstruction.  Tamsulosin 0.8 mg daily.  Benefits described.    Hyperlipidemia LDL goal <100  Hyperlipidemia.  Pravastatin 20 mg at bedtime.  Check lipid cascade today while fasting.  Weight goal less than 240 pounds initially, less than 230 pounds ideally.  - Lipid panel reflex to direct LDL Fasting  - Lipid panel reflex to direct LDL Fasting    Gastroesophageal reflux disease with esophagitis without hemorrhage  Trial of omeprazole 20 mg daily over next 2 weeks and as needed basis.  - omeprazole (PRILOSEC) 20 MG DR capsule  Dispense: 30 capsule; Refill: 5  - Basic metabolic panel  - Basic metabolic panel    Screening for prostate cancer  PSA for prostate cancer screening.  - Prostate Specific Antigen Screen  - Prostate Specific Antigen Screen         Patient has been advised of split billing requirements and indicates understanding: Yes      COUNSELING:  Reviewed preventive health counseling, as reflected in patient instructions       Regular exercise       Healthy diet/nutrition       Vision screening       Hearing screening       Dental care       Bladder control       Fall risk prevention       Aspirin prophylaxis        Colon cancer screening       Prostate cancer screening      BMI:   Estimated body mass index is 36.97 kg/m  as calculated from the following:    Height as of this encounter: 1.765 m (5' 9.5\").    Weight as of this encounter: 115.2 kg (254 lb). "   Weight management plan: Discussed healthy diet and exercise guidelines      He reports that he quit smoking about 33 years ago. His smoking use included cigarettes. He has quit using smokeless tobacco.  His smokeless tobacco use included chew.      Appropriate preventive services were discussed with this patient, including applicable screening as appropriate for fall prevention, nutrition, physical activity, Tobacco-use cessation, weight loss and cognition.  Checklist reviewing preventive services available has been given to the patient.    Reviewed patients plan of care and provided an AVS. The Basic Care Plan (routine screening as documented in Health Maintenance) for Jay meets the Care Plan requirement. This Care Plan has been established and reviewed with the Patient.          Aravind Villa MD  [facility]    Identified Health Risks:    Answers submitted by the patient for this visit:  Patient Health Questionnaire (Submitted on 10/17/2023)  If you checked off any problems, how difficult have these problems made it for you to do your work, take care of things at home, or get along with other people?: Not difficult at all  PHQ9 TOTAL SCORE: 2  The patient was provided with suggestions to help him develop a healthy physical lifestyle.  The patient was provided with written information regarding signs of hearing loss.  Information on urinary incontinence and treatment options given to patient.

## 2023-10-17 NOTE — COMMUNITY RESOURCES LIST (ENGLISH)
10/17/2023   [facility] - Outpatient Clinics  N/A  For additional resource needs, please contact your health insurance member services or your primary care team.  Phone: [phone]   Email: N/A   Address: [facility], [id number] [place] [address]   Hours: N/A        Financial Stability       Utility payment assistance  1  [place] [employer] - [place]'s Telephone Assistance Plan (TAP) and Federal Lifeline and Affordable Connectivity Program (ACP) Distance: 8.16 miles      Phone/Virtual   [address] [place] [address]  Language: English  Fees: Free   Phone: (539) 492-9220 Email: consumer.puc@[facility].mn. Website: https://[place].gov/puc/consumers/telephone/     2  [place] [employer] - Energy and Utilities Distance: 9.81 miles      In-Person, Phone/Virtual   [address] [place] [address]  Language: English  Hours: Mon - Fri 8:30 AM - 4:30 PM  Fees: Free   Phone: (344) 938-8676 Website: https://[place].gov/[employer]/energy/consumer-assistance/energy-assistance-program/          Important Numbers & Websites       [facility]   [address] 211itedway.org  Poison Control   (146) 964-4945 [place]poison.[place]  Suicide and Crisis Lifeline   [address] [facility].org  Childhelp [place] Child Abuse Hotline   515.389.7300 Childhelphotline.org  National Sexual Assault Hotline   (129) 313-3193 (HOPE) Rainn.org  National Runaway Safeline   (827) 514-7305 (RUNAWAY) 1800runaway.org  Pregnancy & Postpartum Support [place]   Call/text 384-048-3832 Ppsupportmn.org  Substance Abuse National Helpline ([facility]   596-439-HELP (2796) Findtreatment.gov  Emergency Services   911

## 2023-10-24 ENCOUNTER — PATIENT OUTREACH (OUTPATIENT)
Dept: GASTROENTEROLOGY | Facility: CLINIC | Age: 68
End: 2023-10-24
Payer: COMMERCIAL

## 2023-11-09 ENCOUNTER — APPOINTMENT (OUTPATIENT)
Dept: URBAN - METROPOLITAN AREA CLINIC 260 | Age: 68
Setting detail: DERMATOLOGY
End: 2023-11-10

## 2023-11-09 VITALS — WEIGHT: 250 LBS | HEIGHT: 70 IN

## 2023-11-09 DIAGNOSIS — D18.0 HEMANGIOMA: ICD-10-CM

## 2023-11-09 DIAGNOSIS — D22 MELANOCYTIC NEVI: ICD-10-CM

## 2023-11-09 DIAGNOSIS — L82.0 INFLAMED SEBORRHEIC KERATOSIS: ICD-10-CM

## 2023-11-09 DIAGNOSIS — Z71.89 OTHER SPECIFIED COUNSELING: ICD-10-CM

## 2023-11-09 DIAGNOSIS — L82.1 OTHER SEBORRHEIC KERATOSIS: ICD-10-CM

## 2023-11-09 DIAGNOSIS — L72.8 OTHER FOLLICULAR CYSTS OF THE SKIN AND SUBCUTANEOUS TISSUE: ICD-10-CM

## 2023-11-09 DIAGNOSIS — D49.2 NEOPLASM OF UNSPECIFIED BEHAVIOR OF BONE, SOFT TISSUE, AND SKIN: ICD-10-CM

## 2023-11-09 DIAGNOSIS — L57.0 ACTINIC KERATOSIS: ICD-10-CM

## 2023-11-09 DIAGNOSIS — L21.8 OTHER SEBORRHEIC DERMATITIS: ICD-10-CM

## 2023-11-09 DIAGNOSIS — L81.4 OTHER MELANIN HYPERPIGMENTATION: ICD-10-CM

## 2023-11-09 PROBLEM — D22.5 MELANOCYTIC NEVI OF TRUNK: Status: ACTIVE | Noted: 2023-11-09

## 2023-11-09 PROBLEM — D18.01 HEMANGIOMA OF SKIN AND SUBCUTANEOUS TISSUE: Status: ACTIVE | Noted: 2023-11-09

## 2023-11-09 PROCEDURE — OTHER MIPS QUALITY: OTHER

## 2023-11-09 PROCEDURE — 17000 DESTRUCT PREMALG LESION: CPT | Mod: 59

## 2023-11-09 PROCEDURE — OTHER PHOTO-DOCUMENTATION: OTHER

## 2023-11-09 PROCEDURE — OTHER ADDITIONAL NOTES: OTHER

## 2023-11-09 PROCEDURE — OTHER PRESCRIPTION MEDICATION MANAGEMENT: OTHER

## 2023-11-09 PROCEDURE — 17110 DESTRUCT B9 LESION 1-14: CPT

## 2023-11-09 PROCEDURE — OTHER COUNSELING: OTHER

## 2023-11-09 PROCEDURE — 99203 OFFICE O/P NEW LOW 30 MIN: CPT | Mod: 25

## 2023-11-09 PROCEDURE — OTHER LIQUID NITROGEN: OTHER

## 2023-11-09 PROCEDURE — OTHER PRESCRIPTION: OTHER

## 2023-11-09 RX ORDER — FLUOCINOLONE ACETONIDE 0.11 MG/ML
OIL AURICULAR (OTIC) BID
Qty: 20 | Refills: 1 | Status: ERX | COMMUNITY
Start: 2023-11-09

## 2023-11-09 RX ORDER — FLUOROURACIL 5 MG/G
CREAM TOPICAL BID
Qty: 40 | Refills: 1 | Status: ERX | COMMUNITY
Start: 2023-11-09

## 2023-11-09 ASSESSMENT — LOCATION DETAILED DESCRIPTION DERM
LOCATION DETAILED: RIGHT MEDIAL SUPERIOR CHEST
LOCATION DETAILED: SUPERIOR LUMBAR SPINE
LOCATION DETAILED: LEFT CRUS OF HELIX
LOCATION DETAILED: POSTERIOR MID-PARIETAL SCALP
LOCATION DETAILED: RIGHT MEDIAL TRAPEZIAL NECK
LOCATION DETAILED: INFERIOR THORACIC SPINE
LOCATION DETAILED: LEFT CENTRAL MALAR CHEEK
LOCATION DETAILED: RIGHT MEDIAL MALAR CHEEK
LOCATION DETAILED: NASAL DORSUM
LOCATION DETAILED: RIGHT SUPERIOR LATERAL NECK
LOCATION DETAILED: INFERIOR MID FOREHEAD

## 2023-11-09 ASSESSMENT — LOCATION ZONE DERM
LOCATION ZONE: FACE
LOCATION ZONE: EAR
LOCATION ZONE: NECK
LOCATION ZONE: TRUNK
LOCATION ZONE: NOSE
LOCATION ZONE: SCALP

## 2023-11-09 ASSESSMENT — LOCATION SIMPLE DESCRIPTION DERM
LOCATION SIMPLE: RIGHT CHEEK
LOCATION SIMPLE: NECK
LOCATION SIMPLE: LEFT EAR
LOCATION SIMPLE: LOWER BACK
LOCATION SIMPLE: POSTERIOR NECK
LOCATION SIMPLE: UPPER BACK
LOCATION SIMPLE: NOSE
LOCATION SIMPLE: POSTERIOR SCALP
LOCATION SIMPLE: INFERIOR FOREHEAD
LOCATION SIMPLE: CHEST
LOCATION SIMPLE: LEFT CHEEK

## 2023-11-09 NOTE — PROCEDURE: LIQUID NITROGEN
Render Note In Bullet Format When Appropriate: No
Show Applicator Variable?: Yes
Detail Level: Detailed
Consent: The patient's consent was obtained including but not limited to risks of crusting, scabbing, blistering, scarring, darker or lighter pigmentary change, recurrence, incomplete removal and infection.
Number Of Freeze-Thaw Cycles: 3 freeze-thaw cycles
Post-Care Instructions: I reviewed with the patient in detail post-care instructions. Patient is to wear sunprotection, and avoid picking at any of the treated lesions. Pt may apply Vaseline to crusted or scabbing areas.
Duration Of Freeze Thaw-Cycle (Seconds): 3
Medical Necessity Clause: This procedure was medically necessary because the lesions that were treated were:
Spray Paint Text: The liquid nitrogen was applied to the skin utilizing a spray paint frosting technique.
Medical Necessity Information: It is in your best interest to select a reason for this procedure from the list below. All of these items fulfill various CMS LCD requirements except the new and changing color options.

## 2023-11-09 NOTE — PROCEDURE: PRESCRIPTION MEDICATION MANAGEMENT
Plan: Follow up in 6-8 weeks to recheck
Detail Level: Zone
Render In Strict Bullet Format?: No
Initiate Treatment: Apply 5FU cream to the scalp and face BID for 10-14 days

## 2023-11-09 NOTE — PROCEDURE: ADDITIONAL NOTES
Render Risk Assessment In Note?: no
Additional Notes: Patient is unsure how long this lesion has been present and it has been traumatized and opened from scratching, so will observe at this time and recheck in about 6-8 weeks.
Detail Level: Simple

## 2023-11-09 NOTE — HPI: FULL BODY SKIN EXAMINATION
How Severe Are Your Spot(S)?: moderate
What Type Of Note Output Would You Prefer (Optional)?: Standard Output
What Is The Reason For Today's Visit?: Full Body Skin Examination
What Is The Reason For Today's Visit? (Being Monitored For X): surveillance against the recurrence of atypical nevi
Additional History: Patient was previously seen at [facility], but had difficulty getting in for an appointment there, so is looking to transfer his care. He reports a history of Mohs on his left temple, but is unsure what type of skin cancer was removed. He also has a significant history of AKs and typically does an annual PDT on the face and scalp. His most recent PDT was about 5 months ago. He has also used 5FU in his arms in the past.

## 2023-12-03 DIAGNOSIS — I10 ESSENTIAL HYPERTENSION, BENIGN: ICD-10-CM

## 2023-12-03 DIAGNOSIS — E78.5 HYPERLIPIDEMIA LDL GOAL <100: ICD-10-CM

## 2023-12-04 RX ORDER — AMLODIPINE BESYLATE 5 MG/1
5 TABLET ORAL DAILY
Qty: 90 TABLET | Refills: 2 | Status: SHIPPED | OUTPATIENT
Start: 2023-12-04 | End: 2024-02-20

## 2023-12-04 RX ORDER — PRAVASTATIN SODIUM 20 MG
20 TABLET ORAL EVERY EVENING
Qty: 90 TABLET | Refills: 2 | Status: ON HOLD | OUTPATIENT
Start: 2023-12-04 | End: 2024-02-12

## 2023-12-15 ENCOUNTER — OFFICE VISIT (OUTPATIENT)
Dept: FAMILY MEDICINE | Facility: CLINIC | Age: 68
End: 2023-12-15
Payer: COMMERCIAL

## 2023-12-15 ENCOUNTER — HOSPITAL ENCOUNTER (EMERGENCY)
Facility: CLINIC | Age: 68
Discharge: HOME | End: 2023-12-15
Attending: EMERGENCY MEDICINE | Admitting: EMERGENCY MEDICINE
Payer: COMMERCIAL

## 2023-12-15 ENCOUNTER — APPOINTMENT (OUTPATIENT)
Dept: RADIOLOGY | Facility: CLINIC | Age: 68
End: 2023-12-15
Attending: EMERGENCY MEDICINE
Payer: COMMERCIAL

## 2023-12-15 VITALS
DIASTOLIC BLOOD PRESSURE: 80 MMHG | HEIGHT: 70 IN | RESPIRATION RATE: 18 BRPM | SYSTOLIC BLOOD PRESSURE: 144 MMHG | HEART RATE: 81 BPM | BODY MASS INDEX: 35.79 KG/M2 | TEMPERATURE: 98.3 F | OXYGEN SATURATION: 97 % | WEIGHT: 250 LBS

## 2023-12-15 VITALS
BODY MASS INDEX: 36.97 KG/M2 | TEMPERATURE: 97.9 F | SYSTOLIC BLOOD PRESSURE: 156 MMHG | WEIGHT: 254 LBS | OXYGEN SATURATION: 95 % | RESPIRATION RATE: 18 BRPM | HEART RATE: 71 BPM | DIASTOLIC BLOOD PRESSURE: 99 MMHG

## 2023-12-15 DIAGNOSIS — R07.9 CHEST PAIN, UNSPECIFIED TYPE: ICD-10-CM

## 2023-12-15 DIAGNOSIS — R07.9 CHEST PAIN, UNSPECIFIED TYPE: Primary | ICD-10-CM

## 2023-12-15 LAB
ANION GAP SERPL CALCULATED.3IONS-SCNC: 10 MMOL/L (ref 7–15)
ATRIAL RATE - MUSE: 64 BPM
ATRIAL RATE - MUSE: 66 BPM
BASOPHILS # BLD AUTO: 0.1 10E3/UL (ref 0–0.2)
BASOPHILS NFR BLD AUTO: 1 %
BUN SERPL-MCNC: 20.6 MG/DL (ref 8–23)
CALCIUM SERPL-MCNC: 10.1 MG/DL (ref 8.8–10.2)
CHLORIDE SERPL-SCNC: 101 MMOL/L (ref 98–107)
CREAT SERPL-MCNC: 1.07 MG/DL (ref 0.67–1.17)
D DIMER PPP FEU-MCNC: 0.28 UG/ML FEU (ref 0–0.5)
DEPRECATED HCO3 PLAS-SCNC: 27 MMOL/L (ref 22–29)
DIASTOLIC BLOOD PRESSURE - MUSE: 69 MMHG
DIASTOLIC BLOOD PRESSURE - MUSE: NORMAL MMHG
EGFRCR SERPLBLD CKD-EPI 2021: 77 ML/MIN/1.73M2
EOSINOPHIL # BLD AUTO: 0.2 10E3/UL (ref 0–0.7)
EOSINOPHIL NFR BLD AUTO: 3 %
ERYTHROCYTE [DISTWIDTH] IN BLOOD BY AUTOMATED COUNT: 13.1 % (ref 10–15)
GLUCOSE SERPL-MCNC: 100 MG/DL (ref 70–99)
HCT VFR BLD AUTO: 49.5 % (ref 40–53)
HGB BLD-MCNC: 16.9 G/DL (ref 13.3–17.7)
IMM GRANULOCYTES # BLD: 0 10E3/UL
IMM GRANULOCYTES NFR BLD: 1 %
INTERPRETATION ECG - MUSE: NORMAL
INTERPRETATION ECG - MUSE: NORMAL
LYMPHOCYTES # BLD AUTO: 1.1 10E3/UL (ref 0.8–5.3)
LYMPHOCYTES NFR BLD AUTO: 18 %
MCH RBC QN AUTO: 29.3 PG (ref 26.5–33)
MCHC RBC AUTO-ENTMCNC: 34.1 G/DL (ref 31.5–36.5)
MCV RBC AUTO: 86 FL (ref 78–100)
MONOCYTES # BLD AUTO: 0.5 10E3/UL (ref 0–1.3)
MONOCYTES NFR BLD AUTO: 8 %
NEUTROPHILS # BLD AUTO: 4.2 10E3/UL (ref 1.6–8.3)
NEUTROPHILS NFR BLD AUTO: 69 %
NRBC # BLD AUTO: 0 10E3/UL
NRBC BLD AUTO-RTO: 0 /100
P AXIS - MUSE: 44 DEGREES
P AXIS - MUSE: 50 DEGREES
PLATELET # BLD AUTO: 188 10E3/UL (ref 150–450)
POTASSIUM SERPL-SCNC: 4.5 MMOL/L (ref 3.4–5.3)
PR INTERVAL - MUSE: 190 MS
PR INTERVAL - MUSE: 194 MS
QRS DURATION - MUSE: 106 MS
QRS DURATION - MUSE: 110 MS
QT - MUSE: 372 MS
QT - MUSE: 382 MS
QTC - MUSE: 389 MS
QTC - MUSE: 394 MS
R AXIS - MUSE: -10 DEGREES
R AXIS - MUSE: -17 DEGREES
RBC # BLD AUTO: 5.77 10E6/UL (ref 4.4–5.9)
SODIUM SERPL-SCNC: 138 MMOL/L (ref 135–145)
SYSTOLIC BLOOD PRESSURE - MUSE: 128 MMHG
SYSTOLIC BLOOD PRESSURE - MUSE: NORMAL MMHG
T AXIS - MUSE: 20 DEGREES
T AXIS - MUSE: 25 DEGREES
TROPONIN T SERPL HS-MCNC: 10 NG/L
VENTRICULAR RATE- MUSE: 64 BPM
VENTRICULAR RATE- MUSE: 66 BPM
WBC # BLD AUTO: 6.1 10E3/UL (ref 4–11)

## 2023-12-15 PROCEDURE — 85025 COMPLETE CBC W/AUTO DIFF WBC: CPT | Performed by: EMERGENCY MEDICINE

## 2023-12-15 PROCEDURE — 82310 ASSAY OF CALCIUM: CPT | Performed by: EMERGENCY MEDICINE

## 2023-12-15 PROCEDURE — 99214 OFFICE O/P EST MOD 30 MIN: CPT | Mod: 25 | Performed by: PHYSICIAN ASSISTANT

## 2023-12-15 PROCEDURE — 250N000013 HC RX MED GY IP 250 OP 250 PS 637: Performed by: EMERGENCY MEDICINE

## 2023-12-15 PROCEDURE — 36415 COLL VENOUS BLD VENIPUNCTURE: CPT | Performed by: EMERGENCY MEDICINE

## 2023-12-15 PROCEDURE — 71046 X-RAY EXAM CHEST 2 VIEWS: CPT

## 2023-12-15 PROCEDURE — 93010 ELECTROCARDIOGRAM REPORT: CPT | Performed by: INTERNAL MEDICINE

## 2023-12-15 PROCEDURE — 85379 FIBRIN DEGRADATION QUANT: CPT | Performed by: EMERGENCY MEDICINE

## 2023-12-15 PROCEDURE — 84484 ASSAY OF TROPONIN QUANT: CPT | Performed by: EMERGENCY MEDICINE

## 2023-12-15 PROCEDURE — 82374 ASSAY BLOOD CARBON DIOXIDE: CPT | Performed by: EMERGENCY MEDICINE

## 2023-12-15 PROCEDURE — 93005 ELECTROCARDIOGRAM TRACING: CPT | Performed by: PHYSICIAN ASSISTANT

## 2023-12-15 PROCEDURE — 93005 ELECTROCARDIOGRAM TRACING: CPT | Performed by: EMERGENCY MEDICINE

## 2023-12-15 PROCEDURE — 99285 EMERGENCY DEPT VISIT HI MDM: CPT | Mod: 25

## 2023-12-15 RX ORDER — ASPIRIN 81 MG/1
324 TABLET, CHEWABLE ORAL ONCE
Status: COMPLETED | OUTPATIENT
Start: 2023-12-15 | End: 2023-12-15

## 2023-12-15 RX ADMIN — ASPIRIN 81 MG CHEWABLE TABLET 324 MG: 81 TABLET CHEWABLE at 12:08

## 2023-12-15 NOTE — DISCHARGE INSTRUCTIONS
EKG, laboratory workup and chest x-ray today was unremarkable.  Given her age and risk factors would recommend getting a repeat stress test as your most recent one was back in 2020.  Call rapid access cardiology clinic to schedule follow-up appointment to discuss stress testing.

## 2023-12-15 NOTE — ED NOTES
Pt discharged from the lobby. Will follow up with rapid access clinic. No other questions at this time. See providers notes for further access.

## 2023-12-15 NOTE — ED NOTES
Expected Patient Referral to ED  11:30 AM    Referring Clinic/Provider:  [facility]    Reason for referral/Clinical facts:  L chest pain 1 week waxes and wanes not associated with activity. No SOB, N/V, URI. Hx hyperlipidemia, HTN, obesity. NSR with PACs no LLOYD on EKG.    Recommendations provided:  Send to ED for further evaluation    Caller was informed that this institution does possess the capabilities and/or resources to provide for patient and should be transferred to our facility.    Discussed that if direct admit is sought and any hurdles are encountered, this ED would be happy to see the patient and evaluate.    Informed caller that recommendations provided are recommendations based only on the facts provided and that they responsible to accept or reject the advice, or to seek a formal in person consultation as needed and that this ED will see/treat patient should they arrive.      MD NANCY BAGLEY [facility] EMERGENCY ROOM  [address] [place] [id number] [phone]       Florina Morejon MD  12/15/23 [id number]

## 2023-12-15 NOTE — PROGRESS NOTES
"Assessment & Plan     Chest pain, unspecified type  Pt is a 68 year old male with hx of htn, dyslipidemia, JIGNA, obesity, with 1 week hx of intermittent L side chest pain.  ECG done in the clinic NSR rate of 64 without st elevation or depression.  DDX is wide and includes but not limited to ACS, unstable angina, musculoskeletal pain, PNA, PTX, PE.    Recommend ED level care to evaluate for life threatening causes.    Pt agreeable with plan.  Discussed with EDMD at [facility].    To ED via private vehicle at [facility] via private vehicle.    - EKG 12-lead, tracing only           Adamaris Hall PA-C  [facility]    Subjective     Grzegorz is a 68 year old male who presents to clinic today for the following health issues:  Chief Complaint   Patient presents with    Chest Pain     Has chest pain on left side  for 1 week  pain is pretty constant has had pleurisy before      HPI  PT is a 68 year old with history of htn, dyslipidemia, BPH, JIGNA, obesity who presents with concerns re: L chest discomfort.    L lateral chest into the L arm.    Intermittant through the day..  \"tingling\" in the face.    No sob.    No nausea, vomiting.    No recent trauma or injury.    Not exertional related.    Can not identify provocative factors.   No edema.    Occasional dry cough.   No covid 19 recently.    Occasional heart racing sensation at night time.      Patient Active Problem List   Diagnosis    Smokeless tobacco use    Hyperlipidemia LDL goal <100    Benign Prostatic Hypertrophy    Cerumen Impaction    Lower Back Pain    Diverticulosis    Hematuria    JIGNA (obstructive sleep apnea)    Benign Essential Hypertension    Insomnia    Colon polyp    History of Bell's palsy    Hyperbilirubinemia    Acute diverticulitis    Non-recurrent unilateral inguinal hernia without obstruction or gangrene    Diverticulitis    GALINDO (acute kidney injury) (H24)    Acute kidney injury (H24)    Nausea and vomiting, intractability " of vomiting not specified, unspecified vomiting type    Recurrent Clostridioides difficile diarrhea    Abdominal cramping    Emesis    Obesity (BMI 35.0-39.9) with comorbidity (H)    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 37.0 to 37.9 in adult    Serrated adenoma of colon    BPH with urinary obstruction    Urinary incontinence, unspecified type         Review of Systems        Objective    BP (!) 156/99   Pulse 71   Temp 97.9  F (36.6  C) (Oral)   Resp 18   Wt 115.2 kg (254 lb)   SpO2 95%   BMI 36.97 kg/m    Physical Exam   Pt is in no acute distress and appears well, no tachypnea, able to talk in full sentances.    Pharynx: non erythematous, tonsils non hypertrophied, No exudate   Neck supple: no adenopathy  Lungs: CTA  Heart: RRR, no murmur, no thrills or heaves   Ext: no edema  No pain with AP or lateral compression of the chest.  No TTP of the sternal boarder. Skin: no rashes      Results for orders placed or performed in visit on 12/15/23   EKG 12-lead, tracing only     Status: None (Preliminary result)   Result Value Ref Range    Systolic Blood Pressure  mmHg    Diastolic Blood Pressure  mmHg    Ventricular Rate 64 BPM    Atrial Rate 64 BPM    AR Interval 194 ms    QRS Duration 110 ms     ms    QTc 394 ms    P Axis 50 degrees    R AXIS -10 degrees    T Axis 25 degrees    Interpretation ECG       Sinus rhythm with Premature atrial complexes  Otherwise normal ECG  When compared with ECG of 16-FEB-2023 13:11,  No significant change was found

## 2023-12-15 NOTE — ED PROVIDER NOTES
EMERGENCY DEPARTMENT ENCOUNTER      NAME: Jay Roth  AGE: 68 year old male  YOB: 1955  MRN: [id number]  EVALUATION DATE & TIME: No admission date for patient encounter.    PCP: Aravind Villa    ED PROVIDER: Serena Yang MD    Chief Complaint   Patient presents with    Chest Pain         FINAL IMPRESSION:  1. Chest pain, unspecified type          ED COURSE & MEDICAL DECISION MAKING:    Pertinent Labs & Imaging studies reviewed. (See chart for details)  68 year old male with history of HTN, HLD, previous diverticulitis who presents to the Emergency Department for evaluation of left-sided pectoral chest pain both pressure with some sharp stabbing pain over the course the last 3 to 4 days, intermittent.  Differential includes ACS, atypical chest pain, PE, costochondritis.  His pain is somewhat reproducible on examination which favors a musculoskeletal etiology.  No ripping tearing sensation, radiation to the back.    Initially seen eval by myself in triage area due to boarding crisis.  Twelve-lead EKG shows sinus rhythm.  Given aspirin.  CBC, BMP, D-dimer and troponin obtained and unremarkable.  Troponin gender normal.  Chest x-ray unremarkable.  Patient has had previous provocative testing.  Per chart review this was 2020, unremarkable.  Given his age and risk factors it would be reasonable to follow-up for repeat provocative testing as an outpatient but no indications for admission at this time given reassuring ED workup.      ED Course as of 12/15/23 1511   Fri Dec 15, 2023   1240 D-Dimer Quantitative: 0.28   1307 Chest XR,  PA & LAT  Chest x-ray independently interpreted by myself.  No cardiomegaly infiltrate or effusion       Medical Decision Making    History:  Supplemental history from: Documented in chart, if applicable  External Record(s) reviewed: Outpatient Record: Urgent care visit today    Work Up:  Chart documentation includes differential considered and any EKGs or imaging  "independently interpreted by provider, see MDM  In additional to work up documented, I considered the following work up: see MDM    External consultation:  Discussion of management with another provider: N/A    Complicating factors:  Care impacted by chronic illness: Hyperlipidemia and Hypertension  Care affected by social determinants of health: Access to Medical Care referred to ED    Disposition considerations: Discharge. No recommendations on prescription strength medication(s). See documentation for any additional details.        At the conclusion of the encounter I discussed the results of all of the tests and the disposition. The questions were answered. The patient or family acknowledged understanding and was agreeable with the care plan.      MEDICATIONS GIVEN IN THE EMERGENCY:  Medications   aspirin (ASA) chewable tablet 324 mg (324 mg Oral $Given 12/15/23 1208)       NEW PRESCRIPTIONS STARTED AT TODAY'S ER VISIT  Discharge Medication List as of 12/15/2023  1:27 PM             =================================================================    HPI    Patient information was obtained from: patient    Use of Intrepreter: N/A        Jay Roth is a 68 year old male with pertinent medical history of HTN, HLD, pleurisy who presents with left-sided chest pain.    Patient reports 4-5 days of intermittent left pectoral pain with occasional radiation to left tricep. Denies any known provoking or pallating factors, stating that he would get onset of pain even while siting. Currently reports chest \"pressure\" to be a 4/10 in severity. Denies any recent travels, leg pin, swelling, chest pain, shortness of breath, nausea, vomiting, diaphoresis, or history of blood clots. Further denies having taken any medications for the symptoms today as they do not provide sufficient relief.       PAST MEDICAL HISTORY:  Past Medical History:   Diagnosis Date    GALINDO (acute kidney injury) (H24)     BPH (benign prostatic " hyperplasia)     Clostridium difficile colitis     h/o 10/19    Diverticulitis     GERD (gastroesophageal reflux disease)     H/O Bell's palsy     Hematuria     Hyperbilirubinemia     Hyperlipidemia     Hypertension     Insomnia     Lower back pain     Right inguinal hernia     Sleep apnea     Minor Sleep Apnea not CPAP       PAST SURGICAL HISTORY:  Past Surgical History:   Procedure Laterality Date    HC KNEE SCOPE,MED/LAT MENISCUS REPAIR      Description: Knee Arthroscopy With Medial Meniscus Repair;  Recorded: 02/11/2009;    HERNIA REPAIR      DE ESOPHAGOGASTRODUODENOSCOPY TRANSORAL DIAGNOSTIC N/A 12/26/2019    Procedure: ESOPHAGOGASTRODUODENOSCOPY (EGD) with biopsies;  Surgeon: Tamir Serna MD;  Location: [facility] GI;  Service: Gastroenterology    DE LAP,SURG,COLECTOMY, PARTIAL, W/ANAST N/A 12/5/2019    Procedure: LAPAROSCOPIC sIGMOIDECTOMY;  Surgeon: Isi Lyles MD;  Location: [facility] Main OR;  Service: General    SIGMOIDOSCOPY FLEXIBLE N/A 12/24/2019    Procedure: Flexible SIGMOIDOSCOPY;  Surgeon: Isi Lyles MD;  Location: [facility] GI;  Service: Gastroenterology    SIGMOIDOSCOPY FLEXIBLE N/A 12/27/2019    Procedure: Flexible SIGMOIDOSCOPY with biopsy;  Surgeon: Isi Lyles MD;  Location: [facility];  Service: Gastroenterology       CURRENT MEDICATIONS:    Prior to Admission Medications   Prescriptions Last Dose Informant Patient Reported? Taking?   amLODIPine (NORVASC) 5 MG tablet   No No   Sig: TAKE 1 TABLET(5 MG) BY MOUTH DAILY   augmented betamethasone dipropionate (DIPROLENE-AF) 0.05 % external ointment   No No   Sig: Apply topically sparingly to affected areas twice daily as needed   fish oil-omega-3 fatty acids 1000 MG capsule   Yes No   Sig: Take 2 g by mouth daily   medical cannabis (Patient's own supply)   Yes No   Sig: by Other route See Admin Instructions   multivitamin (ONE A DAY) per tablet   Yes No   Sig: Take 1 tablet by mouth daily   omeprazole  "(PRILOSEC) 20 MG DR capsule   No No   Sig: Take 1 capsule (20 mg) by mouth daily   pravastatin (PRAVACHOL) 20 MG tablet   No No   Sig: TAKE 1 TABLET(20 MG) BY MOUTH EVERY EVENING   tamsulosin (FLOMAX) 0.4 MG capsule   No No   Sig: TAKE 2 CAPSULES(0.8 MG) BY MOUTH DAILY      Facility-Administered Medications: None       ALLERGIES:  Allergies   Allergen Reactions    Atorvastatin Calcium [Atorvastatin] Dizziness       FAMILY HISTORY:  No family history on file.    SOCIAL HISTORY:  Social History     Tobacco Use    Smoking status: Former     Types: Cigarettes     Quit date: 1990     Years since quittin.9    Smokeless tobacco: Former     Types: Chew    Tobacco comments:     chew daily   Substance Use Topics    Alcohol use: Yes     Comment: Alcoholic Drinks/day: occasional    Drug use: Yes     Comment: Drug use: medical cannabis        VITALS:  Patient Vitals for the past 24 hrs:   BP Temp Temp src Pulse Resp SpO2 Height Weight   12/15/23 1333 (!) 144/80 -- -- 81 18 97 % -- --   12/15/23 1148 (!) 163/85 98.3  F (36.8  C) Temporal 85 18 95 % 1.778 m (5' 10\") 113.4 kg (250 lb)       PHYSICAL EXAM    General Appearance: Well-appearing, well-nourished, no acute distress   Head:  Normocephalic,  Neck:  Supple  Chest:  No tenderness or deformity, no crepitus Reproducible left inferior pectoral pain  Cardio:  Regular rate and rhythm, no murmur/gallop/rub  Pulm:  No respiratory distress, clear to auscultation bilaterally  Abdomen:  Soft, non-tender, obese  Extremities: Moves extremities normally, normal gait.  No peripheral edema  Skin:  Skin warm, dry, no rashes  Neuro:  Alert and oriented ×3, moving all extremities, no gross sensory defects     RADIOLOGY/LABS:  Reviewed all pertinent imaging. Please see official radiology report. All pertinent labs reviewed and interpreted.    Results for orders placed or performed during the hospital encounter of 12/15/23   Chest XR,  PA & LAT    Impression    IMPRESSION: Negative " chest.     Basic metabolic panel   Result Value Ref Range    Sodium 138 135 - 145 mmol/L    Potassium 4.5 3.4 - 5.3 mmol/L    Chloride 101 98 - 107 mmol/L    Carbon Dioxide (CO2) 27 22 - 29 mmol/L    Anion Gap 10 7 - 15 mmol/L    Urea Nitrogen 20.6 8.0 - 23.0 mg/dL    Creatinine 1.07 0.67 - 1.17 mg/dL    GFR Estimate 77 >60 mL/min/1.73m2    Calcium 10.1 8.8 - 10.2 mg/dL    Glucose 100 (H) 70 - 99 mg/dL   Result Value Ref Range    Troponin T, High Sensitivity 10 <=22 ng/L   D dimer quantitative   Result Value Ref Range    D-Dimer Quantitative 0.28 0.00 - 0.50 ug/mL FEU   CBC with platelets and differential   Result Value Ref Range    WBC Count 6.1 4.0 - 11.0 10e3/uL    RBC Count 5.77 4.40 - 5.90 10e6/uL    Hemoglobin 16.9 13.3 - 17.7 g/dL    Hematocrit 49.5 40.0 - 53.0 %    MCV 86 78 - 100 fL    MCH 29.3 26.5 - 33.0 pg    MCHC 34.1 31.5 - 36.5 g/dL    RDW 13.1 10.0 - 15.0 %    Platelet Count 188 150 - 450 10e3/uL    % Neutrophils 69 %    % Lymphocytes 18 %    % Monocytes 8 %    % Eosinophils 3 %    % Basophils 1 %    % Immature Granulocytes 1 %    NRBCs per 100 WBC 0 <1 /100    Absolute Neutrophils 4.2 1.6 - 8.3 10e3/uL    Absolute Lymphocytes 1.1 0.8 - 5.3 10e3/uL    Absolute Monocytes 0.5 0.0 - 1.3 10e3/uL    Absolute Eosinophils 0.2 0.0 - 0.7 10e3/uL    Absolute Basophils 0.1 0.0 - 0.2 10e3/uL    Absolute Immature Granulocytes 0.0 <=0.4 10e3/uL    Absolute NRBCs 0.0 10e3/uL   ECG 12-LEAD WITH MUSE (LHE)   Result Value Ref Range    Systolic Blood Pressure 128 mmHg    Diastolic Blood Pressure 69 mmHg    Ventricular Rate 66 BPM    Atrial Rate 66 BPM    CT Interval 190 ms    QRS Duration 106 ms     ms    QTc 389 ms    P Axis 44 degrees    R AXIS -17 degrees    T Axis 20 degrees    Interpretation ECG       Sinus rhythm with Premature atrial complexes  Cannot rule out Anterior infarct , age undetermined  Abnormal ECG  When compared with ECG of 15-DEC-2023 11:11,  No significant change was found  Confirmed by SEE  ED PROVIDER NOTE FOR, ECG INTERPRETATION ([id number]),  DONTA CASE ([id number]) on 12/15/2023 12:44:45 PM         EKG:  Performed at: 1153    Impression: Sinus rhythm with premature atrial complexes    Rate: 66+  Rhythm: Sinus  Axis: 44 -17 20  PA Interval: 190  QRS Interval: 106  QTc Interval: 389  ST Changes: None  Comparison: 15-Dec-2023  No significant change was found  I have independently reviewed and interpreted the EKG(s) documented above.      The creation of this record is based on the scribe s observations of the work being performed by Serena Yang MD and the provider s statements to them. It was created on her behalf by Swapnil Richardson, a trained medical scribe. This document has been checked and approved by the attending provider.    Serena Yang MD  Emergency Medicine  [facility] EMERGENCY ROOM  [facility] [facility] [id number]  [phone]  Dept: [phone]     Serena Yang MD  12/15/23 0171

## 2023-12-15 NOTE — ED TRIAGE NOTES
Pt arrives from  where they told him to come to the ED for CP. C/o intermittent, L sided CP radiating to the L side. Going on for 3-4 days. 4/10 pain. Denies major cardiac hx, states he has had a normal stress test in the past. Denies other symptoms. Provider at bedside in triage.      Triage Assessment (Adult)       Row Name 12/15/23 2500          Triage Assessment    Airway WDL WDL        Respiratory WDL    Respiratory WDL WDL        Skin Circulation/Temperature WDL    Skin Circulation/Temperature WDL WDL        Cardiac WDL    Cardiac WDL X;chest pain        Peripheral/Neurovascular WDL    Peripheral Neurovascular WDL WDL        Cognitive/Neuro/Behavioral WDL    Cognitive/Neuro/Behavioral WDL WDL

## 2023-12-18 ENCOUNTER — OFFICE VISIT (OUTPATIENT)
Dept: FAMILY MEDICINE | Facility: CLINIC | Age: 68
End: 2023-12-18
Payer: COMMERCIAL

## 2023-12-18 VITALS
BODY MASS INDEX: 35.87 KG/M2 | TEMPERATURE: 97.8 F | DIASTOLIC BLOOD PRESSURE: 102 MMHG | SYSTOLIC BLOOD PRESSURE: 168 MMHG | HEART RATE: 71 BPM | WEIGHT: 250 LBS | RESPIRATION RATE: 18 BRPM | OXYGEN SATURATION: 95 %

## 2023-12-18 DIAGNOSIS — R07.89 CHEST WALL PAIN: ICD-10-CM

## 2023-12-18 DIAGNOSIS — R07.9 CHEST PAIN, UNSPECIFIED TYPE: Primary | ICD-10-CM

## 2023-12-18 LAB
ATRIAL RATE - MUSE: 64 BPM
DIASTOLIC BLOOD PRESSURE - MUSE: NORMAL MMHG
INTERPRETATION ECG - MUSE: NORMAL
P AXIS - MUSE: 61 DEGREES
PR INTERVAL - MUSE: 194 MS
QRS DURATION - MUSE: 96 MS
QT - MUSE: 400 MS
QTC - MUSE: 412 MS
R AXIS - MUSE: -11 DEGREES
SYSTOLIC BLOOD PRESSURE - MUSE: NORMAL MMHG
T AXIS - MUSE: 18 DEGREES
VENTRICULAR RATE- MUSE: 64 BPM

## 2023-12-18 PROCEDURE — 93005 ELECTROCARDIOGRAM TRACING: CPT | Performed by: PHYSICIAN ASSISTANT

## 2023-12-18 PROCEDURE — 99214 OFFICE O/P EST MOD 30 MIN: CPT | Mod: 25 | Performed by: PHYSICIAN ASSISTANT

## 2023-12-18 PROCEDURE — 93010 ELECTROCARDIOGRAM REPORT: CPT | Performed by: INTERNAL MEDICINE

## 2023-12-18 RX ORDER — AMOXICILLIN 500 MG/1
TABLET, FILM COATED ORAL
COMMUNITY
Start: 2023-01-26

## 2023-12-18 RX ORDER — TRIAMCINOLONE ACETONIDE 1 MG/G
OINTMENT TOPICAL
COMMUNITY
Start: 2023-07-19

## 2023-12-18 RX ORDER — LIDOCAINE 4 G/G
1 PATCH TOPICAL EVERY 24 HOURS
Qty: 15 PATCH | Refills: 0 | Status: SHIPPED | OUTPATIENT
Start: 2023-12-18 | End: 2024-01-17

## 2023-12-18 NOTE — PROGRESS NOTES
Chief Complaint   Patient presents with    Chest Pain     Pain is 6-7        ASSESSMENT/PLAN:  Grzegorz was seen today for chest pain.    Diagnoses and all orders for this visit:    Chest pain, unspecified type  -     EKG 12-lead, tracing only    Chest wall pain    Patient's chest pain is reproducible on exam.  EKG is within normal limits without any significant change.  Recent thorough workup in the ER with negative troponin and D-dimer and no change in the characteristic of the pain makes me less suspicious for acute cardiac cause or MI.  Still considered a broad differential including PE, aortic dissection, pneumonia among others  Patient's questions answered today.  Will send in some lidocaine patches and recommend Max strength Tylenol.  Be sure to follow-up with cardiology.    Peter Padilla PA-C      SUBJECTIVE:  Jay is a 68 year old male with past medical history of hypertension, high cholesterol, obesity, who presents to urgent care with chest pain.  He was seen in the ER 3 days ago for this.  See below for summary.  He has a follow-up with cardiology in January.  He had an episode of worsening chest pain this morning.  It has been between 4 and 5 this morning was more 6 or 7.  It has improved since.  Does not get worse or better with exertion.  No shortness of breath, diaphoresis but the pain does radiate into his left armpit and trapezius at times.  He also has questions pertaining to the recent ER visit    Summary of the ER visit 3 days ago:   Twelve-lead EKG shows sinus rhythm.  Given aspirin.  CBC, BMP, D-dimer and troponin obtained and unremarkable.  Troponin gender normal.  Chest x-ray unremarkable.  Patient has had previous provocative testing.  Per chart review this was 2020, unremarkable.  Given his age and risk factors it would be reasonable to follow-up for repeat provocative testing as an outpatient but no indications for admission at this time given reassuring ED workup     ROS: Pertinent ROS  neg other than the symptoms noted above in the HPI.     OBJECTIVE:  BP (!) 168/102 (BP Location: Right arm, Patient Position: Sitting, Cuff Size: Adult Large)   Pulse 71   Temp 97.8  F (36.6  C) (Oral)   Resp 18   Wt 113.4 kg (250 lb)   SpO2 95%   BMI 35.87 kg/m     GENERAL: healthy, alert and no distress  RESP: lungs clear to auscultation - no rales, rhonchi or wheezes  CV: regular rate and rhythm, normal S1 S2, no S3 or S4, no murmur, click or rub, no peripheral edema and peripheral pulses strong  MS: Tenderness just anterior to the left axillary line along the inferior rib cage with point tenderness approximately the ninth rib.  No masses.  SKIN: no suspicious lesions or rashes  NEURO: Normal strength and tone, mentation intact and speech normal    DIAGNOSTICS  EKG - Reviewed and interpreted by me appears normal, NSR, PAC, unchanged from previous tracings  Results for orders placed or performed in visit on 12/18/23   EKG 12-lead, tracing only     Status: None (Preliminary result)   Result Value Ref Range    Systolic Blood Pressure  mmHg    Diastolic Blood Pressure  mmHg    Ventricular Rate 64 BPM    Atrial Rate 64 BPM    AK Interval 194 ms    QRS Duration 96 ms     ms    QTc 412 ms    P Axis 61 degrees    R AXIS -11 degrees    T Axis 18 degrees    Interpretation ECG       Sinus rhythm with Premature atrial complexes  Otherwise normal ECG  When compared with ECG of 15-DEC-2023 11:53,  No significant change was found          Current Outpatient Medications   Medication    amLODIPine (NORVASC) 5 MG tablet    amoxicillin (AMOXIL) 500 MG tablet    fish oil-omega-3 fatty acids 1000 MG capsule    medical cannabis (Patient's own supply)    multivitamin (ONE A DAY) per tablet    omeprazole (PRILOSEC) 20 MG DR capsule    pravastatin (PRAVACHOL) 20 MG tablet    tamsulosin (FLOMAX) 0.4 MG capsule    triamcinolone (KENALOG) 0.1 % external ointment     No current facility-administered medications for this visit.       Patient Active Problem List   Diagnosis    Smokeless tobacco use    Hyperlipidemia LDL goal <100    Benign Prostatic Hypertrophy    Cerumen Impaction    Lower Back Pain    Diverticulosis    Hematuria    JIGNA (obstructive sleep apnea)    Benign Essential Hypertension    Insomnia    Colon polyp    History of Bell's palsy    Hyperbilirubinemia    Acute diverticulitis    Non-recurrent unilateral inguinal hernia without obstruction or gangrene    Diverticulitis    GALINDO (acute kidney injury) (H24)    Acute kidney injury (H24)    Nausea and vomiting, intractability of vomiting not specified, unspecified vomiting type    Recurrent Clostridioides difficile diarrhea    Abdominal cramping    Emesis    Obesity (BMI 35.0-39.9) with comorbidity (H)    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 37.0 to 37.9 in adult    Serrated adenoma of colon    BPH with urinary obstruction    Urinary incontinence, unspecified type      Past Medical History:   Diagnosis Date    GALINDO (acute kidney injury) (H24)     BPH (benign prostatic hyperplasia)     Clostridium difficile colitis     h/o 10/19    Diverticulitis     GERD (gastroesophageal reflux disease)     H/O Bell's palsy     Hematuria     Hyperbilirubinemia     Hyperlipidemia     Hypertension     Insomnia     Lower back pain     Right inguinal hernia     Sleep apnea     Minor Sleep Apnea not CPAP     Past Surgical History:   Procedure Laterality Date    HC KNEE SCOPE,MED/LAT MENISCUS REPAIR      Description: Knee Arthroscopy With Medial Meniscus Repair;  Recorded: 02/11/2009;    HERNIA REPAIR      MD ESOPHAGOGASTRODUODENOSCOPY TRANSORAL DIAGNOSTIC N/A 12/26/2019    Procedure: ESOPHAGOGASTRODUODENOSCOPY (EGD) with biopsies;  Surgeon: Tamir Serna MD;  Location: [facility];  Service: Gastroenterology    MD LAP,SURG,COLECTOMY, PARTIAL, W/ANAST N/A 12/5/2019    Procedure: LAPAROSCOPIC sIGMOIDECTOMY;  Surgeon: Isi Lyles MD;  Location:  [facility] Main OR;  Service: General    SIGMOIDOSCOPY FLEXIBLE N/A 2019    Procedure: Flexible SIGMOIDOSCOPY;  Surgeon: Isi Lyles MD;  Location: [facility];  Service: Gastroenterology    SIGMOIDOSCOPY FLEXIBLE N/A 2019    Procedure: Flexible SIGMOIDOSCOPY with biopsy;  Surgeon: Isi Lyles MD;  Location: [facility];  Service: Gastroenterology     No family history on file.  Social History     Tobacco Use    Smoking status: Former     Types: Cigarettes     Quit date: 1990     Years since quittin.9    Smokeless tobacco: Former     Types: Chew    Tobacco comments:     chew daily   Substance Use Topics    Alcohol use: Yes     Comment: Alcoholic Drinks/day: occasional              The plan of care was discussed with the patient. They understand and agree with the course of treatment prescribed. A printed summary was given including instructions and medications.  The use of Dragon/[employer] dictation services may have been used to construct the content in this note; any grammatical or spelling errors are non-intentional. Please contact the author of this note directly if you are in need of any clarification.

## 2023-12-20 ENCOUNTER — TRANSFERRED RECORDS (OUTPATIENT)
Dept: HEALTH INFORMATION MANAGEMENT | Facility: CLINIC | Age: 68
End: 2023-12-20
Payer: COMMERCIAL

## 2023-12-21 ENCOUNTER — APPOINTMENT (OUTPATIENT)
Dept: URBAN - METROPOLITAN AREA CLINIC 260 | Age: 68
Setting detail: DERMATOLOGY
End: 2023-12-21

## 2023-12-21 VITALS — WEIGHT: 250 LBS | HEIGHT: 60 IN

## 2023-12-21 DIAGNOSIS — L57.0 ACTINIC KERATOSIS: ICD-10-CM

## 2023-12-21 DIAGNOSIS — D49.2 NEOPLASM OF UNSPECIFIED BEHAVIOR OF BONE, SOFT TISSUE, AND SKIN: ICD-10-CM

## 2023-12-21 PROCEDURE — 99213 OFFICE O/P EST LOW 20 MIN: CPT | Mod: 25

## 2023-12-21 PROCEDURE — 11102 TANGNTL BX SKIN SINGLE LES: CPT

## 2023-12-21 PROCEDURE — OTHER EDUCATIONAL RESOURCES PROVIDED: OTHER

## 2023-12-21 PROCEDURE — OTHER COUNSELING: OTHER

## 2023-12-21 PROCEDURE — OTHER BIOPSY BY SHAVE METHOD: OTHER

## 2023-12-21 PROCEDURE — OTHER MIPS QUALITY: OTHER

## 2023-12-21 PROCEDURE — OTHER ADDITIONAL NOTES: OTHER

## 2023-12-21 PROCEDURE — OTHER PHOTO-DOCUMENTATION: OTHER

## 2023-12-21 ASSESSMENT — LOCATION SIMPLE DESCRIPTION DERM
LOCATION SIMPLE: POSTERIOR NECK
LOCATION SIMPLE: LEFT CHEEK
LOCATION SIMPLE: RIGHT CHEEK

## 2023-12-21 ASSESSMENT — LOCATION ZONE DERM
LOCATION ZONE: FACE
LOCATION ZONE: NECK

## 2023-12-21 ASSESSMENT — LOCATION DETAILED DESCRIPTION DERM
LOCATION DETAILED: RIGHT MEDIAL TRAPEZIAL NECK
LOCATION DETAILED: RIGHT CENTRAL MALAR CHEEK
LOCATION DETAILED: LEFT CENTRAL MALAR CHEEK

## 2023-12-21 NOTE — PROCEDURE: BIOPSY BY SHAVE METHOD
Detail Level: Detailed
Depth Of Biopsy: dermis
Was A Bandage Applied: Yes
Size Of Lesion In Cm: 0
Biopsy Type: H and E
Biopsy Method: Dermablade
Anesthesia Type: 1% lidocaine with epinephrine
Anesthesia Volume In Cc: 0.5
Hemostasis: Drysol
Wound Care: Petrolatum
Dressing: bandage
Destruction After The Procedure: No
Type Of Destruction Used: Curettage
Curettage Text: The wound bed was treated with curettage after the biopsy was performed.
Cryotherapy Text: The wound bed was treated with cryotherapy after the biopsy was performed.
Electrodesiccation Text: The wound bed was treated with electrodesiccation after the biopsy was performed.
Electrodesiccation And Curettage Text: The wound bed was treated with electrodesiccation and curettage after the biopsy was performed.
Silver Nitrate Text: The wound bed was treated with silver nitrate after the biopsy was performed.
Lab: [id number]
Consent: Written consent was obtained and risks were reviewed including but not limited to scarring, infection, bleeding, scabbing, incomplete removal, nerve damage and allergy to anesthesia.
Post-Care Instructions: I reviewed with the patient in detail post-care instructions. Patient is to keep the biopsy site dry overnight, and then apply bacitracin twice daily until healed. Patient may apply hydrogen peroxide soaks to remove any crusting.
Notification Instructions: Patient will be notified of biopsy results. However, patient instructed to call the office if not contacted within 2 weeks.
Billing Type: Third-Party Bill
Information: Selecting Yes will display possible errors in your note based on the variables you have selected. This validation is only offered as a suggestion for you. PLEASE NOTE THAT THE VALIDATION TEXT WILL BE REMOVED WHEN YOU FINALIZE YOUR NOTE. IF YOU WANT TO FAX A PRELIMINARY NOTE YOU WILL NEED TO TOGGLE THIS TO 'NO' IF YOU DO NOT WANT IT IN YOUR FAXED NOTE.

## 2023-12-21 NOTE — PROCEDURE: ADDITIONAL NOTES
Additional Notes: Discussed restarting the 5FU topical in 3-4 months if he feels the precancers appearing. Follow up in 6 months for recheck\\nHe did PDT previously and felt them appear at 3-4 mo
Detail Level: Simple
Render Risk Assessment In Note?: no

## 2024-01-17 ENCOUNTER — OFFICE VISIT (OUTPATIENT)
Dept: CARDIOLOGY | Facility: CLINIC | Age: 69
End: 2024-01-17
Payer: COMMERCIAL

## 2024-01-17 ENCOUNTER — TELEPHONE (OUTPATIENT)
Dept: CARDIOLOGY | Facility: CLINIC | Age: 69
End: 2024-01-17

## 2024-01-17 VITALS
WEIGHT: 256.5 LBS | HEART RATE: 107 BPM | SYSTOLIC BLOOD PRESSURE: 124 MMHG | HEIGHT: 70 IN | RESPIRATION RATE: 20 BRPM | OXYGEN SATURATION: 93 % | DIASTOLIC BLOOD PRESSURE: 82 MMHG | BODY MASS INDEX: 36.72 KG/M2

## 2024-01-17 DIAGNOSIS — I10 PRIMARY HYPERTENSION: ICD-10-CM

## 2024-01-17 DIAGNOSIS — I25.10 CORONARY ARTERY CALCIFICATION SEEN ON CT SCAN: ICD-10-CM

## 2024-01-17 DIAGNOSIS — R07.1 CHEST PAIN ON BREATHING: Primary | ICD-10-CM

## 2024-01-17 DIAGNOSIS — E78.2 MIXED HYPERLIPIDEMIA: ICD-10-CM

## 2024-01-17 DIAGNOSIS — R07.9 CHEST PAIN: Primary | ICD-10-CM

## 2024-01-17 DIAGNOSIS — E66.812 CLASS 2 SEVERE OBESITY DUE TO EXCESS CALORIES WITH SERIOUS COMORBIDITY AND BODY MASS INDEX (BMI) OF 37.0 TO 37.9 IN ADULT (H): ICD-10-CM

## 2024-01-17 DIAGNOSIS — R07.1 CHEST PAIN ON BREATHING: ICD-10-CM

## 2024-01-17 DIAGNOSIS — E66.01 CLASS 2 SEVERE OBESITY DUE TO EXCESS CALORIES WITH SERIOUS COMORBIDITY AND BODY MASS INDEX (BMI) OF 37.0 TO 37.9 IN ADULT (H): ICD-10-CM

## 2024-01-17 LAB — CRP SERPL-MCNC: <3 MG/L

## 2024-01-17 PROCEDURE — 99204 OFFICE O/P NEW MOD 45 MIN: CPT | Performed by: INTERNAL MEDICINE

## 2024-01-17 PROCEDURE — 36415 COLL VENOUS BLD VENIPUNCTURE: CPT | Performed by: INTERNAL MEDICINE

## 2024-01-17 PROCEDURE — 86140 C-REACTIVE PROTEIN: CPT | Performed by: INTERNAL MEDICINE

## 2024-01-17 PROCEDURE — G2211 COMPLEX E/M VISIT ADD ON: HCPCS | Performed by: INTERNAL MEDICINE

## 2024-01-17 RX ORDER — CYCLOBENZAPRINE HCL 5 MG
5 TABLET ORAL 3 TIMES DAILY PRN
Status: ON HOLD | COMMUNITY
Start: 2023-12-20 | End: 2024-02-12

## 2024-01-17 NOTE — TELEPHONE ENCOUNTER
Verbal order from MAT for CORS poss PCI once returned from vacation. MC message sent to patient. Will look to arrange upon return. DOMINIQUE HERRING

## 2024-01-17 NOTE — PATIENT INSTRUCTIONS
Please contact direct nurses line Monday through Friday 8 AM to 5 PM @ [phone]    After-hours contact cardiology office at [phone].      Plan:   Check inflammatory markers to evaluate for inflammatory cause (pericarditis).   Plan for coronary angiogram in near future given ongoing intermittent chest pains  Recommend asprin 81 mg daily

## 2024-01-17 NOTE — LETTER
1/17/2024    Aravind Villa MD  [address]  [facility] [address]    RE: Jay Roth       Dear Colleague,     I had the pleasure of seeing Jay Roth in the [facility].    HEART CARE ENCOUNTER CONSULTATON [facility]  [phone]      Assessment/Recommendations   Assessment:   Intermittent chest pain.  Recurrent with exertion.  Pain is left-sided  2. Hypertension, relatively well-controlled  3. Hyperlipidemia LDL above goal at 113  4.  Moderate coronary calcification on CT scan  5.  Obesity, BMI 37  6.  History of GERD.  Per patient the symptoms do not feel like typical GERD symptoms    Plan:   1.  Patient had worsening chest pain during exercise on a stress echo and significant risk factors for coronary artery disease along with moderate coronary calcification on CT scan recommend elective coronary angiogram with possible left heart cath and possible percutaneous coronary intervention.    2.  Continue pravastatin, consider switching to rosuvastatin 40 mg daily if significant coronary disease noted.  Would target LDL to less than 70  3.  Aspirin 81 mg daily  4.  CRP was checked today which was normal.  This was to assess for pericarditis.         History of Present Illness/Subjective    HPI: Jay Roth is a 68 year old male hypertension, hyperlipidemia who presents to cardiology clinic in consultation for recurrent episodes of chest pain.    Patient's been noticing for last 2 months intermittent episodes of chest discomfort.  Pain is across to his chest lasting for hours at times.  Sometimes provoked with activity and exercise.  He did undergo echo stress testing which demonstrated no clear wall motion abnormalities with stress testing but he did have significant elevation in chest pain with activity and exertion on the treadmill.  He states he was stopped early on the treadmill.  Functional status with 6 METS which is low.    Long discussion  with the patient regarding his symptoms.  He has undergone a CT scan which is negative for pulmonary embolism.  It was noted that he had moderate coronary calcification which is quite diffuse.  He also has hypertension hyperlipidemia.  If symptoms or not provoked with food.  There is no positional component to his chest pain.  No recent trauma.  Chest pain is not reproducible with pressing across his chest    CT Chest: CORONARY ARTERY CALCIFICATION: Moderate.     Echocardiogram Results: Stress Echo 12/22/2023   1. The patient exercised for 6 minutes and 15 seconds on the  Harpreet protocol.     2. Above normal functional capacity for age.     3. The patient reported 1/10 sharp shooting chest pain at rest which increased to 4/10 at peak exercise. Patient denied any radiation. Immediate post exercise patient reported left sided face numbness and tingling which last 1 minute before   completely resolving. Left sided sharp shooting chest pain back to baseline at end of test.     4. no significant EKG changes.     5. Normal left ventricular systolic function with no regional wall motion abnormalities noted at rest.   LVEF 60%.     6. apex is somewhat foreshortened, Post stress, no wall motion abnormalities seen.   LV systolic size decreases and systolic function increases appropriately.     7. Normal stress echocardiogram.          Medical History  Surgical History Family History Social History   Past Medical History:   Diagnosis Date    GALINDO (acute kidney injury) (H24)     BPH (benign prostatic hyperplasia)     Clostridium difficile colitis     h/o 10/19    Diverticulitis     GERD (gastroesophageal reflux disease)     H/O Bell's palsy     Hematuria     Hyperbilirubinemia     Hyperlipidemia     Hypertension     Insomnia     Lower back pain     Right inguinal hernia     Sleep apnea     Minor Sleep Apnea not CPAP     Past Surgical History:   Procedure Laterality Date    HC KNEE SCOPE,MED/LAT MENISCUS REPAIR      Description:  Knee Arthroscopy With Medial Meniscus Repair;  Recorded: 2009;    HERNIA REPAIR      [place] ESOPHAGOGASTRODUODENOSCOPY TRANSORAL DIAGNOSTIC N/A 2019    Procedure: ESOPHAGOGASTRODUODENOSCOPY (EGD) with biopsies;  Surgeon: Tamir Serna MD;  Location: [facility];  Service: Gastroenterology    [place] LAP,SURG,COLECTOMY, PARTIAL, W/ANAST N/A 2019    Procedure: LAPAROSCOPIC sIGMOIDECTOMY;  Surgeon: Isi Lyles MD;  Location: [facility] Main OR;  Service: General    SIGMOIDOSCOPY FLEXIBLE N/A 2019    Procedure: Flexible SIGMOIDOSCOPY;  Surgeon: Isi Lyles MD;  Location: [facility] GI;  Service: Gastroenterology    SIGMOIDOSCOPY FLEXIBLE N/A 2019    Procedure: Flexible SIGMOIDOSCOPY with biopsy;  Surgeon: Isi Lyles MD;  Location: [facility];  Service: Gastroenterology     No family history on file.     Social History     Socioeconomic History    Marital status:      Spouse name: Not on file    Number of children: Not on file    Years of education: Not on file    Highest education level: Not on file   Occupational History    Not on file   Tobacco Use    Smoking status: Former     Types: Cigarettes     Quit date: 1990     Years since quittin.0    Smokeless tobacco: Former     Types: Chew    Tobacco comments:     chew daily   Substance and Sexual Activity    Alcohol use: Yes     Comment: Alcoholic Drinks/day: occasional    Drug use: Yes     Comment: Drug use: medical cannabis    Sexual activity: Yes     Partners: Female   Other Topics Concern    Not on file   Social History Narrative    Not on file     Social Determinants of Health     Financial Resource Strain: High Risk (10/14/2023)    Financial Resource Strain     Within the past 12 months, have you or your family members you live with been unable to get utilities (heat, electricity) when it was really needed?: Yes   Food Insecurity: Low Risk  (10/14/2023)    Food Insecurity     Within the past  12 months, did you worry that your food would run out before you got money to buy more?: No     Within the past 12 months, did the food you bought just not last and you didn t have money to get more?: No   Transportation Needs: Low Risk  (10/14/2023)    Transportation Needs     Within the past 12 months, has lack of transportation kept you from medical appointments, getting your medicines, non-medical meetings or appointments, work, or from getting things that you need?: No   Physical Activity: Not on file   Stress: Not on file   Social Connections: Not on file   Interpersonal Safety: Low Risk  (10/17/2023)    Interpersonal Safety     Do you feel physically and emotionally safe where you currently live?: Yes     Within the past 12 months, have you been hit, slapped, kicked or otherwise physically hurt by someone?: No     Within the past 12 months, have you been humiliated or emotionally abused in other ways by your partner or ex-partner?: No   Housing Stability: Low Risk  (10/14/2023)    Housing Stability     Do you have housing? : Yes     Are you worried about losing your housing?: No           Medications  Allergies   Current Outpatient Medications   Medication Sig Dispense Refill    amLODIPine (NORVASC) 5 MG tablet TAKE 1 TABLET(5 MG) BY MOUTH DAILY 90 tablet 2    amoxicillin (AMOXIL) 500 MG tablet TAKE 4 TABLETS BY MOUTH 1 HOUR BEFORE DENTAL APPOINTMENT      fish oil-omega-3 fatty acids 1000 MG capsule Take 2 g by mouth daily      medical cannabis (Patient's own supply) by Other route See Admin Instructions      multivitamin (ONE A DAY) per tablet Take 1 tablet by mouth daily      omeprazole (PRILOSEC) 20 MG DR capsule Take 1 capsule (20 mg) by mouth daily 30 capsule 5    pravastatin (PRAVACHOL) 20 MG tablet TAKE 1 TABLET(20 MG) BY MOUTH EVERY EVENING 90 tablet 2    tamsulosin (FLOMAX) 0.4 MG capsule TAKE 2 CAPSULES(0.8 MG) BY MOUTH DAILY 180 capsule 3    triamcinolone (KENALOG) 0.1 % external ointment APPLY TO  AFFECTED AREAS TWICE DAILY. TAPER WITH IMPROVEMENT      cyclobenzaprine (FLEXERIL) 5 MG tablet Take 5 mg by mouth 3 times daily as needed (Patient not taking: Reported on 1/17/2024)         Allergies   Allergen Reactions    Atorvastatin Calcium [Atorvastatin] Dizziness          Lab Results    Chemistry/lipid CBC Cardiac Enzymes/BNP/TSH/INR   Recent Labs   Lab Test 10/17/23  0823   CHOL 206*   HDL 54   *   TRIG 195*     Recent Labs   Lab Test 10/17/23  0823 10/14/22  0733 10/08/21  0802   * 103* 106     Recent Labs   Lab Test 12/15/23  1213      POTASSIUM 4.5   CHLORIDE 101   CO2 27   *   BUN 20.6   CR 1.07   GFRESTIMATED 77   JAYME 10.1     Recent Labs   Lab Test 12/15/23  1213 10/17/23  0823 02/20/23  1438   CR 1.07 1.11 1.05     Recent Labs   Lab Test 10/14/22  0733   A1C 5.0          Recent Labs   Lab Test 12/15/23  1213   WBC 6.1   HGB 16.9   HCT 49.5   MCV 86        Recent Labs   Lab Test 12/15/23  1213 02/20/23  1438 10/14/22  0733   HGB 16.9 18.0* 16.9    Recent Labs   Lab Test 02/20/23  1438 11/09/20  1702 11/09/20  1446   TROPONINI 0.01 <0.01 <0.01     Recent Labs   Lab Test 11/09/20  1446   BNP 25     Recent Labs   Lab Test 11/09/20  1702   TSH 2.02     Recent Labs   Lab Test 01/28/20  0612 01/24/20  0617 01/07/20  0657   INR 1.12* 1.09 1.09        Dino Prajapati DO      Date of service is 1/17/2024.  Time spent with patient on 1/17/2024 was 55 minutes.  This was to review echo stress test, review labs, discuss echo stress testing in detail with the patient.  Extensive review of his symptoms.  Reviewed coronary angiogram, CT angiogram.  Discussed risk and benefits of both procedures.  Discussed risk and benefits of coronary angiogram.  Discussed coronary intervention.  Discussed noncardiac causes.  Discussed pericarditis.  Discussed testing for pericarditis including CRP levels.    Patient will require ongoing longitudinal management of his condition.  He needs further  adjustments of medications to improve lipid panel.  Will follow-up with results of coronary angiogram and further treatment and management strategies.      Thank you for allowing me to participate in the care of your patient.      Sincerely,     DO NANCY [facility]  cc:   No referring provider defined for this encounter.

## 2024-01-17 NOTE — PROGRESS NOTES
HEART CARE ENCOUNTER CONSULTATON NOTE      [facility]  [phone]      Assessment/Recommendations   Assessment:   Intermittent chest pain.  Recurrent with exertion.  Pain is left-sided  2. Hypertension, relatively well-controlled  3. Hyperlipidemia LDL above goal at 113  4.  Moderate coronary calcification on CT scan  5.  Obesity, BMI 37  6.  History of GERD.  Per patient the symptoms do not feel like typical GERD symptoms    Plan:   1.  Patient had worsening chest pain during exercise on a stress echo and significant risk factors for coronary artery disease along with moderate coronary calcification on CT scan recommend elective coronary angiogram with possible left heart cath and possible percutaneous coronary intervention.    2.  Continue pravastatin, consider switching to rosuvastatin 40 mg daily if significant coronary disease noted.  Would target LDL to less than 70  3.  Aspirin 81 mg daily  4.  CRP was checked today which was normal.  This was to assess for pericarditis.         History of Present Illness/Subjective    HPI: Jay Roth is a 68 year old male hypertension, hyperlipidemia who presents to cardiology clinic in consultation for recurrent episodes of chest pain.    Patient's been noticing for last 2 months intermittent episodes of chest discomfort.  Pain is across to his chest lasting for hours at times.  Sometimes provoked with activity and exercise.  He did undergo echo stress testing which demonstrated no clear wall motion abnormalities with stress testing but he did have significant elevation in chest pain with activity and exertion on the treadmill.  He states he was stopped early on the treadmill.  Functional status with 6 METS which is low.    Long discussion with the patient regarding his symptoms.  He has undergone a CT scan which is negative for pulmonary embolism.  It was noted that he had moderate coronary calcification which is quite diffuse.  He also has  hypertension hyperlipidemia.  If symptoms or not provoked with food.  There is no positional component to his chest pain.  No recent trauma.  Chest pain is not reproducible with pressing across his chest    CT Chest: CORONARY ARTERY CALCIFICATION: Moderate.     Echocardiogram Results: Stress Echo 12/22/2023   1. The patient exercised for 6 minutes and 15 seconds on the  Harpreet protocol.     2. Above normal functional capacity for age.     3. The patient reported 1/10 sharp shooting chest pain at rest which increased to 4/10 at peak exercise. Patient denied any radiation. Immediate post exercise patient reported left sided face numbness and tingling which last 1 minute before   completely resolving. Left sided sharp shooting chest pain back to baseline at end of test.     4. no significant EKG changes.     5. Normal left ventricular systolic function with no regional wall motion abnormalities noted at rest.   LVEF 60%.     6. apex is somewhat foreshortened, Post stress, no wall motion abnormalities seen.   LV systolic size decreases and systolic function increases appropriately.     7. Normal stress echocardiogram.          Medical History  Surgical History Family History Social History   Past Medical History:   Diagnosis Date    GALINDO (acute kidney injury) (H24)     BPH (benign prostatic hyperplasia)     Clostridium difficile colitis     h/o 10/19    Diverticulitis     GERD (gastroesophageal reflux disease)     H/O Bell's palsy     Hematuria     Hyperbilirubinemia     Hyperlipidemia     Hypertension     Insomnia     Lower back pain     Right inguinal hernia     Sleep apnea     Minor Sleep Apnea not CPAP     Past Surgical History:   Procedure Laterality Date    HC KNEE SCOPE,MED/LAT MENISCUS REPAIR      Description: Knee Arthroscopy With Medial Meniscus Repair;  Recorded: 02/11/2009;    HERNIA REPAIR      [place] ESOPHAGOGASTRODUODENOSCOPY TRANSORAL DIAGNOSTIC N/A 12/26/2019    Procedure: ESOPHAGOGASTRODUODENOSCOPY (EGD)  with biopsies;  Surgeon: Tamir Serna MD;  Location: [facility];  Service: Gastroenterology    [place] LAP,SURG,COLECTOMY, PARTIAL, W/ANAST N/A 2019    Procedure: LAPAROSCOPIC sIGMOIDECTOMY;  Surgeon: Isi Lyles MD;  Location: [facility] Main OR;  Service: General    SIGMOIDOSCOPY FLEXIBLE N/A 2019    Procedure: Flexible SIGMOIDOSCOPY;  Surgeon: Isi Lyles MD;  Location: [facility] GI;  Service: Gastroenterology    SIGMOIDOSCOPY FLEXIBLE N/A 2019    Procedure: Flexible SIGMOIDOSCOPY with biopsy;  Surgeon: Isi Lyles MD;  Location: [facility];  Service: Gastroenterology     No family history on file.     Social History     Socioeconomic History    Marital status:      Spouse name: Not on file    Number of children: Not on file    Years of education: Not on file    Highest education level: Not on file   Occupational History    Not on file   Tobacco Use    Smoking status: Former     Types: Cigarettes     Quit date: 1990     Years since quittin.0    Smokeless tobacco: Former     Types: Chew    Tobacco comments:     chew daily   Substance and Sexual Activity    Alcohol use: Yes     Comment: Alcoholic Drinks/day: occasional    Drug use: Yes     Comment: Drug use: medical cannabis    Sexual activity: Yes     Partners: Female   Other Topics Concern    Not on file   Social History Narrative    Not on file     Social Determinants of Health     Financial Resource Strain: High Risk (10/14/2023)    Financial Resource Strain     Within the past 12 months, have you or your family members you live with been unable to get utilities (heat, electricity) when it was really needed?: Yes   Food Insecurity: Low Risk  (10/14/2023)    Food Insecurity     Within the past 12 months, did you worry that your food would run out before you got money to buy more?: No     Within the past 12 months, did the food you bought just not last and you didn t have money to get more?: No    Transportation Needs: Low Risk  (10/14/2023)    Transportation Needs     Within the past 12 months, has lack of transportation kept you from medical appointments, getting your medicines, non-medical meetings or appointments, work, or from getting things that you need?: No   Physical Activity: Not on file   Stress: Not on file   Social Connections: Not on file   Interpersonal Safety: Low Risk  (10/17/2023)    Interpersonal Safety     Do you feel physically and emotionally safe where you currently live?: Yes     Within the past 12 months, have you been hit, slapped, kicked or otherwise physically hurt by someone?: No     Within the past 12 months, have you been humiliated or emotionally abused in other ways by your partner or ex-partner?: No   Housing Stability: Low Risk  (10/14/2023)    Housing Stability     Do you have housing? : Yes     Are you worried about losing your housing?: No           Medications  Allergies   Current Outpatient Medications   Medication Sig Dispense Refill    amLODIPine (NORVASC) 5 MG tablet TAKE 1 TABLET(5 MG) BY MOUTH DAILY 90 tablet 2    amoxicillin (AMOXIL) 500 MG tablet TAKE 4 TABLETS BY MOUTH 1 HOUR BEFORE DENTAL APPOINTMENT      fish oil-omega-3 fatty acids 1000 MG capsule Take 2 g by mouth daily      medical cannabis (Patient's own supply) by Other route See Admin Instructions      multivitamin (ONE A DAY) per tablet Take 1 tablet by mouth daily      omeprazole (PRILOSEC) 20 MG DR capsule Take 1 capsule (20 mg) by mouth daily 30 capsule 5    pravastatin (PRAVACHOL) 20 MG tablet TAKE 1 TABLET(20 MG) BY MOUTH EVERY EVENING 90 tablet 2    tamsulosin (FLOMAX) 0.4 MG capsule TAKE 2 CAPSULES(0.8 MG) BY MOUTH DAILY 180 capsule 3    triamcinolone (KENALOG) 0.1 % external ointment APPLY TO AFFECTED AREAS TWICE DAILY. TAPER WITH IMPROVEMENT      cyclobenzaprine (FLEXERIL) 5 MG tablet Take 5 mg by mouth 3 times daily as needed (Patient not taking: Reported on 1/17/2024)         Allergies    Allergen Reactions    Atorvastatin Calcium [Atorvastatin] Dizziness          Lab Results    Chemistry/lipid CBC Cardiac Enzymes/BNP/TSH/INR   Recent Labs   Lab Test 10/17/23  0823   CHOL 206*   HDL 54   *   TRIG 195*     Recent Labs   Lab Test 10/17/23  0823 10/14/22  0733 10/08/21  0802   * 103* 106     Recent Labs   Lab Test 12/15/23  1213      POTASSIUM 4.5   CHLORIDE 101   CO2 27   *   BUN 20.6   CR 1.07   GFRESTIMATED 77   JAYME 10.1     Recent Labs   Lab Test 12/15/23  1213 10/17/23  0823 02/20/23  1438   CR 1.07 1.11 1.05     Recent Labs   Lab Test 10/14/22  0733   A1C 5.0          Recent Labs   Lab Test 12/15/23  1213   WBC 6.1   HGB 16.9   HCT 49.5   MCV 86        Recent Labs   Lab Test 12/15/23  1213 02/20/23  1438 10/14/22  0733   HGB 16.9 18.0* 16.9    Recent Labs   Lab Test 02/20/23  1438 11/09/20  1702 11/09/20  1446   TROPONINI 0.01 <0.01 <0.01     Recent Labs   Lab Test 11/09/20  1446   BNP 25     Recent Labs   Lab Test 11/09/20  1702   TSH 2.02     Recent Labs   Lab Test 01/28/20  0612 01/24/20  0617 01/07/20  0657   INR 1.12* 1.09 1.09        Dino Prajapati DO      Date of service is 1/17/2024.  Time spent with patient on 1/17/2024 was 55 minutes.  This was to review echo stress test, review labs, discuss echo stress testing in detail with the patient.  Extensive review of his symptoms.  Reviewed coronary angiogram, CT angiogram.  Discussed risk and benefits of both procedures.  Discussed risk and benefits of coronary angiogram.  Discussed coronary intervention.  Discussed noncardiac causes.  Discussed pericarditis.  Discussed testing for pericarditis including CRP levels.    Patient will require ongoing longitudinal management of his condition.  He needs further adjustments of medications to improve lipid panel.  Will follow-up with results of coronary angiogram and further treatment and management strategies.

## 2024-01-17 NOTE — H&P (VIEW-ONLY)
HEART CARE ENCOUNTER CONSULTATON NOTE      [facility]  [phone]      Assessment/Recommendations   Assessment:   Intermittent chest pain.  Recurrent with exertion.  Pain is left-sided  2. Hypertension, relatively well-controlled  3. Hyperlipidemia LDL above goal at 113  4.  Moderate coronary calcification on CT scan  5.  Obesity, BMI 37  6.  History of GERD.  Per patient the symptoms do not feel like typical GERD symptoms    Plan:   1.  Patient had worsening chest pain during exercise on a stress echo and significant risk factors for coronary artery disease along with moderate coronary calcification on CT scan recommend elective coronary angiogram with possible left heart cath and possible percutaneous coronary intervention.    2.  Continue pravastatin, consider switching to rosuvastatin 40 mg daily if significant coronary disease noted.  Would target LDL to less than 70  3.  Aspirin 81 mg daily  4.  CRP was checked today which was normal.  This was to assess for pericarditis.         History of Present Illness/Subjective    HPI: Jay Roth is a 68 year old male hypertension, hyperlipidemia who presents to cardiology clinic in consultation for recurrent episodes of chest pain.    Patient's been noticing for last 2 months intermittent episodes of chest discomfort.  Pain is across to his chest lasting for hours at times.  Sometimes provoked with activity and exercise.  He did undergo echo stress testing which demonstrated no clear wall motion abnormalities with stress testing but he did have significant elevation in chest pain with activity and exertion on the treadmill.  He states he was stopped early on the treadmill.  Functional status with 6 METS which is low.    Long discussion with the patient regarding his symptoms.  He has undergone a CT scan which is negative for pulmonary embolism.  It was noted that he had moderate coronary calcification which is quite diffuse.  He also has  hypertension hyperlipidemia.  If symptoms or not provoked with food.  There is no positional component to his chest pain.  No recent trauma.  Chest pain is not reproducible with pressing across his chest    CT Chest: CORONARY ARTERY CALCIFICATION: Moderate.     Echocardiogram Results: Stress Echo 12/22/2023   1. The patient exercised for 6 minutes and 15 seconds on the  Harpreet protocol.     2. Above normal functional capacity for age.     3. The patient reported 1/10 sharp shooting chest pain at rest which increased to 4/10 at peak exercise. Patient denied any radiation. Immediate post exercise patient reported left sided face numbness and tingling which last 1 minute before   completely resolving. Left sided sharp shooting chest pain back to baseline at end of test.     4. no significant EKG changes.     5. Normal left ventricular systolic function with no regional wall motion abnormalities noted at rest.   LVEF 60%.     6. apex is somewhat foreshortened, Post stress, no wall motion abnormalities seen.   LV systolic size decreases and systolic function increases appropriately.     7. Normal stress echocardiogram.          Medical History  Surgical History Family History Social History   Past Medical History:   Diagnosis Date    GALINDO (acute kidney injury) (H24)     BPH (benign prostatic hyperplasia)     Clostridium difficile colitis     h/o 10/19    Diverticulitis     GERD (gastroesophageal reflux disease)     H/O Bell's palsy     Hematuria     Hyperbilirubinemia     Hyperlipidemia     Hypertension     Insomnia     Lower back pain     Right inguinal hernia     Sleep apnea     Minor Sleep Apnea not CPAP     Past Surgical History:   Procedure Laterality Date    HC KNEE SCOPE,MED/LAT MENISCUS REPAIR      Description: Knee Arthroscopy With Medial Meniscus Repair;  Recorded: 02/11/2009;    HERNIA REPAIR      VA ESOPHAGOGASTRODUODENOSCOPY TRANSORAL DIAGNOSTIC N/A 12/26/2019    Procedure: ESOPHAGOGASTRODUODENOSCOPY (EGD)  with biopsies;  Surgeon: Tamir Serna MD;  Location: [facility];  Service: Gastroenterology    [place] LAP,SURG,COLECTOMY, PARTIAL, W/ANAST N/A 2019    Procedure: LAPAROSCOPIC sIGMOIDECTOMY;  Surgeon: Isi Lyles MD;  Location: [facility] OR;  Service: General    SIGMOIDOSCOPY FLEXIBLE N/A 2019    Procedure: Flexible SIGMOIDOSCOPY;  Surgeon: Isi Lyles MD;  Location: [facility];  Service: Gastroenterology    SIGMOIDOSCOPY FLEXIBLE N/A 2019    Procedure: Flexible SIGMOIDOSCOPY with biopsy;  Surgeon: Isi Lyles MD;  Location: [facility];  Service: Gastroenterology     No family history on file.     Social History     Socioeconomic History    Marital status:      Spouse name: Not on file    Number of children: Not on file    Years of education: Not on file    Highest education level: Not on file   Occupational History    Not on file   Tobacco Use    Smoking status: Former     Types: Cigarettes     Quit date: 1990     Years since quittin.0    Smokeless tobacco: Former     Types: Chew    Tobacco comments:     chew daily   Substance and Sexual Activity    Alcohol use: Yes     Comment: Alcoholic Drinks/day: occasional    Drug use: Yes     Comment: Drug use: medical cannabis    Sexual activity: Yes     Partners: Female   Other Topics Concern    Not on file   Social History Narrative    Not on file     Social Determinants of Health     Financial Resource Strain: High Risk (10/14/2023)    Financial Resource Strain     Within the past 12 months, have you or your family members you live with been unable to get utilities (heat, electricity) when it was really needed?: Yes   Food Insecurity: Low Risk  (10/14/2023)    Food Insecurity     Within the past 12 months, did you worry that your food would run out before you got money to buy more?: No     Within the past 12 months, did the food you bought just not last and you didn t have money to get more?: No    Transportation Needs: Low Risk  (10/14/2023)    Transportation Needs     Within the past 12 months, has lack of transportation kept you from medical appointments, getting your medicines, non-medical meetings or appointments, work, or from getting things that you need?: No   Physical Activity: Not on file   Stress: Not on file   Social Connections: Not on file   Interpersonal Safety: Low Risk  (10/17/2023)    Interpersonal Safety     Do you feel physically and emotionally safe where you currently live?: Yes     Within the past 12 months, have you been hit, slapped, kicked or otherwise physically hurt by someone?: No     Within the past 12 months, have you been humiliated or emotionally abused in other ways by your partner or ex-partner?: No   Housing Stability: Low Risk  (10/14/2023)    Housing Stability     Do you have housing? : Yes     Are you worried about losing your housing?: No           Medications  Allergies   Current Outpatient Medications   Medication Sig Dispense Refill    amLODIPine (NORVASC) 5 MG tablet TAKE 1 TABLET(5 MG) BY MOUTH DAILY 90 tablet 2    amoxicillin (AMOXIL) 500 MG tablet TAKE 4 TABLETS BY MOUTH 1 HOUR BEFORE DENTAL APPOINTMENT      fish oil-omega-3 fatty acids 1000 MG capsule Take 2 g by mouth daily      medical cannabis (Patient's own supply) by Other route See Admin Instructions      multivitamin (ONE A DAY) per tablet Take 1 tablet by mouth daily      omeprazole (PRILOSEC) 20 MG DR capsule Take 1 capsule (20 mg) by mouth daily 30 capsule 5    pravastatin (PRAVACHOL) 20 MG tablet TAKE 1 TABLET(20 MG) BY MOUTH EVERY EVENING 90 tablet 2    tamsulosin (FLOMAX) 0.4 MG capsule TAKE 2 CAPSULES(0.8 MG) BY MOUTH DAILY 180 capsule 3    triamcinolone (KENALOG) 0.1 % external ointment APPLY TO AFFECTED AREAS TWICE DAILY. TAPER WITH IMPROVEMENT      cyclobenzaprine (FLEXERIL) 5 MG tablet Take 5 mg by mouth 3 times daily as needed (Patient not taking: Reported on 1/17/2024)         Allergies    Allergen Reactions    Atorvastatin Calcium [Atorvastatin] Dizziness          Lab Results    Chemistry/lipid CBC Cardiac Enzymes/BNP/TSH/INR   Recent Labs   Lab Test 10/17/23  0823   CHOL 206*   HDL 54   *   TRIG 195*     Recent Labs   Lab Test 10/17/23  0823 10/14/22  0733 10/08/21  0802   * 103* 106     Recent Labs   Lab Test 12/15/23  1213      POTASSIUM 4.5   CHLORIDE 101   CO2 27   *   BUN 20.6   CR 1.07   GFRESTIMATED 77   JAYME 10.1     Recent Labs   Lab Test 12/15/23  1213 10/17/23  0823 02/20/23  1438   CR 1.07 1.11 1.05     Recent Labs   Lab Test 10/14/22  0733   A1C 5.0          Recent Labs   Lab Test 12/15/23  1213   WBC 6.1   HGB 16.9   HCT 49.5   MCV 86        Recent Labs   Lab Test 12/15/23  1213 02/20/23  1438 10/14/22  0733   HGB 16.9 18.0* 16.9    Recent Labs   Lab Test 02/20/23  1438 11/09/20  1702 11/09/20  1446   TROPONINI 0.01 <0.01 <0.01     Recent Labs   Lab Test 11/09/20  1446   BNP 25     Recent Labs   Lab Test 11/09/20  1702   TSH 2.02     Recent Labs   Lab Test 01/28/20  0612 01/24/20  0617 01/07/20  0657   INR 1.12* 1.09 1.09        Dino Prajapati DO      Date of service is 1/17/2024.  Time spent with patient on 1/17/2024 was 55 minutes.  This was to review echo stress test, review labs, discuss echo stress testing in detail with the patient.  Extensive review of his symptoms.  Reviewed coronary angiogram, CT angiogram.  Discussed risk and benefits of both procedures.  Discussed risk and benefits of coronary angiogram.  Discussed coronary intervention.  Discussed noncardiac causes.  Discussed pericarditis.  Discussed testing for pericarditis including CRP levels.    Patient will require ongoing longitudinal management of his condition.  He needs further adjustments of medications to improve lipid panel.  Will follow-up with results of coronary angiogram and further treatment and management strategies.

## 2024-01-18 NOTE — TELEPHONE ENCOUNTER
Incoming call from patient, LVM to inform that he is boarding a plane for [place], will be returned to [place] 2/1/24. NEVAEH,Rn

## 2024-01-31 NOTE — TELEPHONE ENCOUNTER
Elsie Grant Christian RN  Case type: CORS Poss PCI  Procedure Physician(s): ENRIQUETA  Procedure Date and Patient Arrival Time: Monday 2/12/24, with arrival time of 0630  H&P: Completed on 1/17/14  Pre-Procedure Lab Appt: Friday 2/9/24 at  - Please place lab orders if you haven't already!  Alerts/Important Info: JIGNA  Interpretor Requested: n/a    Thank you,  Elsie

## 2024-01-31 NOTE — TELEPHONE ENCOUNTER
Incoming call from patient. Discussion of procedure. Pre-procedure questions checked. Pt agreeable to procedure. Case request and pre-labs entered. Staff message sent to procedural .     Case Type: CORS poss PCI  Ordering Provider: MIGUELINA  H&P: 1/17/24  Alerts: JIGNA,   Additional Info/Urgency: next available  Orders for Pre-Procedure Labs? Yes  Teach: NEVAEH,RN

## 2024-02-08 LAB
ABO/RH(D): NORMAL
ANTIBODY SCREEN: NEGATIVE
SPECIMEN EXPIRATION DATE: NORMAL

## 2024-02-09 ENCOUNTER — PREP FOR PROCEDURE (OUTPATIENT)
Dept: CARDIOLOGY | Facility: CLINIC | Age: 69
End: 2024-02-09

## 2024-02-09 ENCOUNTER — APPOINTMENT (OUTPATIENT)
Dept: URBAN - METROPOLITAN AREA CLINIC 260 | Age: 69
Setting detail: DERMATOLOGY
End: 2024-02-10

## 2024-02-09 ENCOUNTER — LAB (OUTPATIENT)
Dept: CARDIOLOGY | Facility: CLINIC | Age: 69
End: 2024-02-09
Payer: COMMERCIAL

## 2024-02-09 DIAGNOSIS — I25.10 CORONARY ARTERY CALCIFICATION SEEN ON CT SCAN: ICD-10-CM

## 2024-02-09 DIAGNOSIS — R07.9 CHEST PAIN: ICD-10-CM

## 2024-02-09 DIAGNOSIS — I10 PRIMARY HYPERTENSION: ICD-10-CM

## 2024-02-09 DIAGNOSIS — R07.1 CHEST PAIN ON BREATHING: ICD-10-CM

## 2024-02-09 DIAGNOSIS — E78.2 MIXED HYPERLIPIDEMIA: ICD-10-CM

## 2024-02-09 DIAGNOSIS — I25.10 CORONARY ARTERY CALCIFICATION SEEN ON CT SCAN: Primary | ICD-10-CM

## 2024-02-09 PROBLEM — C44.41 BASAL CELL CARCINOMA OF SKIN OF SCALP AND NECK: Status: ACTIVE | Noted: 2024-02-09

## 2024-02-09 LAB
ANION GAP SERPL CALCULATED.3IONS-SCNC: 8 MMOL/L (ref 7–15)
BUN SERPL-MCNC: 17.2 MG/DL (ref 8–23)
CALCIUM SERPL-MCNC: 9.1 MG/DL (ref 8.8–10.2)
CHLORIDE SERPL-SCNC: 102 MMOL/L (ref 98–107)
CHOLEST SERPL-MCNC: 206 MG/DL
CREAT SERPL-MCNC: 1.04 MG/DL (ref 0.67–1.17)
DEPRECATED HCO3 PLAS-SCNC: 30 MMOL/L (ref 22–29)
EGFRCR SERPLBLD CKD-EPI 2021: 79 ML/MIN/1.73M2
ERYTHROCYTE [DISTWIDTH] IN BLOOD BY AUTOMATED COUNT: 14.3 % (ref 10–15)
FASTING STATUS PATIENT QL REPORTED: YES
GLUCOSE SERPL-MCNC: 83 MG/DL (ref 70–99)
HCT VFR BLD AUTO: 50.5 % (ref 40–53)
HDLC SERPL-MCNC: 53 MG/DL
HGB BLD-MCNC: 16.9 G/DL (ref 13.3–17.7)
LDLC SERPL CALC-MCNC: 119 MG/DL
MCH RBC QN AUTO: 29.8 PG (ref 26.5–33)
MCHC RBC AUTO-ENTMCNC: 33.5 G/DL (ref 31.5–36.5)
MCV RBC AUTO: 89 FL (ref 78–100)
NONHDLC SERPL-MCNC: 153 MG/DL
PLATELET # BLD AUTO: 203 10E3/UL (ref 150–450)
POTASSIUM SERPL-SCNC: 4.2 MMOL/L (ref 3.4–5.3)
RBC # BLD AUTO: 5.67 10E6/UL (ref 4.4–5.9)
SODIUM SERPL-SCNC: 140 MMOL/L (ref 135–145)
TRIGL SERPL-MCNC: 170 MG/DL
WBC # BLD AUTO: 5.8 10E3/UL (ref 4–11)

## 2024-02-09 PROCEDURE — 86850 RBC ANTIBODY SCREEN: CPT

## 2024-02-09 PROCEDURE — OTHER PHOTO-DOCUMENTATION: OTHER

## 2024-02-09 PROCEDURE — 80061 LIPID PANEL: CPT

## 2024-02-09 PROCEDURE — 86900 BLOOD TYPING SEROLOGIC ABO: CPT

## 2024-02-09 PROCEDURE — OTHER MIPS QUALITY: OTHER

## 2024-02-09 PROCEDURE — 11622 EXC S/N/H/F/G MAL+MRG 1.1-2: CPT

## 2024-02-09 PROCEDURE — 36415 COLL VENOUS BLD VENIPUNCTURE: CPT

## 2024-02-09 PROCEDURE — 80048 BASIC METABOLIC PNL TOTAL CA: CPT

## 2024-02-09 PROCEDURE — 85027 COMPLETE CBC AUTOMATED: CPT

## 2024-02-09 PROCEDURE — 12042 INTMD RPR N-HF/GENIT2.6-7.5: CPT

## 2024-02-09 PROCEDURE — OTHER EXCISION: OTHER

## 2024-02-09 PROCEDURE — 86901 BLOOD TYPING SEROLOGIC RH(D): CPT

## 2024-02-09 PROCEDURE — OTHER COUNSELING: OTHER

## 2024-02-09 RX ORDER — SODIUM CHLORIDE 9 MG/ML
INJECTION, SOLUTION INTRAVENOUS CONTINUOUS
Status: CANCELLED | OUTPATIENT
Start: 2024-02-12

## 2024-02-09 RX ORDER — ASPIRIN 81 MG/1
243 TABLET, CHEWABLE ORAL ONCE
Status: CANCELLED | OUTPATIENT
Start: 2024-02-12

## 2024-02-09 RX ORDER — ASPIRIN 325 MG
325 TABLET ORAL ONCE
Status: CANCELLED | OUTPATIENT
Start: 2024-02-12 | End: 2024-02-09

## 2024-02-09 RX ORDER — FENTANYL CITRATE 50 UG/ML
25 INJECTION, SOLUTION INTRAMUSCULAR; INTRAVENOUS
Status: CANCELLED | OUTPATIENT
Start: 2024-02-12

## 2024-02-09 RX ORDER — LIDOCAINE 40 MG/G
CREAM TOPICAL
Status: CANCELLED | OUTPATIENT
Start: 2024-02-09

## 2024-02-09 NOTE — PROCEDURE: EXCISION
Surgeon (Optional): Maryan Colindres PA-C
Accession #: [id number]
Size Of Lesion In Cm: 0.5
Anesthesia Volume In Cc: 0
Was An Eye Clamp Used?: No
Eye Clamp Note Details: An eye clamp was used during the procedure.
Excision Method: Elliptical
Saucerization Depth: dermis and superficial adipose tissue
Repair Type: Intermediate
Intermediate / Complex Repair - Final Wound Length In Cm: 3.2
Suturegard Retention Suture: 2-0 Nylon
Retention Suture Bite Size: 3 mm
Length To Time In Minutes Device Was In Place: 10
Number Of Hemigard Strips Per Side: 1
Undermining Type: Entire Wound
Debridement Text: The wound edges were debrided prior to proceeding with the closure to facilitate wound healing.
Helical Rim Text: The closure involved the helical rim.
Vermilion Border Text: The closure involved the vermilion border.
Nostril Rim Text: The closure involved the nostril rim.
Retention Suture Text: Retention sutures were placed to support the closure and prevent dehiscence.
Suture Removal: 10 days
Lab: [id number]
Graft Donor Site Bandage (Optional-Leave Blank If You Don't Want In Note): Steri-strips and a pressure bandage were applied to the donor site.
Epidermal Closure Graft Donor Site (Optional): simple interrupted
Billing Type: Third-Party Bill
Excision Depth: adipose tissue
Scalpel Size: 15 blade
Anesthesia Type: 1% lidocaine with epinephrine
Additional Anesthesia Volume In Cc: 6
Hemostasis: Electrocautery
Estimated Blood Loss (Cc): minimal
Detail Level: Detailed
Deep Sutures: 4-0 PGCL
Epidermal Sutures: 4-0 Prolene
Number Of Epidermal Sutures (Optional): 4
Epidermal Closure: horizontal mattress
Wound Care: Petrolatum
Dressing: pressure dressing with telfa
Suturegard Intro: Intraoperative tissue expansion was performed, utilizing the SUTUREGARD device, in order to reduce wound tension.
Suturegard Body: The suture ends were repeatedly re-tightened and re-clamped to achieve the desired tissue expansion.
Hemigard Intro: Due to skin fragility and wound tension, it was decided to use HEMIGARD adhesive retention suture devices to permit a linear closure. The skin was cleaned and dried for a 6cm distance away from the wound. Excessive hair, if present, was removed to allow for adhesion.
Hemigard Postcare Instructions: The HEMIGARD strips are to remain completely dry for at least 5-7 days.
Positioning (Leave Blank If You Do Not Want): The patient was placed in a comfortable position exposing the surgical site.
Pre-Excision Curettage Text (Leave Blank If You Do Not Want): Prior to drawing the surgical margin the visible lesion was removed with curettage to clearly define the lesion size.
Complex Repair Preamble Text (Leave Blank If You Do Not Want): Extensive wide undermining was performed.
Intermediate Repair Preamble Text (Leave Blank If You Do Not Want): Undermining was performed with blunt dissection.
Curvilinear Excision Additional Text (Leave Blank If You Do Not Want): The margin was drawn around the clinically apparent lesion.  A curvilinear shape was then drawn on the skin incorporating the lesion and margins.  Incisions were then made along these lines to the appropriate tissue plane and the lesion was extirpated.
Fusiform Excision Additional Text (Leave Blank If You Do Not Want): The margin was drawn around the clinically apparent lesion.  A fusiform shape was then drawn on the skin incorporating the lesion and margins.  Incisions were then made along these lines to the appropriate tissue plane and the lesion was extirpated.
Elliptical Excision Additional Text (Leave Blank If You Do Not Want): The margin was drawn around the clinically apparent lesion.  An elliptical shape was then drawn on the skin incorporating the lesion and margins.  Incisions were then made along these lines to the appropriate tissue plane and the lesion was extirpated.
Saucerization Excision Additional Text (Leave Blank If You Do Not Want): The margin was drawn around the clinically apparent lesion.  Incisions were then made along these lines, in a tangential fashion, to the appropriate tissue plane and the lesion was extirpated.
Slit Excision Additional Text (Leave Blank If You Do Not Want): A linear line was drawn on the skin overlying the lesion. An incision was made slowly until the lesion was visualized.  Once visualized, the lesion was removed with blunt dissection.
Excisional Biopsy Additional Text (Leave Blank If You Do Not Want): The margin was drawn around the clinically apparent lesion. An elliptical shape was then drawn on the skin incorporating the lesion and margins.  Incisions were then made along these lines to the appropriate tissue plane and the lesion was extirpated.
Perilesional Excision Additional Text (Leave Blank If You Do Not Want): The margin was drawn around the clinically apparent lesion. Incisions were then made along these lines to the appropriate tissue plane and the lesion was extirpated.
Repair Performed By Another Provider Text (Leave Blank If You Do Not Want): After the tissue was excised the defect was repaired by another provider.
No Repair - Repaired With Adjacent Surgical Defect Text (Leave Blank If You Do Not Want): After the excision the defect was repaired concurrently with another surgical defect which was in close approximation.
Adjacent Tissue Transfer Text: The defect edges were debeveled with a #15 scalpel blade. Given the location of the defect and the proximity to free margins an adjacent tissue transfer was deemed most appropriate. Using a sterile surgical marker, an appropriate flap was drawn incorporating the defect and placing the expected incisions within the relaxed skin tension lines where possible. The area thus outlined was incised deep to adipose tissue with a #15 scalpel blade. The skin margins were undermined to an appropriate distance in all directions utilizing iris scissors and carried over to close the primary defect.
Advancement Flap (Single) Text: The defect edges were debeveled with a #15 scalpel blade. Given the location of the defect and the proximity to free margins a single advancement flap was deemed most appropriate. Using a sterile surgical marker, an appropriate advancement flap was drawn incorporating the defect and placing the expected incisions within the relaxed skin tension lines where possible. The area thus outlined was incised deep to adipose tissue with a #15 scalpel blade. The skin margins were undermined to an appropriate distance in all directions utilizing iris scissors. Following this, the designed flap was advanced and carried over into the primary defect and sutured into place.
Advancement Flap (Double) Text: The defect edges were debeveled with a #15 scalpel blade. Given the location of the defect and the proximity to free margins a double advancement flap was deemed most appropriate. Using a sterile surgical marker, the appropriate advancement flaps were drawn incorporating the defect and placing the expected incisions within the relaxed skin tension lines where possible. The area thus outlined was incised deep to adipose tissue with a #15 scalpel blade. The skin margins were undermined to an appropriate distance in all directions utilizing iris scissors. Following this, the designed flaps were advanced and carried over into the primary defect and sutured into place.
Burow's Advancement Flap Text: The defect edges were debeveled with a #15 scalpel blade. Given the location of the defect and the proximity to free margins a Burow's advancement flap was deemed most appropriate. Using a sterile surgical marker, the appropriate advancement flap was drawn incorporating the defect and placing the expected incisions within the relaxed skin tension lines where possible. The area thus outlined was incised deep to adipose tissue with a #15 scalpel blade. The skin margins were undermined to an appropriate distance in all directions utilizing iris scissors. Following this, the designed flap was advanced and carried over into the primary defect and sutured into place.
Chonodrocutaneous Helical Advancement Flap Text: The defect edges were debeveled with a #15 scalpel blade. Given the location of the defect and the proximity to free margins a chondrocutaneous helical advancement flap was deemed most appropriate. Using a sterile surgical marker, the appropriate advancement flap was drawn incorporating the defect and placing the expected incisions within the relaxed skin tension lines where possible. The area thus outlined was incised deep to adipose tissue with a #15 scalpel blade. The skin margins were undermined to an appropriate distance in all directions utilizing iris scissors. Following this, the designed flap was advanced and carried over into the primary defect and sutured into place.
Crescentic Advancement Flap Text: The defect edges were debeveled with a #15 scalpel blade. Given the location of the defect and the proximity to free margins a crescentic advancement flap was deemed most appropriate. Using a sterile surgical marker, the appropriate advancement flap was drawn incorporating the defect and placing the expected incisions within the relaxed skin tension lines where possible. The area thus outlined was incised deep to adipose tissue with a #15 scalpel blade. The skin margins were undermined to an appropriate distance in all directions utilizing iris scissors. Following this, the designed flap was advanced and carried over into the primary defect and sutured into place.
A-T Advancement Flap Text: The defect edges were debeveled with a #15 scalpel blade. Given the location of the defect, shape of the defect and the proximity to free margins an A-T advancement flap was deemed most appropriate. Using a sterile surgical marker, an appropriate advancement flap was drawn incorporating the defect and placing the expected incisions within the relaxed skin tension lines where possible. The area thus outlined was incised deep to adipose tissue with a #15 scalpel blade. The skin margins were undermined to an appropriate distance in all directions utilizing iris scissors. Following this, the designed flap was advanced and carried over into the primary defect and sutured into place.
O-T Advancement Flap Text: The defect edges were debeveled with a #15 scalpel blade. Given the location of the defect, shape of the defect and the proximity to free margins an O-T advancement flap was deemed most appropriate. Using a sterile surgical marker, an appropriate advancement flap was drawn incorporating the defect and placing the expected incisions within the relaxed skin tension lines where possible. The area thus outlined was incised deep to adipose tissue with a #15 scalpel blade. The skin margins were undermined to an appropriate distance in all directions utilizing iris scissors. Following this, the designed flap was advanced and carried over into the primary defect and sutured into place.
O-L Flap Text: The defect edges were debeveled with a #15 scalpel blade. Given the location of the defect, shape of the defect and the proximity to free margins an O-L flap was deemed most appropriate. Using a sterile surgical marker, an appropriate advancement flap was drawn incorporating the defect and placing the expected incisions within the relaxed skin tension lines where possible. The area thus outlined was incised deep to adipose tissue with a #15 scalpel blade. The skin margins were undermined to an appropriate distance in all directions utilizing iris scissors. Following this, the designed flap was advanced and carried over into the primary defect and sutured into place.
O-Z Flap Text: The defect edges were debeveled with a #15 scalpel blade. Given the location of the defect, shape of the defect and the proximity to free margins an O-Z flap was deemed most appropriate. Using a sterile surgical marker, an appropriate transposition flap was drawn incorporating the defect and placing the expected incisions within the relaxed skin tension lines where possible. The area thus outlined was incised deep to adipose tissue with a #15 scalpel blade. The skin margins were undermined to an appropriate distance in all directions utilizing iris scissors. Following this, the designed flap was carried over into the primary defect and sutured into place.
Double O-Z Flap Text: The defect edges were debeveled with a #15 scalpel blade. Given the location of the defect, shape of the defect and the proximity to free margins a Double O-Z flap was deemed most appropriate. Using a sterile surgical marker, an appropriate transposition flap was drawn incorporating the defect and placing the expected incisions within the relaxed skin tension lines where possible. The area thus outlined was incised deep to adipose tissue with a #15 scalpel blade. The skin margins were undermined to an appropriate distance in all directions utilizing iris scissors. Following this, the designed flap was carried over into the primary defect and sutured into place.
V-Y Flap Text: The defect edges were debeveled with a #15 scalpel blade. Given the location of the defect, shape of the defect and the proximity to free margins a V-Y flap was deemed most appropriate. Using a sterile surgical marker, an appropriate advancement flap was drawn incorporating the defect and placing the expected incisions within the relaxed skin tension lines where possible. The area thus outlined was incised deep to adipose tissue with a #15 scalpel blade. The skin margins were undermined to an appropriate distance in all directions utilizing iris scissors. Following this, the designed flap was advanced and carried over into the primary defect and sutured into place.
Advancement-Rotation Flap Text: The defect edges were debeveled with a #15 scalpel blade. Given the location of the defect, shape of the defect and the proximity to free margins an advancement-rotation flap was deemed most appropriate. Using a sterile surgical marker, an appropriate flap was drawn incorporating the defect and placing the expected incisions within the relaxed skin tension lines where possible. The area thus outlined was incised deep to adipose tissue with a #15 scalpel blade. The skin margins were undermined to an appropriate distance in all directions utilizing iris scissors. Following this, the designed flap was carried over into the primary defect and sutured into place.
Mercedes Flap Text: The defect edges were debeveled with a #15 scalpel blade. Given the location of the defect, shape of the defect and the proximity to free margins a Mercedes flap was deemed most appropriate. Using a sterile surgical marker, an appropriate advancement flap was drawn incorporating the defect and placing the expected incisions within the relaxed skin tension lines where possible. The area thus outlined was incised deep to adipose tissue with a #15 scalpel blade. The skin margins were undermined to an appropriate distance in all directions utilizing iris scissors. Following this, the designed flap was advanced and carried over into the primary defect and sutured into place.
Modified Advancement Flap Text: The defect edges were debeveled with a #15 scalpel blade. Given the location of the defect, shape of the defect and the proximity to free margins a modified advancement flap was deemed most appropriate. Using a sterile surgical marker, an appropriate advancement flap was drawn incorporating the defect and placing the expected incisions within the relaxed skin tension lines where possible. The area thus outlined was incised deep to adipose tissue with a #15 scalpel blade. The skin margins were undermined to an appropriate distance in all directions utilizing iris scissors. Following this, the designed flap was advanced and carried over into the primary defect and sutured into place.
Mucosal Advancement Flap Text: Given the location of the defect, shape of the defect and the proximity to free margins a mucosal advancement flap was deemed most appropriate. Incisions were made with a 15 blade scalpel in the appropriate fashion along the cutaneous vermilion border and the mucosal lip. The remaining actinically damaged mucosal tissue was excised.  The mucosal advancement flap was then elevated to the gingival sulcus with care taken to preserve the neurovascular structures and advanced into the primary defect. Care was taken to ensure that precise realignment of the vermilion border was achieved.
Peng Advancement Flap Text: The defect edges were debeveled with a #15 scalpel blade. Given the location of the defect, shape of the defect and the proximity to free margins a Peng advancement flap was deemed most appropriate. Using a sterile surgical marker, an appropriate advancement flap was drawn incorporating the defect and placing the expected incisions within the relaxed skin tension lines where possible. The area thus outlined was incised deep to adipose tissue with a #15 scalpel blade. The skin margins were undermined to an appropriate distance in all directions utilizing iris scissors. Following this, the designed flap was advanced and carried over into the primary defect and sutured into place.
Hatchet Flap Text: The defect edges were debeveled with a #15 scalpel blade. Given the location of the defect, shape of the defect and the proximity to free margins a hatchet flap was deemed most appropriate. Using a sterile surgical marker, an appropriate hatchet flap was drawn incorporating the defect and placing the expected incisions within the relaxed skin tension lines where possible. The area thus outlined was incised deep to adipose tissue with a #15 scalpel blade. The skin margins were undermined to an appropriate distance in all directions utilizing iris scissors. Following this, the designed flap was carried over into the primary defect and sutured into place.
Rotation Flap Text: The defect edges were debeveled with a #15 scalpel blade. Given the location of the defect, shape of the defect and the proximity to free margins a rotation flap was deemed most appropriate. Using a sterile surgical marker, an appropriate rotation flap was drawn incorporating the defect and placing the expected incisions within the relaxed skin tension lines where possible. The area thus outlined was incised deep to adipose tissue with a #15 scalpel blade. The skin margins were undermined to an appropriate distance in all directions utilizing iris scissors. Following this, the designed flap was carried over into the primary defect and sutured into place.
Bilateral Rotation Flap Text: The defect edges were debeveled with a #15 scalpel blade. Given the location of the defect, shape of the defect and the proximity to free margins a bilateral rotation flap was deemed most appropriate. Using a sterile surgical marker, an appropriate rotation flap was drawn incorporating the defect and placing the expected incisions within the relaxed skin tension lines where possible. The area thus outlined was incised deep to adipose tissue with a #15 scalpel blade. The skin margins were undermined to an appropriate distance in all directions utilizing iris scissors. Following this, the designed flap was carried over into the primary defect and sutured into place.
Spiral Flap Text: The defect edges were debeveled with a #15 scalpel blade. Given the location of the defect, shape of the defect and the proximity to free margins a spiral flap was deemed most appropriate. Using a sterile surgical marker, an appropriate rotation flap was drawn incorporating the defect and placing the expected incisions within the relaxed skin tension lines where possible. The area thus outlined was incised deep to adipose tissue with a #15 scalpel blade. The skin margins were undermined to an appropriate distance in all directions utilizing iris scissors. Following this, the designed flap was carried over into the primary defect and sutured into place.
Staged Advancement Flap Text: The defect edges were debeveled with a #15 scalpel blade. Given the location of the defect, shape of the defect and the proximity to free margins a staged advancement flap was deemed most appropriate. Using a sterile surgical marker, an appropriate advancement flap was drawn incorporating the defect and placing the expected incisions within the relaxed skin tension lines where possible. The area thus outlined was incised deep to adipose tissue with a #15 scalpel blade. The skin margins were undermined to an appropriate distance in all directions utilizing iris scissors. Following this, the designed flap was carried over into the primary defect and sutured into place.
Star Wedge Flap Text: The defect edges were debeveled with a #15 scalpel blade. Given the location of the defect, shape of the defect and the proximity to free margins a star wedge flap was deemed most appropriate. Using a sterile surgical marker, an appropriate rotation flap was drawn incorporating the defect and placing the expected incisions within the relaxed skin tension lines where possible. The area thus outlined was incised deep to adipose tissue with a #15 scalpel blade. The skin margins were undermined to an appropriate distance in all directions utilizing iris scissors. Following this, the designed flap was carried over into the primary defect and sutured into place.
Transposition Flap Text: The defect edges were debeveled with a #15 scalpel blade. Given the location of the defect and the proximity to free margins a transposition flap was deemed most appropriate. Using a sterile surgical marker, an appropriate transposition flap was drawn incorporating the defect. The area thus outlined was incised deep to adipose tissue with a #15 scalpel blade. The skin margins were undermined to an appropriate distance in all directions utilizing iris scissors. Following this, the designed flap was carried over into the primary defect and sutured into place.
Muscle Hinge Flap Text: The defect edges were debeveled with a #15 scalpel blade.  Given the size, depth and location of the defect and the proximity to free margins a muscle hinge flap was deemed most appropriate. Using a sterile surgical marker, an appropriate hinge flap was drawn incorporating the defect. The area thus outlined was incised with a #15 scalpel blade. The skin margins were undermined to an appropriate distance in all directions utilizing iris scissors. Following this, the designed flap was carried into the primary defect and sutured into place.
Mustarde Flap Text: The defect edges were debeveled with a #15 scalpel blade.  Given the size, depth and location of the defect and the proximity to free margins a Mustarde flap was deemed most appropriate. Using a sterile surgical marker, an appropriate flap was drawn incorporating the defect. The area thus outlined was incised with a #15 scalpel blade. The skin margins were undermined to an appropriate distance in all directions utilizing iris scissors. Following this, the designed flap was carried into the primary defect and sutured into place.
Nasal Turnover Hinge Flap Text: The defect edges were debeveled with a #15 scalpel blade.  Given the size, depth, location of the defect and the defect being full thickness a nasal turnover hinge flap was deemed most appropriate. Using a sterile surgical marker, an appropriate hinge flap was drawn incorporating the defect. The area thus outlined was incised with a #15 scalpel blade. The flap was designed to recreate the nasal mucosal lining and the alar rim. The skin margins were undermined to an appropriate distance in all directions utilizing iris scissors. Following this, the designed flap was carried over into the primary defect and sutured into place
Nasalis-Muscle-Based Myocutaneous Island Pedicle Flap Text: Using a #15 blade, an incision was made around the donor flap to the level of the nasalis muscle. Wide lateral undermining was then performed in both the subcutaneous plane above the nasalis muscle, and in a submuscular plane just above periosteum. This allowed the formation of a free nasalis muscle axial pedicle (based on the angular artery) which was still attached to the actual cutaneous flap, increasing its mobility and vascular viability. Hemostasis was obtained with pinpoint electrocoagulation. The flap was mobilized into position and the pivotal anchor points positioned and stabilized with buried interrupted sutures. Subcutaneous and dermal tissues were closed in a multilayered fashion with sutures. Tissue redundancies were excised, and the epidermal edges were apposed without significant tension and sutured with sutures.
Orbicularis Oris Muscle Flap Text: The defect edges were debeveled with a #15 scalpel blade.  Given that the defect affected the competency of the oral sphincter an orbicularis oris muscle flap was deemed most appropriate to restore this competency and normal muscle function.  Using a sterile surgical marker, an appropriate flap was drawn incorporating the defect. The area thus outlined was incised with a #15 scalpel blade. Following this, the designed flap was carried over into the primary defect and sutured into place.
Melolabial Transposition Flap Text: The defect edges were debeveled with a #15 scalpel blade. Given the location of the defect and the proximity to free margins a melolabial flap was deemed most appropriate. Using a sterile surgical marker, an appropriate melolabial transposition flap was drawn incorporating the defect. The area thus outlined was incised deep to adipose tissue with a #15 scalpel blade. The skin margins were undermined to an appropriate distance in all directions utilizing iris scissors. Following this, the designed flap was carried over into the primary defect and sutured into place.
Rhombic Flap Text: The defect edges were debeveled with a #15 scalpel blade. Given the location of the defect and the proximity to free margins a rhombic flap was deemed most appropriate. Using a sterile surgical marker, an appropriate rhombic flap was drawn incorporating the defect. The area thus outlined was incised deep to adipose tissue with a #15 scalpel blade. The skin margins were undermined to an appropriate distance in all directions utilizing iris scissors. Following this, the designed flap was carried over into the primary defect and sutured into place.
Rhomboid Transposition Flap Text: The defect edges were debeveled with a #15 scalpel blade. Given the location of the defect and the proximity to free margins a rhomboid transposition flap was deemed most appropriate. Using a sterile surgical marker, an appropriate rhomboid flap was drawn incorporating the defect. The area thus outlined was incised deep to adipose tissue with a #15 scalpel blade. The skin margins were undermined to an appropriate distance in all directions utilizing iris scissors. Following this, the designed flap was carried over into the primary defect and sutured into place.
Bi-Rhombic Flap Text: The defect edges were debeveled with a #15 scalpel blade. Given the location of the defect and the proximity to free margins a bi-rhombic flap was deemed most appropriate. Using a sterile surgical marker, an appropriate rhombic flap was drawn incorporating the defect. The area thus outlined was incised deep to adipose tissue with a #15 scalpel blade. The skin margins were undermined to an appropriate distance in all directions utilizing iris scissors. Following this, the designed flap was carried over into the primary defect and sutured into place.
Helical Rim Advancement Flap Text: The defect edges were debeveled with a #15 blade scalpel.  Given the location of the defect and the proximity to free margins (helical rim) a double helical rim advancement flap was deemed most appropriate. Using a sterile surgical marker, the appropriate advancement flaps were drawn incorporating the defect and placing the expected incisions between the helical rim and antihelix where possible.  The area thus outlined was incised through and through with a #15 scalpel blade.  With a skin hook and iris scissors, the flaps were gently and sharply undermined and freed up. Folllowing this, the designed flaps were carried over into the primary defect and sutured into place.
Bilateral Helical Rim Advancement Flap Text: The defect edges were debeveled with a #15 blade scalpel.  Given the location of the defect and the proximity to free margins (helical rim) a bilateral helical rim advancement flap was deemed most appropriate. Using a sterile surgical marker, the appropriate advancement flaps were drawn incorporating the defect and placing the expected incisions between the helical rim and antihelix where possible.  The area thus outlined was incised through and through with a #15 scalpel blade.  With a skin hook and iris scissors, the flaps were gently and sharply undermined and freed up. Following this, the designed flaps were placed into the primary defect and sutured into place.
Ear Star Wedge Flap Text: The defect edges were debeveled with a #15 blade scalpel.  Given the location of the defect and the proximity to free margins (helical rim) an ear star wedge flap was deemed most appropriate. Using a sterile surgical marker, the appropriate flap was drawn incorporating the defect and placing the expected incisions between the helical rim and antihelix where possible.  The area thus outlined was incised through and through with a #15 scalpel blade. Following this, the designed flap was carried over into the primary defect and sutured into place.
Banner Transposition Flap Text: The defect edges were debeveled with a #15 scalpel blade. Given the location of the defect and the proximity to free margins a Banner transposition flap was deemed most appropriate. Using a sterile surgical marker, an appropriate flap was drawn around the defect. The area thus outlined was incised deep to adipose tissue with a #15 scalpel blade. The skin margins were undermined to an appropriate distance in all directions utilizing iris scissors. Following this, the designed flap was carried into the primary defect and sutured into place.
Bilobed Flap Text: The defect edges were debeveled with a #15 scalpel blade. Given the location of the defect and the proximity to free margins a bilobe flap was deemed most appropriate. Using a sterile surgical marker, an appropriate bilobe flap drawn around the defect. The area thus outlined was incised deep to adipose tissue with a #15 scalpel blade. The skin margins were undermined to an appropriate distance in all directions utilizing iris scissors. Following this, the designed flap was carried over into the primary defect and sutured into place.
Bilobed Transposition Flap Text: The defect edges were debeveled with a #15 scalpel blade. Given the location of the defect and the proximity to free margins a bilobed transposition flap was deemed most appropriate. Using a sterile surgical marker, an appropriate bilobe flap drawn around the defect. The area thus outlined was incised deep to adipose tissue with a #15 scalpel blade. The skin margins were undermined to an appropriate distance in all directions utilizing iris scissors. Following this, the designed flap was carried over into the primary defect and sutured into place.
Trilobed Flap Text: The defect edges were debeveled with a #15 scalpel blade. Given the location of the defect and the proximity to free margins a trilobed flap was deemed most appropriate. Using a sterile surgical marker, an appropriate trilobed flap was drawn around the defect. The area thus outlined was incised deep to adipose tissue with a #15 scalpel blade. The skin margins were undermined to an appropriate distance in all directions utilizing iris scissors. Following this, the designed flap was carried into the primary defect and sutured into place.
Dorsal Nasal Flap Text: The defect edges were debeveled with a #15 scalpel blade. Given the location of the defect and the proximity to free margins a dorsal nasal flap was deemed most appropriate. Using a sterile surgical marker, an appropriate dorsal nasal flap was drawn around the defect. The area thus outlined was incised deep to adipose tissue with a #15 scalpel blade. The skin margins were undermined to an appropriate distance in all directions utilizing iris scissors. Following this, the designed flap was carried into the primary defect and sutured into place.
Island Pedicle Flap Text: The defect edges were debeveled with a #15 scalpel blade. Given the location of the defect, shape of the defect and the proximity to free margins an island pedicle advancement flap was deemed most appropriate. Using a sterile surgical marker, an appropriate advancement flap was drawn incorporating the defect, outlining the appropriate donor tissue and placing the expected incisions within the relaxed skin tension lines where possible. The area thus outlined was incised deep to adipose tissue with a #15 scalpel blade. The skin margins were undermined to an appropriate distance in all directions around the primary defect and laterally outward around the island pedicle utilizing iris scissors.  There was minimal undermining beneath the pedicle flap. Following this, the flap was carried over into the primary defect and sutured into place.
Island Pedicle Flap With Canthal Suspension Text: The defect edges were debeveled with a #15 scalpel blade. Given the location of the defect, shape of the defect and the proximity to free margins an island pedicle advancement flap was deemed most appropriate. Using a sterile surgical marker, an appropriate advancement flap was drawn incorporating the defect, outlining the appropriate donor tissue and placing the expected incisions within the relaxed skin tension lines where possible. The area thus outlined was incised deep to adipose tissue with a #15 scalpel blade. The skin margins were undermined to an appropriate distance in all directions around the primary defect and laterally outward around the island pedicle utilizing iris scissors.  There was minimal undermining beneath the pedicle flap. A suspension suture was placed in the canthal tendon to prevent tension and prevent ectropion. Following this, the designed flap was placed into the primary defect and sutured into place.
Alar Island Pedicle Flap Text: The defect edges were debeveled with a #15 scalpel blade. Given the location of the defect, shape of the defect and the proximity to the alar rim an island pedicle advancement flap was deemed most appropriate. Using a sterile surgical marker, an appropriate advancement flap was drawn incorporating the defect, outlining the appropriate donor tissue and placing the expected incisions within the nasal ala running parallel to the alar rim. The area thus outlined was incised with a #15 scalpel blade. The skin margins were undermined minimally to an appropriate distance in all directions around the primary defect and laterally outward around the island pedicle utilizing iris scissors.  There was minimal undermining beneath the pedicle flap. Following this, the designed flap was carried over into the primary defect and sutured into place.
Double Island Pedicle Flap Text: The defect edges were debeveled with a #15 scalpel blade. Given the location of the defect, shape of the defect and the proximity to free margins a double island pedicle advancement flap was deemed most appropriate. Using a sterile surgical marker, an appropriate advancement flap was drawn incorporating the defect, outlining the appropriate donor tissue and placing the expected incisions within the relaxed skin tension lines where possible. The area thus outlined was incised deep to adipose tissue with a #15 scalpel blade. The skin margins were undermined to an appropriate distance in all directions around the primary defect and laterally outward around the island pedicle utilizing iris scissors.  There was minimal undermining beneath the pedicle flap. Following this, the flap was carried over into the primary defect and sutured into place.
Island Pedicle Flap-Requiring Vessel Identification Text: The defect edges were debeveled with a #15 scalpel blade. Given the location of the defect, shape of the defect and the proximity to free margins an island pedicle advancement flap was deemed most appropriate. Using a sterile surgical marker, an appropriate advancement flap was drawn, based on the axial vessel mentioned above, incorporating the defect, outlining the appropriate donor tissue and placing the expected incisions within the relaxed skin tension lines where possible. The area thus outlined was incised deep to adipose tissue with a #15 scalpel blade. The skin margins were undermined to an appropriate distance in all directions around the primary defect and laterally outward around the island pedicle utilizing iris scissors.  There was minimal undermining beneath the pedicle flap. Following this, the designed flap was carried over into the primary defect and sutured into place.
Keystone Flap Text: The defect edges were debeveled with a #15 scalpel blade. Given the location of the defect, shape of the defect a keystone flap was deemed most appropriate. Using a sterile surgical marker, an appropriate keystone flap was drawn incorporating the defect, outlining the appropriate donor tissue and placing the expected incisions within the relaxed skin tension lines where possible. The area thus outlined was incised deep to adipose tissue with a #15 scalpel blade. The skin margins were undermined to an appropriate distance in all directions around the primary defect and laterally outward around the flap utilizing iris scissors. Following this, the designed flap was carried into the primary defect and sutured into place.
O-T Plasty Text: The defect edges were debeveled with a #15 scalpel blade. Given the location of the defect, shape of the defect and the proximity to free margins an O-T plasty was deemed most appropriate. Using a sterile surgical marker, an appropriate O-T plasty was drawn incorporating the defect and placing the expected incisions within the relaxed skin tension lines where possible. The area thus outlined was incised deep to adipose tissue with a #15 scalpel blade. The skin margins were undermined to an appropriate distance in all directions utilizing iris scissors. Following this, the designed flap was carried over into the primary defect and sutured into place.
O-Z Plasty Text: The defect edges were debeveled with a #15 scalpel blade. Given the location of the defect, shape of the defect and the proximity to free margins an O-Z plasty (double transposition flap) was deemed most appropriate. Using a sterile surgical marker, the appropriate transposition flaps were drawn incorporating the defect and placing the expected incisions within the relaxed skin tension lines where possible. The area thus outlined was incised deep to adipose tissue with a #15 scalpel blade. The skin margins were undermined to an appropriate distance in all directions utilizing iris scissors. Hemostasis was achieved with electrocautery. The flaps were then transposed and carried over into place, one clockwise and the other counterclockwise, and anchored with interrupted buried subcutaneous sutures.
Double O-Z Plasty Text: The defect edges were debeveled with a #15 scalpel blade. Given the location of the defect, shape of the defect and the proximity to free margins a Double O-Z plasty (double transposition flap) was deemed most appropriate. Using a sterile surgical marker, the appropriate transposition flaps were drawn incorporating the defect and placing the expected incisions within the relaxed skin tension lines where possible. The area thus outlined was incised deep to adipose tissue with a #15 scalpel blade. The skin margins were undermined to an appropriate distance in all directions utilizing iris scissors. Hemostasis was achieved with electrocautery. The flaps were then transposed and carried over into place, one clockwise and the other counterclockwise, and anchored with interrupted buried subcutaneous sutures.
V-Y Plasty Text: The defect edges were debeveled with a #15 scalpel blade. Given the location of the defect, shape of the defect and the proximity to free margins an V-Y advancement flap was deemed most appropriate. Using a sterile surgical marker, an appropriate advancement flap was drawn incorporating the defect and placing the expected incisions within the relaxed skin tension lines where possible. The area thus outlined was incised deep to adipose tissue with a #15 scalpel blade. The skin margins were undermined to an appropriate distance in all directions utilizing iris scissors. Following this, the designed flap was advanced and carried over into the primary defect and sutured into place.
H Plasty Text: Given the location of the defect, shape of the defect and the proximity to free margins a H-plasty was deemed most appropriate for repair. Using a sterile surgical marker, the appropriate advancement arms of the H-plasty were drawn incorporating the defect and placing the expected incisions within the relaxed skin tension lines where possible. The area thus outlined was incised deep to adipose tissue with a #15 scalpel blade. The skin margins were undermined to an appropriate distance in all directions utilizing iris scissors.  The opposing advancement arms were then advanced and carried over into place in opposite direction and anchored with interrupted buried subcutaneous sutures.
W Plasty Text: The lesion was extirpated to the level of the fat with a #15 scalpel blade. Given the location of the defect, shape of the defect and the proximity to free margins a W-plasty was deemed most appropriate for repair. Using a sterile surgical marker, the appropriate transposition arms of the W-plasty were drawn incorporating the defect and placing the expected incisions within the relaxed skin tension lines where possible. The area thus outlined was incised deep to adipose tissue with a #15 scalpel blade. The skin margins were undermined to an appropriate distance in all directions utilizing iris scissors. The opposing transposition arms were then transposed and carried over into place in opposite direction and anchored with interrupted buried subcutaneous sutures.
Z Plasty Text: The lesion was extirpated to the level of the fat with a #15 scalpel blade. Given the location of the defect, shape of the defect and the proximity to free margins a Z-plasty was deemed most appropriate for repair. Using a sterile surgical marker, the appropriate transposition arms of the Z-plasty were drawn incorporating the defect and placing the expected incisions within the relaxed skin tension lines where possible. The area thus outlined was incised deep to adipose tissue with a #15 scalpel blade. The skin margins were undermined to an appropriate distance in all directions utilizing iris scissors. The opposing transposition arms were then transposed and carried over into place in opposite direction and anchored with interrupted buried subcutaneous sutures.
Double Z Plasty Text: The lesion was extirpated to the level of the fat with a #15 scalpel blade. Given the location of the defect, shape of the defect and the proximity to free margins a double Z-plasty was deemed most appropriate for repair. Using a sterile surgical marker, the appropriate transposition arms of the double Z-plasty were drawn incorporating the defect and placing the expected incisions within the relaxed skin tension lines where possible. The area thus outlined was incised deep to adipose tissue with a #15 scalpel blade. The skin margins were undermined to an appropriate distance in all directions utilizing iris scissors. The opposing transposition arms were then transposed and carried over into place in opposite direction and anchored with interrupted buried subcutaneous sutures.
Zygomaticofacial Flap Text: Given the location of the defect, shape of the defect and the proximity to free margins a zygomaticofacial flap was deemed most appropriate for repair. Using a sterile surgical marker, the appropriate flap was drawn incorporating the defect and placing the expected incisions within the relaxed skin tension lines where possible. The area thus outlined was incised deep to adipose tissue with a #15 scalpel blade with preservation of a vascular pedicle.  The skin margins were undermined to an appropriate distance in all directions utilizing iris scissors. The flap was then carried over into the defect and anchored with interrupted buried subcutaneous sutures.
Cheek Interpolation Flap Text: A decision was made to reconstruct the defect utilizing an interpolation axial flap and a staged reconstruction.  A telfa template was made of the defect.  This telfa template was then used to outline the Cheek Interpolation flap.  The donor area for the pedicle flap was then injected with anesthesia.  The flap was excised through the skin and subcutaneous tissue down to the layer of the underlying musculature.  The interpolation flap was carefully excised within this deep plane to maintain its blood supply.  The edges of the donor site were undermined.   The donor site was closed in a primary fashion.  The pedicle was then rotated into position and sutured.  Once the tube was sutured into place, adequate blood supply was confirmed with blanching and refill.  The pedicle was then wrapped with xeroform gauze and dressed appropriately with a telfa and gauze bandage to ensure continued blood supply and protect the attached pedicle.
Cheek-To-Nose Interpolation Flap Text: A decision was made to reconstruct the defect utilizing an interpolation axial flap and a staged reconstruction.  A telfa template was made of the defect.  This telfa template was then used to outline the Cheek-To-Nose Interpolation flap.  The donor area for the pedicle flap was then injected with anesthesia.  The flap was excised through the skin and subcutaneous tissue down to the layer of the underlying musculature.  The interpolation flap was carefully excised within this deep plane to maintain its blood supply.  The edges of the donor site were undermined.   The donor site was closed in a primary fashion.  The pedicle was then rotated into position and sutured.  Once the tube was sutured into place, adequate blood supply was confirmed with blanching and refill.  The pedicle was then wrapped with xeroform gauze and dressed appropriately with a telfa and gauze bandage to ensure continued blood supply and protect the attached pedicle.
Interpolation Flap Text: A decision was made to reconstruct the defect utilizing an interpolation axial flap and a staged reconstruction.  A telfa template was made of the defect.  This telfa template was then used to outline the interpolation flap.  The donor area for the pedicle flap was then injected with anesthesia.  The flap was excised through the skin and subcutaneous tissue down to the layer of the underlying musculature.  The interpolation flap was carefully excised within this deep plane to maintain its blood supply.  The edges of the donor site were undermined.   The donor site was closed in a primary fashion.  The pedicle was then rotated into position and sutured.  Once the tube was sutured into place, adequate blood supply was confirmed with blanching and refill.  The pedicle was then wrapped with xeroform gauze and dressed appropriately with a telfa and gauze bandage to ensure continued blood supply and protect the attached pedicle.
Melolabial Interpolation Flap Text: A decision was made to reconstruct the defect utilizing an interpolation axial flap and a staged reconstruction.  A telfa template was made of the defect.  This telfa template was then used to outline the melolabial interpolation flap.  The donor area for the pedicle flap was then injected with anesthesia.  The flap was excised through the skin and subcutaneous tissue down to the layer of the underlying musculature.  The pedicle flap was carefully excised within this deep plane to maintain its blood supply.  The edges of the donor site were undermined.   The donor site was closed in a primary fashion.  The pedicle was then rotated into position and sutured.  Once the tube was sutured into place, adequate blood supply was confirmed with blanching and refill.  The pedicle was then wrapped with xeroform gauze and dressed appropriately with a telfa and gauze bandage to ensure continued blood supply and protect the attached pedicle.
Mastoid Interpolation Flap Text: A decision was made to reconstruct the defect utilizing an interpolation axial flap and a staged reconstruction.  A telfa template was made of the defect.  This telfa template was then used to outline the mastoid interpolation flap.  The donor area for the pedicle flap was then injected with anesthesia.  The flap was excised through the skin and subcutaneous tissue down to the layer of the underlying musculature.  The pedicle flap was carefully excised within this deep plane to maintain its blood supply.  The edges of the donor site were undermined.   The donor site was closed in a primary fashion.  The pedicle was then rotated into position and sutured.  Once the tube was sutured into place, adequate blood supply was confirmed with blanching and refill.  The pedicle was then wrapped with xeroform gauze and dressed appropriately with a telfa and gauze bandage to ensure continued blood supply and protect the attached pedicle.
Posterior Auricular Interpolation Flap Text: A decision was made to reconstruct the defect utilizing an interpolation axial flap and a staged reconstruction.  A telfa template was made of the defect.  This telfa template was then used to outline the posterior auricular interpolation flap.  The donor area for the pedicle flap was then injected with anesthesia.  The flap was excised through the skin and subcutaneous tissue down to the layer of the underlying musculature.  The pedicle flap was carefully excised within this deep plane to maintain its blood supply.  The edges of the donor site were undermined.   The donor site was closed in a primary fashion.  The pedicle was then rotated into position and sutured.  Once the tube was sutured into place, adequate blood supply was confirmed with blanching and refill.  The pedicle was then wrapped with xeroform gauze and dressed appropriately with a telfa and gauze bandage to ensure continued blood supply and protect the attached pedicle.
Paramedian Forehead Flap Text: A decision was made to reconstruct the defect utilizing an interpolation axial flap and a staged reconstruction.  A telfa template was made of the defect.  This telfa template was then used to outline the paramedian forehead pedicle flap.  The donor area for the pedicle flap was then injected with anesthesia.  The flap was excised through the skin and subcutaneous tissue down to the layer of the underlying musculature.  The pedicle flap was carefully excised within this deep plane to maintain its blood supply.  The edges of the donor site were undermined.   The donor site was closed in a primary fashion.  The pedicle was then rotated into position and sutured.  Once the tube was sutured into place, adequate blood supply was confirmed with blanching and refill.  The pedicle was then wrapped with xeroform gauze and dressed appropriately with a telfa and gauze bandage to ensure continued blood supply and protect the attached pedicle.
Abbe Flap (Upper To Lower Lip) Text: The defect of the lower lip was assessed and measured.  Given the location and size of the defect, an Abbe flap was deemed most appropriate. Using a sterile surgical marker, an appropriate Abbe flap was measured and drawn on the upper lip. Local anesthesia was then infiltrated.  A scalpel was then used to incise the upper lip through and through the skin, vermilion, muscle and mucosa, leaving the flap pedicled on the opposite side.  The flap was then rotated and transferred to the lower lip defect.  The flap was then sutured into place with a three layer technique, closing the orbicularis oris muscle layer with subcutaneous buried sutures, followed by a mucosal layer and an epidermal layer.
Abbe Flap (Lower To Upper Lip) Text: The defect of the upper lip was assessed and measured.  Given the location and size of the defect, an Abbe flap was deemed most appropriate. Using a sterile surgical marker, an appropriate Abbe flap was measured and drawn on the lower lip. Local anesthesia was then infiltrated. A scalpel was then used to incise the upper lip through and through the skin, vermilion, muscle and mucosa, leaving the flap pedicled on the opposite side.  The flap was then rotated and transferred to the lower lip defect.  The flap was then sutured into place with a three layer technique, closing the orbicularis oris muscle layer with subcutaneous buried sutures, followed by a mucosal layer and an epidermal layer.
Estlander Flap (Upper To Lower Lip) Text: The defect of the lower lip was assessed and measured.  Given the location and size of the defect, an Estlander flap was deemed most appropriate. Using a sterile surgical marker, an appropriate Estlander flap was measured and drawn on the upper lip. Local anesthesia was then infiltrated. A scalpel was then used to incise the lateral aspect of the flap, through skin, muscle and mucosa, leaving the flap pedicled medially.  The flap was then rotated and positioned to fill the lower lip defect.  The flap was then sutured into place with a three layer technique, closing the orbicularis oris muscle layer with subcutaneous buried sutures, followed by a mucosal layer and an epidermal layer.
Lip Wedge Excision Repair Text: Given the location of the defect and the proximity to free margins a full thickness wedge repair was deemed most appropriate. Using a sterile surgical marker, the appropriate repair was drawn incorporating the defect and placing the expected incisions perpendicular to the vermilion border.  The vermilion border was also meticulously outlined to ensure appropriate reapproximation during the repair.  The area thus outlined was incised through and through with a #15 scalpel blade.  The muscularis and dermis were reaproximated with deep sutures following hemostasis. Care was taken to realign the vermilion border before proceeding with the superficial closure.  Once the vermilion was realigned the superfical and mucosal closure was finished.
Ftsg Text: The defect edges were debeveled with a #15 scalpel blade. Given the location of the defect, shape of the defect and the proximity to free margins a full thickness skin graft was deemed most appropriate. Using a sterile surgical marker, the primary defect shape was transferred to the donor site. The area thus outlined was incised deep to adipose tissue with a #15 scalpel blade.  The harvested graft was then trimmed of adipose tissue until only dermis and epidermis was left.  The skin margins of the secondary defect were undermined to an appropriate distance in all directions utilizing iris scissors.  The secondary defect was closed with interrupted buried subcutaneous sutures.  The skin edges were then re-apposed with running  sutures.  The skin graft was then placed in the primary defect and oriented appropriately.
Split-Thickness Skin Graft Text: The defect edges were debeveled with a #15 scalpel blade. Given the location of the defect, shape of the defect and the proximity to free margins a split thickness skin graft was deemed most appropriate. Using a sterile surgical marker, the primary defect shape was transferred to the donor site. The split thickness graft was then harvested.  The skin graft was then placed in the primary defect and oriented appropriately.
Pinch Graft Text: The defect edges were debeveled with a #15 scalpel blade. Given the location of the defect, shape of the defect and the proximity to free margins a pinch graft was deemed most appropriate. Using a sterile surgical marker, the primary defect shape was transferred to the donor site. The area thus outlined was incised deep to adipose tissue with a #15 scalpel blade.  The harvested graft was then trimmed of adipose tissue until only dermis and epidermis was left. The skin margins of the secondary defect were undermined to an appropriate distance in all directions utilizing iris scissors.  The secondary defect was closed with interrupted buried subcutaneous sutures.  The skin edges were then re-apposed with running  sutures.  The skin graft was then placed in the primary defect and oriented appropriately.
Burow's Graft Text: The defect edges were debeveled with a #15 scalpel blade. Given the location of the defect, shape of the defect, the proximity to free margins and the presence of a standing cone deformity a Burow's skin graft was deemed most appropriate. The standing cone was removed and this tissue was then trimmed to the shape of the primary defect. The adipose tissue was also removed until only dermis and epidermis were left.  The skin margins of the secondary defect were undermined to an appropriate distance in all directions utilizing iris scissors.  The secondary defect was closed with interrupted buried subcutaneous sutures.  The skin edges were then re-apposed with running  sutures.  The skin graft was then placed in the primary defect and oriented appropriately.
Cartilage Graft Text: The defect edges were debeveled with a #15 scalpel blade. Given the location of the defect, shape of the defect, the fact the defect involved a full thickness cartilage defect a cartilage graft was deemed most appropriate.  An appropriate donor site was identified, cleansed, and anesthetized. The cartilage graft was then harvested and transferred to the recipient site, oriented appropriately and then sutured into place.  The secondary defect was then repaired using a primary closure.
Composite Graft Text: The defect edges were debeveled with a #15 scalpel blade. Given the location of the defect, shape of the defect, the proximity to free margins and the fact the defect was full thickness a composite graft was deemed most appropriate.  The defect was outline and then transferred to the donor site.  A full thickness graft was then excised from the donor site. The graft was then placed in the primary defect, oriented appropriately and then sutured into place.  The secondary defect was then repaired using a primary closure.
Epidermal Autograft Text: The defect edges were debeveled with a #15 scalpel blade. Given the location of the defect, shape of the defect and the proximity to free margins an epidermal autograft was deemed most appropriate. Using a sterile surgical marker, the primary defect shape was transferred to the donor site. The epidermal graft was then harvested.  The skin graft was then placed in the primary defect and oriented appropriately.
Dermal Autograft Text: The defect edges were debeveled with a #15 scalpel blade. Given the location of the defect, shape of the defect and the proximity to free margins a dermal autograft was deemed most appropriate. Using a sterile surgical marker, the primary defect shape was transferred to the donor site. The area thus outlined was incised deep to adipose tissue with a #15 scalpel blade.  The harvested graft was then trimmed of adipose and epidermal tissue until only dermis was left.  The skin graft was then placed in the primary defect and oriented appropriately.
Skin Substitute Text: The defect edges were debeveled with a #15 scalpel blade. Given the location of the defect, shape of the defect and the proximity to free margins a skin substitute graft was deemed most appropriate.  The graft material was trimmed to fit the size of the defect. The graft was then placed in the primary defect and oriented appropriately.
Tissue Cultured Epidermal Autograft Text: The defect edges were debeveled with a #15 scalpel blade. Given the location of the defect, shape of the defect and the proximity to free margins a tissue cultured epidermal autograft was deemed most appropriate.  The graft was then trimmed to fit the size of the defect.  The graft was then placed in the primary defect and oriented appropriately.
Xenograft Text: The defect edges were debeveled with a #15 scalpel blade. Given the location of the defect, shape of the defect and the proximity to free margins a xenograft was deemed most appropriate.  The graft was then trimmed to fit the size of the defect.  The graft was then placed in the primary defect and oriented appropriately.
Purse String (Intermediate) Text: Given the location of the defect and the characteristics of the surrounding skin a purse string intermediate closure was deemed most appropriate.  Undermining was performed circumferentially around the surgical defect.  A purse string suture was then placed and tightened.
Purse String (Simple) Text: Given the location of the defect and the characteristics of the surrounding skin a purse string simple closure was deemed most appropriate.  Undermining was performed circumferentially around the surgical defect.  A purse string suture was then placed and tightened.
Partial Purse String (Intermediate) Text: Given the location of the defect and the characteristics of the surrounding skin an intermediate purse string closure was deemed most appropriate.  Undermining was performed circumferentially around the surgical defect.  A purse string suture was then placed and tightened. Wound tension of the circular defect prevented complete closure of the wound.
Partial Purse String (Simple) Text: Given the location of the defect and the characteristics of the surrounding skin a simple purse string closure was deemed most appropriate.  Undermining was performed circumferentially around the surgical defect.  A purse string suture was then placed and tightened. Wound tension of the circular defect prevented complete closure of the wound.
Complex Repair And Single Advancement Flap Text: The defect edges were debeveled with a #15 scalpel blade.  The primary defect was closed partially with a complex linear closure.  Given the location of the remaining defect, shape of the defect and the proximity to free margins a single advancement flap was deemed most appropriate for complete closure of the defect.  Using a sterile surgical marker, an appropriate advancement flap was drawn incorporating the defect and placing the expected incisions within the relaxed skin tension lines where possible. The area thus outlined was incised deep to adipose tissue with a #15 scalpel blade. The skin margins were undermined to an appropriate distance in all directions utilizing iris scissors and carried over to close the primary defect.
Complex Repair And Double Advancement Flap Text: The defect edges were debeveled with a #15 scalpel blade.  The primary defect was closed partially with a complex linear closure.  Given the location of the remaining defect, shape of the defect and the proximity to free margins a double advancement flap was deemed most appropriate for complete closure of the defect.  Using a sterile surgical marker, an appropriate advancement flap was drawn incorporating the defect and placing the expected incisions within the relaxed skin tension lines where possible. The area thus outlined was incised deep to adipose tissue with a #15 scalpel blade. The skin margins were undermined to an appropriate distance in all directions utilizing iris scissors and carried over to close the primary defect.
Complex Repair And Modified Advancement Flap Text: The defect edges were debeveled with a #15 scalpel blade.  The primary defect was closed partially with a complex linear closure.  Given the location of the remaining defect, shape of the defect and the proximity to free margins a modified advancement flap was deemed most appropriate for complete closure of the defect.  Using a sterile surgical marker, an appropriate advancement flap was drawn incorporating the defect and placing the expected incisions within the relaxed skin tension lines where possible. The area thus outlined was incised deep to adipose tissue with a #15 scalpel blade. The skin margins were undermined to an appropriate distance in all directions utilizing iris scissors and carried over to close the primary defect.
Complex Repair And A-T Advancement Flap Text: The defect edges were debeveled with a #15 scalpel blade.  The primary defect was closed partially with a complex linear closure.  Given the location of the remaining defect, shape of the defect and the proximity to free margins an A-T advancement flap was deemed most appropriate for complete closure of the defect.  Using a sterile surgical marker, an appropriate advancement flap was drawn incorporating the defect and placing the expected incisions within the relaxed skin tension lines where possible. The area thus outlined was incised deep to adipose tissue with a #15 scalpel blade. The skin margins were undermined to an appropriate distance in all directions utilizing iris scissors and carried over to close the primary defect.
Complex Repair And O-T Advancement Flap Text: The defect edges were debeveled with a #15 scalpel blade.  The primary defect was closed partially with a complex linear closure.  Given the location of the remaining defect, shape of the defect and the proximity to free margins an O-T advancement flap was deemed most appropriate for complete closure of the defect.  Using a sterile surgical marker, an appropriate advancement flap was drawn incorporating the defect and placing the expected incisions within the relaxed skin tension lines where possible. The area thus outlined was incised deep to adipose tissue with a #15 scalpel blade. The skin margins were undermined to an appropriate distance in all directions utilizing iris scissors and carried over to close the primary defect.
Complex Repair And O-L Flap Text: The defect edges were debeveled with a #15 scalpel blade.  The primary defect was closed partially with a complex linear closure.  Given the location of the remaining defect, shape of the defect and the proximity to free margins an O-L flap was deemed most appropriate for complete closure of the defect.  Using a sterile surgical marker, an appropriate flap was drawn incorporating the defect and placing the expected incisions within the relaxed skin tension lines where possible. The area thus outlined was incised deep to adipose tissue with a #15 scalpel blade. The skin margins were undermined to an appropriate distance in all directions utilizing iris scissors and carried over to close the primary defect.
Complex Repair And Bilobe Flap Text: The defect edges were debeveled with a #15 scalpel blade.  The primary defect was closed partially with a complex linear closure.  Given the location of the remaining defect, shape of the defect and the proximity to free margins a bilobe flap was deemed most appropriate for complete closure of the defect.  Using a sterile surgical marker, an appropriate advancement flap was drawn incorporating the defect and placing the expected incisions within the relaxed skin tension lines where possible. The area thus outlined was incised deep to adipose tissue with a #15 scalpel blade. The skin margins were undermined to an appropriate distance in all directions utilizing iris scissors and carried over to close the primary defect.
Complex Repair And Melolabial Flap Text: The defect edges were debeveled with a #15 scalpel blade.  The primary defect was closed partially with a complex linear closure.  Given the location of the remaining defect, shape of the defect and the proximity to free margins a melolabial flap was deemed most appropriate for complete closure of the defect.  Using a sterile surgical marker, an appropriate advancement flap was drawn incorporating the defect and placing the expected incisions within the relaxed skin tension lines where possible. The area thus outlined was incised deep to adipose tissue with a #15 scalpel blade. The skin margins were undermined to an appropriate distance in all directions utilizing iris scissors and carried over to close the primary defect.
Complex Repair And Rotation Flap Text: The defect edges were debeveled with a #15 scalpel blade.  The primary defect was closed partially with a complex linear closure.  Given the location of the remaining defect, shape of the defect and the proximity to free margins a rotation flap was deemed most appropriate for complete closure of the defect.  Using a sterile surgical marker, an appropriate advancement flap was drawn incorporating the defect and placing the expected incisions within the relaxed skin tension lines where possible. The area thus outlined was incised deep to adipose tissue with a #15 scalpel blade. The skin margins were undermined to an appropriate distance in all directions utilizing iris scissors and carried over to close the primary defect.
Complex Repair And Rhombic Flap Text: The defect edges were debeveled with a #15 scalpel blade.  The primary defect was closed partially with a complex linear closure.  Given the location of the remaining defect, shape of the defect and the proximity to free margins a rhombic flap was deemed most appropriate for complete closure of the defect.  Using a sterile surgical marker, an appropriate advancement flap was drawn incorporating the defect and placing the expected incisions within the relaxed skin tension lines where possible. The area thus outlined was incised deep to adipose tissue with a #15 scalpel blade. The skin margins were undermined to an appropriate distance in all directions utilizing iris scissors and carried over to close the primary defect.
Complex Repair And Transposition Flap Text: The defect edges were debeveled with a #15 scalpel blade.  The primary defect was closed partially with a complex linear closure.  Given the location of the remaining defect, shape of the defect and the proximity to free margins a transposition flap was deemed most appropriate for complete closure of the defect.  Using a sterile surgical marker, an appropriate advancement flap was drawn incorporating the defect and placing the expected incisions within the relaxed skin tension lines where possible. The area thus outlined was incised deep to adipose tissue with a #15 scalpel blade. The skin margins were undermined to an appropriate distance in all directions utilizing iris scissors and carried over to close the primary defect.
Complex Repair And V-Y Plasty Text: The defect edges were debeveled with a #15 scalpel blade.  The primary defect was closed partially with a complex linear closure.  Given the location of the remaining defect, shape of the defect and the proximity to free margins a V-Y plasty was deemed most appropriate for complete closure of the defect.  Using a sterile surgical marker, an appropriate advancement flap was drawn incorporating the defect and placing the expected incisions within the relaxed skin tension lines where possible. The area thus outlined was incised deep to adipose tissue with a #15 scalpel blade. The skin margins were undermined to an appropriate distance in all directions utilizing iris scissors and carried over to close the primary defect.
Complex Repair And M Plasty Text: The defect edges were debeveled with a #15 scalpel blade.  The primary defect was closed partially with a complex linear closure.  Given the location of the remaining defect, shape of the defect and the proximity to free margins an M plasty was deemed most appropriate for complete closure of the defect.  Using a sterile surgical marker, an appropriate advancement flap was drawn incorporating the defect and placing the expected incisions within the relaxed skin tension lines where possible. The area thus outlined was incised deep to adipose tissue with a #15 scalpel blade. The skin margins were undermined to an appropriate distance in all directions utilizing iris scissors and carried over to close the primary defect.
Complex Repair And Double M Plasty Text: The defect edges were debeveled with a #15 scalpel blade.  The primary defect was closed partially with a complex linear closure.  Given the location of the remaining defect, shape of the defect and the proximity to free margins a double M plasty was deemed most appropriate for complete closure of the defect.  Using a sterile surgical marker, an appropriate advancement flap was drawn incorporating the defect and placing the expected incisions within the relaxed skin tension lines where possible. The area thus outlined was incised deep to adipose tissue with a #15 scalpel blade. The skin margins were undermined to an appropriate distance in all directions utilizing iris scissors and carried over to close the primary defect.
Complex Repair And W Plasty Text: The defect edges were debeveled with a #15 scalpel blade.  The primary defect was closed partially with a complex linear closure.  Given the location of the remaining defect, shape of the defect and the proximity to free margins a W plasty was deemed most appropriate for complete closure of the defect.  Using a sterile surgical marker, an appropriate advancement flap was drawn incorporating the defect and placing the expected incisions within the relaxed skin tension lines where possible. The area thus outlined was incised deep to adipose tissue with a #15 scalpel blade. The skin margins were undermined to an appropriate distance in all directions utilizing iris scissors and carried over to close the primary defect.
Complex Repair And Z Plasty Text: The defect edges were debeveled with a #15 scalpel blade.  The primary defect was closed partially with a complex linear closure.  Given the location of the remaining defect, shape of the defect and the proximity to free margins a Z plasty was deemed most appropriate for complete closure of the defect.  Using a sterile surgical marker, an appropriate advancement flap was drawn incorporating the defect and placing the expected incisions within the relaxed skin tension lines where possible. The area thus outlined was incised deep to adipose tissue with a #15 scalpel blade. The skin margins were undermined to an appropriate distance in all directions utilizing iris scissors and carried over to close the primary defect.
Complex Repair And Dorsal Nasal Flap Text: The defect edges were debeveled with a #15 scalpel blade.  The primary defect was closed partially with a complex linear closure.  Given the location of the remaining defect, shape of the defect and the proximity to free margins a dorsal nasal flap was deemed most appropriate for complete closure of the defect.  Using a sterile surgical marker, an appropriate flap was drawn incorporating the defect and placing the expected incisions within the relaxed skin tension lines where possible. The area thus outlined was incised deep to adipose tissue with a #15 scalpel blade. The skin margins were undermined to an appropriate distance in all directions utilizing iris scissors and carried over to close the primary defect.
Complex Repair And Ftsg Text: The defect edges were debeveled with a #15 scalpel blade.  The primary defect was closed partially with a complex linear closure.  Given the location of the defect, shape of the defect and the proximity to free margins a full thickness skin graft was deemed most appropriate to repair the remaining defect.  The graft was trimmed to fit the size of the remaining defect.  The graft was then placed in the primary defect, oriented appropriately, and sutured into place.
Complex Repair And Burow's Graft Text: The defect edges were debeveled with a #15 scalpel blade.  The primary defect was closed partially with a complex linear closure.  Given the location of the defect, shape of the defect, the proximity to free margins and the presence of a standing cone deformity a Burow's graft was deemed most appropriate to repair the remaining defect.  The graft was trimmed to fit the size of the remaining defect.  The graft was then placed in the primary defect, oriented appropriately, and sutured into place.
Complex Repair And Split-Thickness Skin Graft Text: The defect edges were debeveled with a #15 scalpel blade.  The primary defect was closed partially with a complex linear closure.  Given the location of the defect, shape of the defect and the proximity to free margins a split thickness skin graft was deemed most appropriate to repair the remaining defect.  The graft was trimmed to fit the size of the remaining defect.  The graft was then placed in the primary defect, oriented appropriately, and sutured into place.
Complex Repair And Epidermal Autograft Text: The defect edges were debeveled with a #15 scalpel blade.  The primary defect was closed partially with a complex linear closure.  Given the location of the defect, shape of the defect and the proximity to free margins an epidermal autograft was deemed most appropriate to repair the remaining defect.  The graft was trimmed to fit the size of the remaining defect.  The graft was then placed in the primary defect, oriented appropriately, and sutured into place.
Complex Repair And Dermal Autograft Text: The defect edges were debeveled with a #15 scalpel blade.  The primary defect was closed partially with a complex linear closure.  Given the location of the defect, shape of the defect and the proximity to free margins an dermal autograft was deemed most appropriate to repair the remaining defect.  The graft was trimmed to fit the size of the remaining defect.  The graft was then placed in the primary defect, oriented appropriately, and sutured into place.
Complex Repair And Tissue Cultured Epidermal Autograft Text: The defect edges were debeveled with a #15 scalpel blade.  The primary defect was closed partially with a complex linear closure.  Given the location of the defect, shape of the defect and the proximity to free margins an tissue cultured epidermal autograft was deemed most appropriate to repair the remaining defect.  The graft was trimmed to fit the size of the remaining defect.  The graft was then placed in the primary defect, oriented appropriately, and sutured into place.
Complex Repair And Xenograft Text: The defect edges were debeveled with a #15 scalpel blade.  The primary defect was closed partially with a complex linear closure.  Given the location of the defect, shape of the defect and the proximity to free margins a xenograft was deemed most appropriate to repair the remaining defect.  The graft was trimmed to fit the size of the remaining defect.  The graft was then placed in the primary defect, oriented appropriately, and sutured into place.
Complex Repair And Skin Substitute Graft Text: The defect edges were debeveled with a #15 scalpel blade.  The primary defect was closed partially with a complex linear closure.  Given the location of the remaining defect, shape of the defect and the proximity to free margins a skin substitute graft was deemed most appropriate to repair the remaining defect.  The graft was trimmed to fit the size of the remaining defect.  The graft was then placed in the primary defect, oriented appropriately, and sutured into place.
Path Notes (To The Dermatopathologist): Please check margins.
Consent was obtained from the patient. The risks and benefits to therapy were discussed in detail. Specifically, the risks of infection, scarring, bleeding, prolonged wound healing, incomplete removal, allergy to anesthesia, nerve injury and recurrence were addressed. Prior to the procedure, the treatment site was clearly identified and confirmed by the patient. All components of Universal Protocol/PAUSE Rule completed.
Render Post-Care Instructions In Note?: yes
Post-Care Instructions: I reviewed with the patient in detail post-care instructions. Patient is not to engage in any heavy lifting, exercise, or swimming for the next 14 days. Should the patient develop any fevers, chills, bleeding, severe pain patient will contact the office immediately.
Home Suture Removal Text: Patient was provided a home suture removal kit and will remove their sutures at home.  If they have any questions or difficulties they will call the office.
Where Do You Want The Question To Include Opioid Counseling Located?: Case Summary Tab
Information: Selecting Yes will display possible errors in your note based on the variables you have selected. This validation is only offered as a suggestion for you. PLEASE NOTE THAT THE VALIDATION TEXT WILL BE REMOVED WHEN YOU FINALIZE YOUR NOTE. IF YOU WANT TO FAX A PRELIMINARY NOTE YOU WILL NEED TO TOGGLE THIS TO 'NO' IF YOU DO NOT WANT IT IN YOUR FAXED NOTE.

## 2024-02-09 NOTE — TELEPHONE ENCOUNTER
PC with patient complete patient education prep. Reviewed of prep letter. Pt viewed and read prep letter via my cahrt. Review of labs, already posting. Opportunity to ask questions and have them answered. None remaining at end of call. Orders placed. NEVAEH,Rn

## 2024-02-09 NOTE — TELEPHONE ENCOUNTER
"Jay Roth  [address] [place] [id number]  [phone] (home)     Reason: Chest pain, CAD on CTA testing   Procedure cardiologist:  Dr Head   PCP:  Aravind Villa  H&P completed by:  Dr. Prajapati on 1/17/24  Admit date 2/12/24  Arrival time:  0630  Anticoagulation: None  CPAP: No. Does have \"mild JIGNA\"  Previous PCI: None  Bypass Grafts: No  Renal Issues: No  Diabetic?: No  Device?: No    Ambulatory?: Independently     Angiogram Teaching    Reason for Visit:  Telephone call to discuss pre-procedure education in preparation for: Coronary Angiogram with Possible Percutaneous Coronary Intervention     Procedure Prep:  Primary Cardiologist note dated: 1/17/24  EKG results obtained, dated: Morning of procedure  Pre-procedure labs: Hemogram, BMP and T&S results obtained: 2/9/24  Lipid Profile results obtained: 10/17/23, 2/9/24    Pre-procedure instructions  In preparation for this procedure, we would ask that you have:  No solid food for 8 hours prior to your procedure: Midnight  No clear liquids 2 hours prior to your procedure: 0430    Patient instructed to shower the evening before or the morning of the procedure.  Leave all valuables at home (jewlery, rings, watches, large amounts of money).  Patient understands (2) visitors allowed during patients stay.   Patient instructed to arrange for transportation home following procedure from a responsible family member of friend. No driving for at least 24 hours post-procedure.  Patient instructed to have a responsible adult with them for 24 hours post-procedure.  Post-procedure follow up process.  Conscious sedation discussed.    Pre-procedure medication instructions  Patient instructed on antiplatelet medication.  Continue medications as scheduled, with a small amount of water on the day of the procedure unless indicated.  Patient instructed to take 325 mg of Aspirin am of procedure: Yes  Other medication: Morning of procedure please HOLD all " vitamins, minerals, and supplements. Please TAKE (4) baby aspirin or (1) full size 325 mg aspirin morning of procedure.      *PATIENTS RECORDS AVAILABLE IN [facility] UNLESS OTHERWISE INDICATED*    Patient Active Problem List   Diagnosis    Smokeless tobacco use    Hyperlipidemia LDL goal <100    Benign Prostatic Hypertrophy    Cerumen Impaction    Lower Back Pain    Diverticulosis    Hematuria    JIGNA (obstructive sleep apnea)    Benign Essential Hypertension    Insomnia    Colon polyp    History of Bell's palsy    Hyperbilirubinemia    Acute diverticulitis    Non-recurrent unilateral inguinal hernia without obstruction or gangrene    Diverticulitis    GALINDO (acute kidney injury) (H24)    Acute kidney injury (H24)    Nausea and vomiting, intractability of vomiting not specified, unspecified vomiting type    Recurrent Clostridioides difficile diarrhea    Abdominal cramping    Emesis    Obesity (BMI 35.0-39.9) with comorbidity (H)    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 37.0 to 37.9 in adult    Serrated adenoma of colon    BPH with urinary obstruction    Urinary incontinence, unspecified type       Current Outpatient Medications   Medication Sig Dispense Refill    amLODIPine (NORVASC) 5 MG tablet TAKE 1 TABLET(5 MG) BY MOUTH DAILY 90 tablet 2    amoxicillin (AMOXIL) 500 MG tablet TAKE 4 TABLETS BY MOUTH 1 HOUR BEFORE DENTAL APPOINTMENT      cyclobenzaprine (FLEXERIL) 5 MG tablet Take 5 mg by mouth 3 times daily as needed (Patient not taking: Reported on 1/17/2024)      fish oil-omega-3 fatty acids 1000 MG capsule Take 2 g by mouth daily      medical cannabis (Patient's own supply) by Other route See Admin Instructions      multivitamin (ONE A DAY) per tablet Take 1 tablet by mouth daily      omeprazole (PRILOSEC) 20 MG DR capsule Take 1 capsule (20 mg) by mouth daily 30 capsule 5    pravastatin (PRAVACHOL) 20 MG tablet TAKE 1 TABLET(20 MG) BY MOUTH EVERY EVENING 90 tablet 2     tamsulosin (FLOMAX) 0.4 MG capsule TAKE 2 CAPSULES(0.8 MG) BY MOUTH DAILY 180 capsule 3    triamcinolone (KENALOG) 0.1 % external ointment APPLY TO AFFECTED AREAS TWICE DAILY. TAPER WITH IMPROVEMENT         Allergies   Allergen Reactions    Atorvastatin Calcium [Atorvastatin] Dizziness       Procedure order set placed: 2/9/24    Plan: Patient education completed. Patient will be accompanied by his wife, Lovely. Opportunity to ask questions and have them answered. None further at this time. Patient verbalized understanding of responsible adult for 24 hours and  post-procedure at time of discharge. Patient ready for procedure.     aHrris Rebolledo RN

## 2024-02-12 ENCOUNTER — HOSPITAL ENCOUNTER (OUTPATIENT)
Facility: HOSPITAL | Age: 69
Discharge: HOME | End: 2024-02-12
Attending: INTERNAL MEDICINE | Admitting: INTERNAL MEDICINE
Payer: COMMERCIAL

## 2024-02-12 VITALS
HEIGHT: 70 IN | WEIGHT: 258 LBS | HEART RATE: 81 BPM | BODY MASS INDEX: 36.94 KG/M2 | DIASTOLIC BLOOD PRESSURE: 85 MMHG | SYSTOLIC BLOOD PRESSURE: 155 MMHG | TEMPERATURE: 98 F | RESPIRATION RATE: 16 BRPM | OXYGEN SATURATION: 95 %

## 2024-02-12 DIAGNOSIS — Z95.5 S/P CORONARY ARTERY STENT PLACEMENT: Primary | ICD-10-CM

## 2024-02-12 DIAGNOSIS — E78.2 MIXED HYPERLIPIDEMIA: ICD-10-CM

## 2024-02-12 DIAGNOSIS — R07.9 CHEST PAIN: ICD-10-CM

## 2024-02-12 DIAGNOSIS — I10 PRIMARY HYPERTENSION: ICD-10-CM

## 2024-02-12 DIAGNOSIS — I25.10 CORONARY ARTERY CALCIFICATION SEEN ON CT SCAN: ICD-10-CM

## 2024-02-12 PROBLEM — Z98.890 STATUS POST CORONARY ANGIOGRAM: Status: ACTIVE | Noted: 2024-02-12

## 2024-02-12 LAB
ACT BLD: 247 SECONDS (ref 74–150)
ACT BLD: 298 SECONDS (ref 74–150)
ATRIAL RATE - MUSE: 75 BPM
ATRIAL RATE - MUSE: 76 BPM
DIASTOLIC BLOOD PRESSURE - MUSE: NORMAL MMHG
DIASTOLIC BLOOD PRESSURE - MUSE: NORMAL MMHG
INTERPRETATION ECG - MUSE: NORMAL
INTERPRETATION ECG - MUSE: NORMAL
P AXIS - MUSE: 52 DEGREES
P AXIS - MUSE: 53 DEGREES
PR INTERVAL - MUSE: 188 MS
PR INTERVAL - MUSE: 188 MS
QRS DURATION - MUSE: 102 MS
QRS DURATION - MUSE: 112 MS
QT - MUSE: 368 MS
QT - MUSE: 384 MS
QTC - MUSE: 410 MS
QTC - MUSE: 432 MS
R AXIS - MUSE: -14 DEGREES
R AXIS - MUSE: -4 DEGREES
SYSTOLIC BLOOD PRESSURE - MUSE: NORMAL MMHG
SYSTOLIC BLOOD PRESSURE - MUSE: NORMAL MMHG
T AXIS - MUSE: 22 DEGREES
T AXIS - MUSE: 38 DEGREES
VENTRICULAR RATE- MUSE: 75 BPM
VENTRICULAR RATE- MUSE: 76 BPM

## 2024-02-12 PROCEDURE — 93799 UNLISTED CV SVC/PROCEDURE: CPT | Performed by: INTERNAL MEDICINE

## 2024-02-12 PROCEDURE — 93458 L HRT ARTERY/VENTRICLE ANGIO: CPT | Performed by: INTERNAL MEDICINE

## 2024-02-12 PROCEDURE — 93010 ELECTROCARDIOGRAM REPORT: CPT | Mod: 76 | Performed by: STUDENT IN AN ORGANIZED HEALTH CARE EDUCATION/TRAINING PROGRAM

## 2024-02-12 PROCEDURE — 999N000054 HC STATISTIC EKG NON-CHARGEABLE

## 2024-02-12 PROCEDURE — 85347 COAGULATION TIME ACTIVATED: CPT

## 2024-02-12 PROCEDURE — C1874 STENT, COATED/COV W/DEL SYS: HCPCS | Performed by: INTERNAL MEDICINE

## 2024-02-12 PROCEDURE — 99152 MOD SED SAME PHYS/QHP 5/>YRS: CPT | Performed by: INTERNAL MEDICINE

## 2024-02-12 PROCEDURE — 93458 L HRT ARTERY/VENTRICLE ANGIO: CPT | Mod: 26 | Performed by: INTERNAL MEDICINE

## 2024-02-12 PROCEDURE — C1725 CATH, TRANSLUMIN NON-LASER: HCPCS | Performed by: INTERNAL MEDICINE

## 2024-02-12 PROCEDURE — 272N000001 HC OR GENERAL SUPPLY STERILE: Performed by: INTERNAL MEDICINE

## 2024-02-12 PROCEDURE — 93005 ELECTROCARDIOGRAM TRACING: CPT

## 2024-02-12 PROCEDURE — C1769 GUIDE WIRE: HCPCS | Performed by: INTERNAL MEDICINE

## 2024-02-12 PROCEDURE — 93010 ELECTROCARDIOGRAM REPORT: CPT | Mod: 59 | Performed by: STUDENT IN AN ORGANIZED HEALTH CARE EDUCATION/TRAINING PROGRAM

## 2024-02-12 PROCEDURE — 93571 IV DOP VEL&/PRESS C FLO 1ST: CPT | Mod: 26 | Performed by: INTERNAL MEDICINE

## 2024-02-12 PROCEDURE — C9600 PERC DRUG-EL COR STENT SING: HCPCS | Performed by: INTERNAL MEDICINE

## 2024-02-12 PROCEDURE — 255N000002 HC RX 255 OP 636: Performed by: INTERNAL MEDICINE

## 2024-02-12 PROCEDURE — 250N000011 HC RX IP 250 OP 636: Performed by: INTERNAL MEDICINE

## 2024-02-12 PROCEDURE — C1894 INTRO/SHEATH, NON-LASER: HCPCS | Performed by: INTERNAL MEDICINE

## 2024-02-12 PROCEDURE — C1887 CATHETER, GUIDING: HCPCS | Performed by: INTERNAL MEDICINE

## 2024-02-12 PROCEDURE — 99153 MOD SED SAME PHYS/QHP EA: CPT | Performed by: INTERNAL MEDICINE

## 2024-02-12 PROCEDURE — 92928 PRQ TCAT PLMT NTRAC ST 1 LES: CPT | Mod: RC | Performed by: INTERNAL MEDICINE

## 2024-02-12 PROCEDURE — 250N000013 HC RX MED GY IP 250 OP 250 PS 637: Performed by: INTERNAL MEDICINE

## 2024-02-12 PROCEDURE — 250N000009 HC RX 250: Performed by: INTERNAL MEDICINE

## 2024-02-12 DEVICE — STENT CORONARY DES SYNERGY XD MR US 4.00X20MM H7493941820400: Type: IMPLANTABLE DEVICE | Site: CORONARY | Status: FUNCTIONAL

## 2024-02-12 RX ORDER — CLOPIDOGREL BISULFATE 75 MG/1
75 TABLET ORAL DAILY
Status: DISCONTINUED | OUTPATIENT
Start: 2024-02-13 | End: 2024-02-12 | Stop reason: HOSPADM

## 2024-02-12 RX ORDER — IODIXANOL 320 MG/ML
INJECTION, SOLUTION INTRAVASCULAR
Status: DISCONTINUED | OUTPATIENT
Start: 2024-02-12 | End: 2024-02-12 | Stop reason: HOSPADM

## 2024-02-12 RX ORDER — ASPIRIN 325 MG
325 TABLET ORAL ONCE
Status: DISCONTINUED | OUTPATIENT
Start: 2024-02-12 | End: 2024-02-12 | Stop reason: HOSPADM

## 2024-02-12 RX ORDER — FENTANYL CITRATE 50 UG/ML
25 INJECTION, SOLUTION INTRAMUSCULAR; INTRAVENOUS
Status: DISCONTINUED | OUTPATIENT
Start: 2024-02-12 | End: 2024-02-12 | Stop reason: HOSPADM

## 2024-02-12 RX ORDER — NITROGLYCERIN 5 MG/ML
VIAL (ML) INTRAVENOUS
Status: DISCONTINUED | OUTPATIENT
Start: 2024-02-12 | End: 2024-02-12 | Stop reason: HOSPADM

## 2024-02-12 RX ORDER — METOPROLOL TARTRATE 1 MG/ML
5 INJECTION, SOLUTION INTRAVENOUS
Status: DISCONTINUED | OUTPATIENT
Start: 2024-02-12 | End: 2024-02-12 | Stop reason: HOSPADM

## 2024-02-12 RX ORDER — ONDANSETRON 2 MG/ML
4 INJECTION INTRAMUSCULAR; INTRAVENOUS EVERY 6 HOURS PRN
Status: DISCONTINUED | OUTPATIENT
Start: 2024-02-12 | End: 2024-02-12 | Stop reason: HOSPADM

## 2024-02-12 RX ORDER — ATROPINE SULFATE 0.1 MG/ML
0.5 INJECTION INTRAVENOUS
Status: DISCONTINUED | OUTPATIENT
Start: 2024-02-12 | End: 2024-02-12 | Stop reason: HOSPADM

## 2024-02-12 RX ORDER — ROSUVASTATIN CALCIUM 10 MG/1
20 TABLET, COATED ORAL DAILY
Status: DISCONTINUED | OUTPATIENT
Start: 2024-02-12 | End: 2024-02-12 | Stop reason: HOSPADM

## 2024-02-12 RX ORDER — OXYCODONE HYDROCHLORIDE 5 MG/1
5 TABLET ORAL EVERY 4 HOURS PRN
Status: DISCONTINUED | OUTPATIENT
Start: 2024-02-12 | End: 2024-02-12 | Stop reason: HOSPADM

## 2024-02-12 RX ORDER — CLOPIDOGREL BISULFATE 75 MG/1
TABLET ORAL
Status: DISCONTINUED | OUTPATIENT
Start: 2024-02-12 | End: 2024-02-12 | Stop reason: HOSPADM

## 2024-02-12 RX ORDER — NITROGLYCERIN 0.4 MG/1
0.4 TABLET SUBLINGUAL EVERY 5 MIN PRN
Status: DISCONTINUED | OUTPATIENT
Start: 2024-02-12 | End: 2024-02-12 | Stop reason: HOSPADM

## 2024-02-12 RX ORDER — NALOXONE HYDROCHLORIDE 0.4 MG/ML
0.2 INJECTION, SOLUTION INTRAMUSCULAR; INTRAVENOUS; SUBCUTANEOUS
Status: DISCONTINUED | OUTPATIENT
Start: 2024-02-12 | End: 2024-02-12 | Stop reason: HOSPADM

## 2024-02-12 RX ORDER — ROSUVASTATIN CALCIUM 20 MG/1
20 TABLET, COATED ORAL DAILY
Qty: 90 TABLET | Refills: 3 | Status: SHIPPED | OUTPATIENT
Start: 2024-02-12

## 2024-02-12 RX ORDER — OXYCODONE HYDROCHLORIDE 5 MG/1
10 TABLET ORAL EVERY 4 HOURS PRN
Status: DISCONTINUED | OUTPATIENT
Start: 2024-02-12 | End: 2024-02-12 | Stop reason: HOSPADM

## 2024-02-12 RX ORDER — SODIUM CHLORIDE 9 MG/ML
INJECTION, SOLUTION INTRAVENOUS CONTINUOUS
Status: DISCONTINUED | OUTPATIENT
Start: 2024-02-12 | End: 2024-02-12 | Stop reason: HOSPADM

## 2024-02-12 RX ORDER — HEPARIN SODIUM 1000 [USP'U]/ML
INJECTION, SOLUTION INTRAVENOUS; SUBCUTANEOUS
Status: DISCONTINUED | OUTPATIENT
Start: 2024-02-12 | End: 2024-02-12 | Stop reason: HOSPADM

## 2024-02-12 RX ORDER — PANTOPRAZOLE SODIUM 20 MG/1
40 TABLET, DELAYED RELEASE ORAL DAILY
Qty: 30 TABLET | Refills: 3 | Status: SHIPPED | OUTPATIENT
Start: 2024-02-12 | End: 2024-02-13

## 2024-02-12 RX ORDER — CLOPIDOGREL BISULFATE 75 MG/1
75 TABLET ORAL DAILY
Qty: 90 TABLET | Refills: 3 | Status: SHIPPED | OUTPATIENT
Start: 2024-02-13

## 2024-02-12 RX ORDER — ONDANSETRON 4 MG/1
4 TABLET, ORALLY DISINTEGRATING ORAL EVERY 6 HOURS PRN
Status: DISCONTINUED | OUTPATIENT
Start: 2024-02-12 | End: 2024-02-12 | Stop reason: HOSPADM

## 2024-02-12 RX ORDER — ASPIRIN 81 MG/1
81 TABLET ORAL DAILY
Status: DISCONTINUED | OUTPATIENT
Start: 2024-02-13 | End: 2024-02-12 | Stop reason: HOSPADM

## 2024-02-12 RX ORDER — FENTANYL CITRATE 50 UG/ML
INJECTION, SOLUTION INTRAMUSCULAR; INTRAVENOUS
Status: DISCONTINUED | OUTPATIENT
Start: 2024-02-12 | End: 2024-02-12 | Stop reason: HOSPADM

## 2024-02-12 RX ORDER — HYDRALAZINE HYDROCHLORIDE 20 MG/ML
10 INJECTION INTRAMUSCULAR; INTRAVENOUS EVERY 4 HOURS PRN
Status: DISCONTINUED | OUTPATIENT
Start: 2024-02-12 | End: 2024-02-12 | Stop reason: HOSPADM

## 2024-02-12 RX ORDER — LIDOCAINE 40 MG/G
CREAM TOPICAL
Status: DISCONTINUED | OUTPATIENT
Start: 2024-02-12 | End: 2024-02-12 | Stop reason: HOSPADM

## 2024-02-12 RX ORDER — ASPIRIN 81 MG/1
243 TABLET, CHEWABLE ORAL ONCE
Status: DISCONTINUED | OUTPATIENT
Start: 2024-02-12 | End: 2024-02-12 | Stop reason: HOSPADM

## 2024-02-12 RX ORDER — FLUMAZENIL 0.1 MG/ML
0.2 INJECTION, SOLUTION INTRAVENOUS
Status: DISCONTINUED | OUTPATIENT
Start: 2024-02-12 | End: 2024-02-12 | Stop reason: HOSPADM

## 2024-02-12 RX ORDER — NALOXONE HYDROCHLORIDE 0.4 MG/ML
0.4 INJECTION, SOLUTION INTRAMUSCULAR; INTRAVENOUS; SUBCUTANEOUS
Status: DISCONTINUED | OUTPATIENT
Start: 2024-02-12 | End: 2024-02-12 | Stop reason: HOSPADM

## 2024-02-12 RX ORDER — ACETAMINOPHEN 325 MG/1
650 TABLET ORAL EVERY 4 HOURS PRN
Status: DISCONTINUED | OUTPATIENT
Start: 2024-02-12 | End: 2024-02-12 | Stop reason: HOSPADM

## 2024-02-12 RX ORDER — ASPIRIN 81 MG/1
81 TABLET ORAL DAILY
Qty: 30 TABLET | Refills: 3 | Status: SHIPPED | OUTPATIENT
Start: 2024-02-13

## 2024-02-12 RX ADMIN — ACETAMINOPHEN 650 MG: 325 TABLET ORAL at 12:31

## 2024-02-12 ASSESSMENT — EJECTION FRACTION: EF_VALUE: .23

## 2024-02-12 ASSESSMENT — ACTIVITIES OF DAILY LIVING (ADL)
ADLS_ACUITY_SCORE: 35

## 2024-02-12 NOTE — PRE-PROCEDURE
GENERAL PRE-PROCEDURE:   Procedure:  Coronary angiogram, possible percutaneous coronary intervention  Date/Time:  2/12/2024 7:11 AM    Written consent obtained?: Yes    Risks and benefits: Risks, benefits and alternatives were discussed    Consent given by:  Patient  Patient states understanding of procedure being performed: Yes    Patient's understanding of procedure matches consent: Yes    Procedure consent matches procedure scheduled: Yes    Expected level of sedation:  Moderate  Appropriately NPO:  Yes  ASA Class:  3 (Moderate coronary calcification on CT, HTN, HLD, class II obesity; BMI 37.02 kg/m2, GERD)  Mallampati  :  Grade 4- soft palate obscured by base of tongue  Lungs:  Lungs clear with good breath sounds bilaterally  Heart:  Normal heart sounds and rate  History & Physical reviewed:  History and physical reviewed and updates made (see comment)  H&P Comments:  History & Physical reviewed:  History and physical reviewed and updates made (see comment)  H&P Comments:  Clinically Significant Risk Factors Present on Admission     Cardiovascular : Moderate coronary calcification on CT, HTN, HLD, class II obesity; BMI 37.02 kg/m2     Fluid & Electrolyte Disorders : Not present on admission     Gastroenterology : GERD     Hematology/Oncology : Not present on admission     Nephrology : Not present on admission     Neurology : Not present on admission     Pulmonology : Not present on admission     Systemic : Not present on admission    Statement of review:  I have reviewed the lab findings, diagnostic data, medications, and the plan for sedation  Statement of review:  I have reviewed the lab findings, diagnostic data, medications, and the plan for sedation

## 2024-02-12 NOTE — Clinical Note
Stent deployed in the middle right coronary artery. Max pressure = 14 edna. Total duration = 20 seconds.

## 2024-02-12 NOTE — Clinical Note
The first balloon was inserted into the right coronary artery and middle right coronary artery.Max pressure = 10 edna. Total duration = 15 seconds.

## 2024-02-12 NOTE — INTERVAL H&P NOTE
"I have reviewed the surgical (or preoperative) H&P that is linked to this encounter, and examined the patient. There are no significant changes    Clinical Conditions Present on Arrival:  Clinically Significant Risk Factors Present on Admission                  # Obesity: Estimated body mass index is 37.02 kg/m  as calculated from the following:    Height as of this encounter: 1.778 m (5' 10\").    Weight as of this encounter: 117 kg (258 lb).       "

## 2024-02-12 NOTE — Clinical Note
The first balloon was inserted into the right coronary artery and middle right coronary artery.Max pressure = 16 edna. Total duration = 14 seconds.

## 2024-02-12 NOTE — Clinical Note
The first balloon was inserted into the right coronary artery and middle right coronary artery.Max pressure = 8 edna. Total duration = 20 seconds.

## 2024-02-12 NOTE — Clinical Note
The first balloon was inserted into the right coronary artery and middle right coronary artery.Max pressure = 14 edna. Total duration = 10 seconds.

## 2024-02-12 NOTE — DISCHARGE INSTRUCTIONS

## 2024-02-13 ENCOUNTER — TELEPHONE (OUTPATIENT)
Dept: CARDIOLOGY | Facility: CLINIC | Age: 69
End: 2024-02-13
Payer: COMMERCIAL

## 2024-02-13 DIAGNOSIS — Z95.5 S/P CORONARY ARTERY STENT PLACEMENT: ICD-10-CM

## 2024-02-13 RX ORDER — PANTOPRAZOLE SODIUM 20 MG/1
20 TABLET, DELAYED RELEASE ORAL DAILY
Qty: 30 TABLET | Refills: 3 | Status: SHIPPED | OUTPATIENT
Start: 2024-02-13 | End: 2024-02-14

## 2024-02-13 NOTE — PROGRESS NOTES
Late entry:    Jay Roth is a 68-year-old WM with past medical history of moderate coronary calcification on CT, HTN, HLD, class II obesity; BMI 37.0 to KG per meter squared, and GERD who underwent elective coronary angiogram which demonstrated severe proximal LAD stenosis resulting in PCI x 1. He was loaded with Plavix and will continue on this medication for a minimum of 12 months. His omperazole was discontinued in favor of pantoprazole while on Plavix to avoid significant [id number] interaction

## 2024-02-13 NOTE — TELEPHONE ENCOUNTER
Incoming call and LVM to return call and discussion Protonix prescription. NEVAEHRN    Returned PC to patient. Review of all 4 Rx sent and receipt at [facility] in [place] yesterday. Pt reports he has been unable to obtain the Protonix. Will call the pharmacy to confirm.   OF note patient mentions that he was feeling ill yesterday, nauseated and vomiting. Able to keep down some fluids, and popsicles. Wondering what could be the cause? Today he is slightly nauseous, improved. No vomiting.   Encouraged hydration, and light walking around the home. Possibly related to the conscious sedation medications. Continue to monitor, hydrate and walk around main level to promote peristalsis. Should be resolved tomorrow. If continues to have nausea and/or vomiting follow-up with PCP. Call back if further questions.       PC to  pharmacy. 3 Rx's picked up. Protonix in the 20 mg form requires a PA. Will check with ordering DANA to see if can be changed to a full 40 mg tablet, and avoid need for Prior Auth. [facility] will fill once known. Will then call patient to inform. Staff message sent to prescriber. NEVAEH,RN

## 2024-02-13 NOTE — TELEPHONE ENCOUNTER
PC with patient and review of new Rx and recommendation. Verbalized understanding. Reports he took a little nap this afternoon, and is feeling even better then previous. Continue to monitor and call if any further questions or concerns prior to appt with MH. NEVAEH,RN

## 2024-02-13 NOTE — TELEPHONE ENCOUNTER
Incoming call from provider, after review. Pt actually can take Protonix 20 mg once daily. Reviewed. Verbal order to send in Rx as 20 mg once daily. Rx updated. Disconinuted previous Rx. Called [facility]. Spoke with pharmacy staff, and discharge previous 40 mg Rx. New Rx sent 20 mg daily, and visualized at the pharmacy. Requested to fill.   PC to patient and LVM of updated inofrmation. Will reattempt to call later. NEVAEH,Rn

## 2024-02-14 RX ORDER — PANTOPRAZOLE SODIUM 20 MG/1
20 TABLET, DELAYED RELEASE ORAL DAILY
Qty: 90 TABLET | Refills: 3 | Status: SHIPPED | OUTPATIENT
Start: 2024-02-14 | End: 2024-04-04

## 2024-02-15 DIAGNOSIS — Z98.890 STATUS POST CORONARY ANGIOGRAM: ICD-10-CM

## 2024-02-15 DIAGNOSIS — I25.10 CORONARY ARTERY CALCIFICATION SEEN ON CT SCAN: Primary | ICD-10-CM

## 2024-02-16 ENCOUNTER — HOSPITAL ENCOUNTER (OUTPATIENT)
Dept: CARDIAC REHAB | Facility: CLINIC | Age: 69
Discharge: HOME | End: 2024-02-16
Attending: INTERNAL MEDICINE
Payer: COMMERCIAL

## 2024-02-16 DIAGNOSIS — Z95.5 S/P CORONARY ARTERY STENT PLACEMENT: ICD-10-CM

## 2024-02-16 DIAGNOSIS — R07.89 CHEST WALL PAIN: ICD-10-CM

## 2024-02-16 DIAGNOSIS — Z98.890 STATUS POST CORONARY ANGIOGRAM: ICD-10-CM

## 2024-02-16 PROCEDURE — 93797 PHYS/QHP OP CAR RHAB WO ECG: CPT | Mod: 59

## 2024-02-16 PROCEDURE — 93798 PHYS/QHP OP CAR RHAB W/ECG: CPT

## 2024-02-19 ENCOUNTER — APPOINTMENT (OUTPATIENT)
Dept: URBAN - METROPOLITAN AREA CLINIC 260 | Age: 69
Setting detail: DERMATOLOGY
End: 2024-02-20

## 2024-02-19 DIAGNOSIS — Z48.02 ENCOUNTER FOR REMOVAL OF SUTURES: ICD-10-CM

## 2024-02-19 PROCEDURE — OTHER MIPS QUALITY: OTHER

## 2024-02-19 PROCEDURE — OTHER PHOTO-DOCUMENTATION: OTHER

## 2024-02-19 PROCEDURE — OTHER SUTURE REMOVAL (GLOBAL PERIOD): OTHER

## 2024-02-19 PROCEDURE — OTHER COUNSELING: OTHER

## 2024-02-19 ASSESSMENT — LOCATION SIMPLE DESCRIPTION DERM: LOCATION SIMPLE: RIGHT SHOULDER

## 2024-02-19 ASSESSMENT — LOCATION DETAILED DESCRIPTION DERM: LOCATION DETAILED: RIGHT POSTERIOR SHOULDER

## 2024-02-19 ASSESSMENT — LOCATION ZONE DERM: LOCATION ZONE: ARM

## 2024-02-19 NOTE — PROCEDURE: PHOTO-DOCUMENTATION
Photo Preface (Leave Blank If You Do Not Want): Photographs of affected skin were obtained today
Detail Level: Detailed

## 2024-02-19 NOTE — PROGRESS NOTES
Assessment/Recommendations   Assessment:    1.  Coronary artery disease: S/p PCI of mid RCA 90% stenosis with KATERINA x 1.  Moderate proximal LAD 50% stenosis is not flow-limiting.  Discussed future monitoring of moderate disease with patient.  - On dual antiplatelet therapy with ASA 81 mg indefinitely and Clopidogrel (Plavix) 75 mg daily for 12 months.  - Patient reports significant improvement in chest discomfort following RCA stent.  Continues to have a near constant 1/2 out of 10 chest discomfort in the left chest.  Differs from previous chest pain in severity, does not worsen with exertion.  Has tolerated exercise at cardiac rehab without symptoms.  - Cardiac rehab has been scheduled  - Reviewed most recent BMP, Hgb, platelet- stable.    2.  Dyslipidemia with LDL goal <70/Obesity: Jay Roth is on high intensity statin therapy with rosuvastatin 20 mg daily. Most recent LDL is 119.      3.  Hypertension: His blood pressure is elevated, resting BP also elevated at cardiac rehab today. Currently on amlodipine 5 mg daily.      Plan:  - We discussed the importance of antiplatelet therapy and talking with his cardiologist prior to stopping these medications for any reason.    - Encouraged to seek medical attention if recurrent chest pain or shortness of breath.    - Increase amlodipine to 10 mg, would also benefit anginal chest pain although I feel patient's atypical chest discomfort is likely cardiac related following stent placement.  Discussed increasing exercise, low-sodium diet, limited alcohol use.     - Discussed increasing pantoprazole to 40 mg daily if he feels reflux continues to be under control    - Continue hyperlipidemia regimen. Fasting lipid profile check in 4-8 weeks. Order placed    - Cardiac rehab as scheduled    - We discussed a diet low in saturated fat, weight loss, and exercise along with medication for better control of cholesterol.  Highly encouraged to participate in nutrition class  in cardiac rehab.  - Risk factor modification and lifestyle management topics were discussed including managing comorbidities, weight loss, heart healthy diet, exercise, and alcohol use.        Follow up with Dr. Prajapati 4/4/2024     History of Present Illness/Subjective    Mr. Jay Roth is a 68 year old male with a past medical history of CAD/PCI, HLD, HTN who is seen at [facility] for post coronary intervention follow up. S/p PCI of mid RCA 90% stenosis with DESx1.  Moderate proximal LAD stenosis is not flow-limiting.    Patient reports improved exertional chest pain following stent placement. Continues to have low-level, mild near constant chest discomfort 1-2/10 since stent placement.  Discomfort is not worsened with exertion and he tolerated cardiac rehab without issue.  He denies missed doses of aspirin or Plavix.  The week following procedure he was experiencing dry heaving, nausea, diarrhea and symptoms resolved about 2 days ago.  He is having mild reflux symptoms on pantoprazole 20 mg which were better controlled with omeprazole.      Patient's blood pressure is elevated today and was so high cardiac rehab as well.  He says he did tolerate increased dose of amlodipine in the past.  Does not monitor frequently at home.  Patient plans to join weight watchers program and start more consistent walking/exercise with his wife.  He has some intermittent lower extremity swelling which seems to worsen with a salty meal and consumption of beer.    He denies fatigue, lightheadedness, shortness of breath, dyspnea on exertion, orthopnea, and palpitations.       Coronary Angiogram 2/12/2024 reviewed:  1.  Moderate proximal LAD stenosis just before takeoff of large diagonal branch.  DFR equals 0.94.  No intervention performed.  2.  Normal left circumflex  3.  Large dominant right coronary artery with focal 90% stenosis in mid segment.  Successfully stented with 4.0 x 20 Synergy  "drug-eluting stent, 0% residual stenosis with KYLE-3 flow.  4.  Plavix loaded 600 mg.  Plan 75 mg/day x 12 months  5.  Intensify lipid therapy; stop Pravachol and start Crestor 20 mg/day.        Physical Examination Review of Systems   BP (!) 154/88 (BP Location: Left arm, Patient Position: Sitting, Cuff Size: Adult Regular)   Pulse 72   Resp 18   Ht 1.778 m (5' 10\")   SpO2 94%   BMI 37.02 kg/m    Body mass index is 37.02 kg/m .  Wt Readings from Last 3 Encounters:   02/12/24 117 kg (258 lb)   01/17/24 116.3 kg (256 lb 8 oz)   12/18/23 113.4 kg (250 lb)     General Appearance:   no distress, normal body habitus   ENT/Mouth: membranes moist, no oral lesions or bleeding gums.      EYES:  no scleral icterus, normal conjunctivae   Neck: no carotid bruits or thyromegaly   Chest/Lungs:   lungs are clear to auscultation, no rales or wheezing, equal chest wall expansion    Cardiovascular:   Regular. Normal first and second heart sounds with no murmurs, rubs, or gallops; the carotid, radial and posterior tibial pulses are intact, absent edema bilaterally        Extremities  Puncture Site: no cyanosis or clubbing  Right radial site is soft with minimal bruising.  Radial pulses and Pedal pulses intact and symmetrical.  CMS intact.   Skin: no xanthelasma, warm.    Neurologic: no tremors     Psychiatric: alert and oriented x3, calm                                                        Negative unless noted in HPI     Medical History  Surgical History Family History Social History   Past Medical History:   Diagnosis Date    GALINDO (acute kidney injury) (H24)     BPH (benign prostatic hyperplasia)     Clostridium difficile colitis     h/o 10/19    Diverticulitis     GERD (gastroesophageal reflux disease)     H/O Bell's palsy     Hematuria     Hyperbilirubinemia     Hyperlipidemia     Hypertension     Insomnia     Lower back pain     Right inguinal hernia     Sleep apnea     Minor Sleep Apnea not CPAP    Past Surgical History: "   Procedure Laterality Date    CV CORONARY ANGIOGRAM N/A 2024    Procedure: Coronary Angiogram;  Surgeon: Merrick Hoyos MD;  Location: [facility] CV    CV INSTANTANEOUS WAVE-FREE RATIO N/A 2024    Procedure: Instantaneous Wave-Free Ratio;  Surgeon: Merrick Hoyos MD;  Location: [facility]    CV LEFT HEART CATH N/A 2024    Procedure: Left Heart Catheterization;  Surgeon: Merrick Hoyos MD;  Location: [facility]    CV PCI STENT DRUG ELUTING N/A 2024    Procedure: Percutaneous Coronary Intervention Stent;  Surgeon: Merrick Hoyos MD;  Location: [facility]    HC KNEE SCOPE,MED/LAT MENISCUS REPAIR      Description: Knee Arthroscopy With Medial Meniscus Repair;  Recorded: 2009;    HERNIA REPAIR      HI ESOPHAGOGASTRODUODENOSCOPY TRANSORAL DIAGNOSTIC N/A 2019    Procedure: ESOPHAGOGASTRODUODENOSCOPY (EGD) with biopsies;  Surgeon: Tamir Serna MD;  Location: [facility];  Service: Gastroenterology    HI LAP,SURG,COLECTOMY, PARTIAL, W/ANAST N/A 2019    Procedure: LAPAROSCOPIC sIGMOIDECTOMY;  Surgeon: Isi Lyles MD;  Location: [facility] OR;  Service: General    SIGMOIDOSCOPY FLEXIBLE N/A 2019    Procedure: Flexible SIGMOIDOSCOPY;  Surgeon: Isi Lyles MD;  Location: [facility];  Service: Gastroenterology    SIGMOIDOSCOPY FLEXIBLE N/A 2019    Procedure: Flexible SIGMOIDOSCOPY with biopsy;  Surgeon: Isi Lyles MD;  Location: [facility];  Service: Gastroenterology    No family history on file. Social History     Socioeconomic History    Marital status:      Spouse name: Not on file    Number of children: Not on file    Years of education: Not on file    Highest education level: Not on file   Occupational History    Not on file   Tobacco Use    Smoking status: Former     Types: Cigarettes     Quit date: 1990     Years since quittin.1    Smokeless tobacco: Former      Types: Chew    Tobacco comments:     chew daily   Substance and Sexual Activity    Alcohol use: Yes     Comment: Alcoholic Drinks/day: occasional    Drug use: Yes     Comment: Drug use: medical cannabis    Sexual activity: Yes     Partners: Female   Other Topics Concern    Not on file   Social History Narrative    Not on file     Social Determinants of Health     Financial Resource Strain: High Risk (10/14/2023)    Financial Resource Strain     Within the past 12 months, have you or your family members you live with been unable to get utilities (heat, electricity) when it was really needed?: Yes   Food Insecurity: Low Risk  (10/14/2023)    Food Insecurity     Within the past 12 months, did you worry that your food would run out before you got money to buy more?: No     Within the past 12 months, did the food you bought just not last and you didn t have money to get more?: No   Transportation Needs: Low Risk  (10/14/2023)    Transportation Needs     Within the past 12 months, has lack of transportation kept you from medical appointments, getting your medicines, non-medical meetings or appointments, work, or from getting things that you need?: No   Physical Activity: Not on file   Stress: Not on file   Social Connections: Not on file   Interpersonal Safety: Low Risk  (10/17/2023)    Interpersonal Safety     Do you feel physically and emotionally safe where you currently live?: Yes     Within the past 12 months, have you been hit, slapped, kicked or otherwise physically hurt by someone?: No     Within the past 12 months, have you been humiliated or emotionally abused in other ways by your partner or ex-partner?: No   Housing Stability: Low Risk  (10/14/2023)    Housing Stability     Do you have housing? : Yes     Are you worried about losing your housing?: No          Medications  Allergies   Current Outpatient Medications   Medication Sig Dispense Refill    amLODIPine (NORVASC) 5 MG tablet TAKE 1 TABLET(5 MG) BY MOUTH  DAILY 90 tablet 2    amoxicillin (AMOXIL) 500 MG tablet TAKE 4 TABLETS BY MOUTH 1 HOUR BEFORE DENTAL APPOINTMENT      aspirin 81 MG EC tablet Take 1 tablet (81 mg) by mouth daily Start tomorrow. 30 tablet 3    clopidogrel (PLAVIX) 75 MG tablet Take 1 tablet (75 mg) by mouth daily 90 tablet 3    fish oil-omega-3 fatty acids 1000 MG capsule Take 2 g by mouth daily      medical cannabis (Patient's own supply) by Other route See Admin Instructions      multivitamin (ONE A DAY) per tablet Take 1 tablet by mouth daily      pantoprazole (PROTONIX) 20 MG EC tablet TAKE 1 TABLET(20 MG) BY MOUTH DAILY 90 tablet 3    rosuvastatin (CRESTOR) 20 MG tablet Take 1 tablet (20 mg) by mouth daily 90 tablet 3    tamsulosin (FLOMAX) 0.4 MG capsule TAKE 2 CAPSULES(0.8 MG) BY MOUTH DAILY 180 capsule 3    triamcinolone (KENALOG) 0.1 % external ointment APPLY TO AFFECTED AREAS TWICE DAILY. TAPER WITH IMPROVEMENT      Allergies   Allergen Reactions    Atorvastatin Calcium [Atorvastatin] Dizziness         Lab Results    Chemistry/lipid CBC Cardiac Enzymes/BNP/TSH/INR   Lab Results   Component Value Date    CHOL 206 (H) 02/09/2024    HDL 53 02/09/2024    TRIG 170 (H) 02/09/2024    BUN 17.2 02/09/2024     02/09/2024    CO2 30 (H) 02/09/2024    Lab Results   Component Value Date    WBC 5.8 02/09/2024    HGB 16.9 02/09/2024    HCT 50.5 02/09/2024    MCV 89 02/09/2024     02/09/2024    Lab Results   Component Value Date    TROPONINI 0.01 02/20/2023    BNP 25 11/09/2020    TSH 2.02 11/09/2020    INR 1.12 (H) 01/28/2020            This note has been dictated using voice recognition software. Any grammatical, typographical, or context distortions are unintentional and inherent to the software    Su Karimi PA-C

## 2024-02-19 NOTE — PROCEDURE: SUTURE REMOVAL (GLOBAL PERIOD)
Detail Level: Detailed
Add 74405 Cpt? (Important Note: In 2017 The Use Of 74408 Is Being Tracked By Cms To Determine Future Global Period Reimbursement For Global Periods): no

## 2024-02-20 ENCOUNTER — HOSPITAL ENCOUNTER (OUTPATIENT)
Dept: CARDIAC REHAB | Facility: CLINIC | Age: 69
Discharge: HOME | End: 2024-02-20
Attending: INTERNAL MEDICINE
Payer: COMMERCIAL

## 2024-02-20 ENCOUNTER — OFFICE VISIT (OUTPATIENT)
Dept: CARDIOLOGY | Facility: CLINIC | Age: 69
End: 2024-02-20
Payer: COMMERCIAL

## 2024-02-20 VITALS
HEIGHT: 70 IN | SYSTOLIC BLOOD PRESSURE: 154 MMHG | RESPIRATION RATE: 18 BRPM | OXYGEN SATURATION: 94 % | HEART RATE: 72 BPM | BODY MASS INDEX: 37.02 KG/M2 | DIASTOLIC BLOOD PRESSURE: 88 MMHG

## 2024-02-20 DIAGNOSIS — I10 ESSENTIAL HYPERTENSION, BENIGN: ICD-10-CM

## 2024-02-20 DIAGNOSIS — E78.5 HYPERLIPIDEMIA LDL GOAL <70: Primary | ICD-10-CM

## 2024-02-20 DIAGNOSIS — E66.01 MORBID OBESITY (H): ICD-10-CM

## 2024-02-20 PROCEDURE — 93798 PHYS/QHP OP CAR RHAB W/ECG: CPT

## 2024-02-20 PROCEDURE — 99214 OFFICE O/P EST MOD 30 MIN: CPT

## 2024-02-20 RX ORDER — AMLODIPINE BESYLATE 10 MG/1
10 TABLET ORAL DAILY
Qty: 90 TABLET | Refills: 3 | Status: SHIPPED | OUTPATIENT
Start: 2024-02-20 | End: 2024-04-22

## 2024-02-20 NOTE — PATIENT INSTRUCTIONS
Jay Roth,    It was a pleasure to see you today at the [facility].     My recommendations after this visit include:    - Increase amlodipine to 10 mg once daily. (Two 5 mg pills). Monitor blood pressure with goal of ~130/80  - Can increase pantoprazole to 40 mg daily as needed     - Please seek medical attention if you develop recurrent chest pain or shortness of breath or similar symptoms you experienced prior to recent cardiac event    - Please get your lipid level test and liver enzymes (AST/ALT) in 6 weeks Make sure to fast for 8-12 hours prior to lab work. Okay to have plain water, black coffee or tea without creamer and sugar    - Cardiac rehab as scheduled    - Follow up with Dr. Prajapati in 4/4/2024    - Please call [phone], if you have any questions or concerns    Su Karimi PA-C    Medication     Take all your medications as prescribed  Do not stop any medications without talking with a healthcare provider    Exercise      Physical activity is important for overall health  Set a goal of 150 minutes of exercise each week  For example, 30 minutes of exercise 5 days each week.    These 30 minutes can be broken into shorter periods of 15 minutes twice daily or 10 minutes three times daily  Start any exercise program slowly and work towards the goal of 150 minutes each week  For example, you may start with 10 minutes and plan to add a few minutes each week as you get stronger   Examples of exercise include walking, swimming, or biking  Remember to stretch and stay hydrated with exercise    Diet     A heart healthy diet includes:  A variety of fruits and vegetables  Whole grains  Low-fat dairy (fat-free, 1% fat, and low-fat)  Lean meats and poultry without skin   Fish (eat fish 2 times each week)  Nuts  Limit saturated fat to about 13 grams each day (based on a 2000 calorie diet)  Limit red meat  Limit sugars (sweets and sugary beverages)  Limit your portion sizes  Do  not add salt to your food when cooking or at the table  Limit alcohol intake (no more than 1 drink each day for women or 2 drinks each day for men)    Weight Loss     Work on losing weight with diet and exercise  You BMI (body mass index) should be between 18.5-24.9  This is a calculation of your weight and height  Please ask your healthcare provider for your BMI    Manage Other Chronic Health Conditions     Control cholesterol  Eat a diet low in saturated fat  Exercise   Take a statin medication as prescribed  Manage blood pressure  Eat a diet low in sodium  Exercise  Reduce stress  Lose weight   Take blood pressure medications as prescribed  Control blood sugars if diabetic  Monitor sugars and carbohydrates in your diet  Lose weight   Take diabetes medications as prescribed  Follow-up with your primary care provider to make sure your blood sugars are well controlled    Stress Reduction     Find time each day to relax  Reading, listening to music, yoga, meditation, exercise, spending time with friends and family, volunteering   Get 6-8 hours of sleep each night    Smoking Cessation     Smoking causes numerous health problems including coronary artery disease  It is never too late to quit  Set realistic goals for quitting  Decrease the number of cigarettes used each week  Use nicotine gum or patches to help you quit    Information from the [employer].  Please visit their website at www.heart.org

## 2024-02-20 NOTE — LETTER
2/20/2024    Aravind Villa MD  [address]  [facility] [address]    RE: Jay Roth       Dear Colleague,     I had the pleasure of seeing Jay Roth in the [facility].          Assessment/Recommendations   Assessment:    1.  Coronary artery disease: S/p PCI of mid RCA 90% stenosis with KATERINA x 1.  Moderate proximal LAD 50% stenosis is not flow-limiting.  Discussed future monitoring of moderate disease with patient.  - On dual antiplatelet therapy with ASA 81 mg indefinitely and Clopidogrel (Plavix) 75 mg daily for 12 months.  - Patient reports significant improvement in chest discomfort following RCA stent.  Continues to have a near constant 1/2 out of 10 chest discomfort in the left chest.  Differs from previous chest pain in severity, does not worsen with exertion.  Has tolerated exercise at cardiac rehab without symptoms.  - Cardiac rehab has been scheduled  - Reviewed most recent BMP, Hgb, platelet- stable.    2.  Dyslipidemia with LDL goal <70/Obesity: Jay Roth is on high intensity statin therapy with rosuvastatin 20 mg daily. Most recent LDL is 119.      3.  Hypertension: His blood pressure is elevated, resting BP also elevated at cardiac rehab today. Currently on amlodipine 5 mg daily.      Plan:  - We discussed the importance of antiplatelet therapy and talking with his cardiologist prior to stopping these medications for any reason.    - Encouraged to seek medical attention if recurrent chest pain or shortness of breath.    - Increase amlodipine to 10 mg, would also benefit anginal chest pain although I feel patient's atypical chest discomfort is likely cardiac related following stent placement.  Discussed increasing exercise, low-sodium diet, limited alcohol use.     - Discussed increasing pantoprazole to 40 mg daily if he feels reflux continues to be under control    - Continue hyperlipidemia regimen. Fasting lipid profile check in 4-8 weeks. Order placed    -  Cardiac rehab as scheduled    - We discussed a diet low in saturated fat, weight loss, and exercise along with medication for better control of cholesterol.  Highly encouraged to participate in nutrition class in cardiac rehab.  - Risk factor modification and lifestyle management topics were discussed including managing comorbidities, weight loss, heart healthy diet, exercise, and alcohol use.        Follow up with Dr. Prajapati 4/4/2024     History of Present Illness/Subjective    Mr. Jay Roth is a 68 year old male with a past medical history of CAD/PCI, HLD, HTN who is seen at [facility] for post coronary intervention follow up. S/p PCI of mid RCA 90% stenosis with DESx1.  Moderate proximal LAD stenosis is not flow-limiting.    Patient reports improved exertional chest pain following stent placement. Continues to have low-level, mild near constant chest discomfort 1-2/10 since stent placement.  Discomfort is not worsened with exertion and he tolerated cardiac rehab without issue.  He denies missed doses of aspirin or Plavix.  The week following procedure he was experiencing dry heaving, nausea, diarrhea and symptoms resolved about 2 days ago.  He is having mild reflux symptoms on pantoprazole 20 mg which were better controlled with omeprazole.      Patient's blood pressure is elevated today and was so high cardiac rehab as well.  He says he did tolerate increased dose of amlodipine in the past.  Does not monitor frequently at home.  Patient plans to join weight watchers program and start more consistent walking/exercise with his wife.  He has some intermittent lower extremity swelling which seems to worsen with a salty meal and consumption of beer.    He denies fatigue, lightheadedness, shortness of breath, dyspnea on exertion, orthopnea, and palpitations.       Coronary Angiogram 2/12/2024 reviewed:  1.  Moderate proximal LAD stenosis just before takeoff of large diagonal  "branch.  DFR equals 0.94.  No intervention performed.  2.  Normal left circumflex  3.  Large dominant right coronary artery with focal 90% stenosis in mid segment.  Successfully stented with 4.0 x 20 Synergy drug-eluting stent, 0% residual stenosis with KYLE-3 flow.  4.  Plavix loaded 600 mg.  Plan 75 mg/day x 12 months  5.  Intensify lipid therapy; stop Pravachol and start Crestor 20 mg/day.        Physical Examination Review of Systems   BP (!) 154/88 (BP Location: Left arm, Patient Position: Sitting, Cuff Size: Adult Regular)   Pulse 72   Resp 18   Ht 1.778 m (5' 10\")   SpO2 94%   BMI 37.02 kg/m    Body mass index is 37.02 kg/m .  Wt Readings from Last 3 Encounters:   02/12/24 117 kg (258 lb)   01/17/24 116.3 kg (256 lb 8 oz)   12/18/23 113.4 kg (250 lb)     General Appearance:   no distress, normal body habitus   ENT/Mouth: membranes moist, no oral lesions or bleeding gums.      EYES:  no scleral icterus, normal conjunctivae   Neck: no carotid bruits or thyromegaly   Chest/Lungs:   lungs are clear to auscultation, no rales or wheezing, equal chest wall expansion    Cardiovascular:   Regular. Normal first and second heart sounds with no murmurs, rubs, or gallops; the carotid, radial and posterior tibial pulses are intact, absent edema bilaterally        Extremities  Puncture Site: no cyanosis or clubbing  Right radial site is soft with minimal bruising.  Radial pulses and Pedal pulses intact and symmetrical.  CMS intact.   Skin: no xanthelasma, warm.    Neurologic: no tremors     Psychiatric: alert and oriented x3, calm                                                        Negative unless noted in HPI     Medical History  Surgical History Family History Social History   Past Medical History:   Diagnosis Date    GALINDO (acute kidney injury) (H24)     BPH (benign prostatic hyperplasia)     Clostridium difficile colitis     h/o 10/19    Diverticulitis     GERD (gastroesophageal reflux disease)     H/O Jackson's " palsy     Hematuria     Hyperbilirubinemia     Hyperlipidemia     Hypertension     Insomnia     Lower back pain     Right inguinal hernia     Sleep apnea     Minor Sleep Apnea not CPAP    Past Surgical History:   Procedure Laterality Date    CV CORONARY ANGIOGRAM N/A 2/12/2024    Procedure: Coronary Angiogram;  Surgeon: Merrick Hoyos MD;  Location: [facility]    CV INSTANTANEOUS WAVE-FREE RATIO N/A 2/12/2024    Procedure: Instantaneous Wave-Free Ratio;  Surgeon: Merrick Hoyos MD;  Location: [facility]    CV LEFT HEART CATH N/A 2/12/2024    Procedure: Left Heart Catheterization;  Surgeon: Merrick Hoyos MD;  Location: [facility]    CV PCI STENT DRUG ELUTING N/A 2/12/2024    Procedure: Percutaneous Coronary Intervention Stent;  Surgeon: Merrick Hoyos MD;  Location: [facility]    HC KNEE SCOPE,MED/LAT MENISCUS REPAIR      Description: Knee Arthroscopy With Medial Meniscus Repair;  Recorded: 02/11/2009;    HERNIA REPAIR      DE ESOPHAGOGASTRODUODENOSCOPY TRANSORAL DIAGNOSTIC N/A 12/26/2019    Procedure: ESOPHAGOGASTRODUODENOSCOPY (EGD) with biopsies;  Surgeon: Tamir Serna MD;  Location: [facility];  Service: Gastroenterology    DE LAP,SURG,COLECTOMY, PARTIAL, W/ANAST N/A 12/5/2019    Procedure: LAPAROSCOPIC sIGMOIDECTOMY;  Surgeon: Isi Lyles MD;  Location: [facility] OR;  Service: General    SIGMOIDOSCOPY FLEXIBLE N/A 12/24/2019    Procedure: Flexible SIGMOIDOSCOPY;  Surgeon: Isi Lyles MD;  Location: [facility];  Service: Gastroenterology    SIGMOIDOSCOPY FLEXIBLE N/A 12/27/2019    Procedure: Flexible SIGMOIDOSCOPY with biopsy;  Surgeon: Isi Lyles MD;  Location: [facility];  Service: Gastroenterology    No family history on file. Social History     Socioeconomic History    Marital status:      Spouse name: Not on file    Number of children: Not on file    Years of education: Not on file    Highest  education level: Not on file   Occupational History    Not on file   Tobacco Use    Smoking status: Former     Types: Cigarettes     Quit date: 1990     Years since quittin.1    Smokeless tobacco: Former     Types: Chew    Tobacco comments:     chew daily   Substance and Sexual Activity    Alcohol use: Yes     Comment: Alcoholic Drinks/day: occasional    Drug use: Yes     Comment: Drug use: medical cannabis    Sexual activity: Yes     Partners: Female   Other Topics Concern    Not on file   Social History Narrative    Not on file     Social Determinants of Health     Financial Resource Strain: High Risk (10/14/2023)    Financial Resource Strain     Within the past 12 months, have you or your family members you live with been unable to get utilities (heat, electricity) when it was really needed?: Yes   Food Insecurity: Low Risk  (10/14/2023)    Food Insecurity     Within the past 12 months, did you worry that your food would run out before you got money to buy more?: No     Within the past 12 months, did the food you bought just not last and you didn t have money to get more?: No   Transportation Needs: Low Risk  (10/14/2023)    Transportation Needs     Within the past 12 months, has lack of transportation kept you from medical appointments, getting your medicines, non-medical meetings or appointments, work, or from getting things that you need?: No   Physical Activity: Not on file   Stress: Not on file   Social Connections: Not on file   Interpersonal Safety: Low Risk  (10/17/2023)    Interpersonal Safety     Do you feel physically and emotionally safe where you currently live?: Yes     Within the past 12 months, have you been hit, slapped, kicked or otherwise physically hurt by someone?: No     Within the past 12 months, have you been humiliated or emotionally abused in other ways by your partner or ex-partner?: No   Housing Stability: Low Risk  (10/14/2023)    Housing Stability     Do you have housing? :  Yes     Are you worried about losing your housing?: No          Medications  Allergies   Current Outpatient Medications   Medication Sig Dispense Refill    amLODIPine (NORVASC) 5 MG tablet TAKE 1 TABLET(5 MG) BY MOUTH DAILY 90 tablet 2    amoxicillin (AMOXIL) 500 MG tablet TAKE 4 TABLETS BY MOUTH 1 HOUR BEFORE DENTAL APPOINTMENT      aspirin 81 MG EC tablet Take 1 tablet (81 mg) by mouth daily Start tomorrow. 30 tablet 3    clopidogrel (PLAVIX) 75 MG tablet Take 1 tablet (75 mg) by mouth daily 90 tablet 3    fish oil-omega-3 fatty acids 1000 MG capsule Take 2 g by mouth daily      medical cannabis (Patient's own supply) by Other route See Admin Instructions      multivitamin (ONE A DAY) per tablet Take 1 tablet by mouth daily      pantoprazole (PROTONIX) 20 MG EC tablet TAKE 1 TABLET(20 MG) BY MOUTH DAILY 90 tablet 3    rosuvastatin (CRESTOR) 20 MG tablet Take 1 tablet (20 mg) by mouth daily 90 tablet 3    tamsulosin (FLOMAX) 0.4 MG capsule TAKE 2 CAPSULES(0.8 MG) BY MOUTH DAILY 180 capsule 3    triamcinolone (KENALOG) 0.1 % external ointment APPLY TO AFFECTED AREAS TWICE DAILY. TAPER WITH IMPROVEMENT      Allergies   Allergen Reactions    Atorvastatin Calcium [Atorvastatin] Dizziness         Lab Results    Chemistry/lipid CBC Cardiac Enzymes/BNP/TSH/INR   Lab Results   Component Value Date    CHOL 206 (H) 02/09/2024    HDL 53 02/09/2024    TRIG 170 (H) 02/09/2024    BUN 17.2 02/09/2024     02/09/2024    CO2 30 (H) 02/09/2024    Lab Results   Component Value Date    WBC 5.8 02/09/2024    HGB 16.9 02/09/2024    HCT 50.5 02/09/2024    MCV 89 02/09/2024     02/09/2024    Lab Results   Component Value Date    TROPONINI 0.01 02/20/2023    BNP 25 11/09/2020    TSH 2.02 11/09/2020    INR 1.12 (H) 01/28/2020            This note has been dictated using voice recognition software. Any grammatical, typographical, or context distortions are unintentional and inherent to the software    Su Karimi,  JONY      Thank you for allowing me to participate in the care of your patient.      Sincerely,     Su Tabares PA-C     [facility]  cc:   No referring provider defined for this encounter.

## 2024-02-23 ENCOUNTER — HOSPITAL ENCOUNTER (OUTPATIENT)
Dept: CARDIAC REHAB | Facility: CLINIC | Age: 69
Discharge: HOME | End: 2024-02-23
Attending: INTERNAL MEDICINE
Payer: COMMERCIAL

## 2024-02-23 PROCEDURE — 93798 PHYS/QHP OP CAR RHAB W/ECG: CPT

## 2024-02-27 ENCOUNTER — HOSPITAL ENCOUNTER (OUTPATIENT)
Dept: CARDIAC REHAB | Facility: CLINIC | Age: 69
Discharge: HOME | End: 2024-02-27
Attending: INTERNAL MEDICINE
Payer: COMMERCIAL

## 2024-02-27 PROCEDURE — 93798 PHYS/QHP OP CAR RHAB W/ECG: CPT

## 2024-02-27 PROCEDURE — 94625 PHY/QHP OP PULM RHB W/O MNTR: CPT

## 2024-03-01 ENCOUNTER — HOSPITAL ENCOUNTER (OUTPATIENT)
Dept: CARDIAC REHAB | Facility: CLINIC | Age: 69
Discharge: HOME | End: 2024-03-01
Attending: INTERNAL MEDICINE
Payer: COMMERCIAL

## 2024-03-01 PROCEDURE — 93798 PHYS/QHP OP CAR RHAB W/ECG: CPT

## 2024-03-05 ENCOUNTER — HOSPITAL ENCOUNTER (OUTPATIENT)
Dept: CARDIAC REHAB | Facility: CLINIC | Age: 69
Discharge: HOME | End: 2024-03-05
Attending: INTERNAL MEDICINE
Payer: COMMERCIAL

## 2024-03-05 PROCEDURE — 93798 PHYS/QHP OP CAR RHAB W/ECG: CPT

## 2024-03-08 ENCOUNTER — HOSPITAL ENCOUNTER (OUTPATIENT)
Dept: CARDIAC REHAB | Facility: CLINIC | Age: 69
Discharge: HOME | End: 2024-03-08
Attending: INTERNAL MEDICINE
Payer: COMMERCIAL

## 2024-03-08 PROCEDURE — 93798 PHYS/QHP OP CAR RHAB W/ECG: CPT

## 2024-03-12 ENCOUNTER — HOSPITAL ENCOUNTER (OUTPATIENT)
Dept: CARDIAC REHAB | Facility: CLINIC | Age: 69
Discharge: HOME | End: 2024-03-12
Attending: INTERNAL MEDICINE
Payer: COMMERCIAL

## 2024-03-12 PROCEDURE — 93798 PHYS/QHP OP CAR RHAB W/ECG: CPT

## 2024-03-15 ENCOUNTER — HOSPITAL ENCOUNTER (OUTPATIENT)
Dept: CARDIAC REHAB | Facility: CLINIC | Age: 69
Discharge: HOME | End: 2024-03-15
Attending: INTERNAL MEDICINE
Payer: COMMERCIAL

## 2024-03-15 PROCEDURE — 93798 PHYS/QHP OP CAR RHAB W/ECG: CPT

## 2024-03-22 ENCOUNTER — HOSPITAL ENCOUNTER (OUTPATIENT)
Dept: CARDIAC REHAB | Facility: CLINIC | Age: 69
Discharge: HOME | End: 2024-03-22
Attending: INTERNAL MEDICINE
Payer: COMMERCIAL

## 2024-03-22 PROCEDURE — 93798 PHYS/QHP OP CAR RHAB W/ECG: CPT

## 2024-03-29 ENCOUNTER — HOSPITAL ENCOUNTER (OUTPATIENT)
Dept: CARDIAC REHAB | Facility: CLINIC | Age: 69
Discharge: HOME | End: 2024-03-29
Attending: INTERNAL MEDICINE
Payer: COMMERCIAL

## 2024-03-29 PROCEDURE — 93798 PHYS/QHP OP CAR RHAB W/ECG: CPT | Performed by: OCCUPATIONAL THERAPIST

## 2024-04-02 ENCOUNTER — LAB (OUTPATIENT)
Dept: CARDIOLOGY | Facility: CLINIC | Age: 69
End: 2024-04-02
Payer: COMMERCIAL

## 2024-04-02 ENCOUNTER — HOSPITAL ENCOUNTER (OUTPATIENT)
Dept: CARDIAC REHAB | Facility: CLINIC | Age: 69
Discharge: HOME | End: 2024-04-02
Attending: INTERNAL MEDICINE
Payer: COMMERCIAL

## 2024-04-02 DIAGNOSIS — E78.5 HYPERLIPIDEMIA LDL GOAL <70: ICD-10-CM

## 2024-04-02 LAB
ALT SERPL W P-5'-P-CCNC: 18 U/L (ref 0–70)
AST SERPL W P-5'-P-CCNC: 23 U/L (ref 0–45)
CHOLEST SERPL-MCNC: 158 MG/DL
FASTING STATUS PATIENT QL REPORTED: YES
HDLC SERPL-MCNC: 42 MG/DL
LDLC SERPL CALC-MCNC: 54 MG/DL
NONHDLC SERPL-MCNC: 116 MG/DL
TRIGL SERPL-MCNC: 309 MG/DL

## 2024-04-02 PROCEDURE — 80061 LIPID PANEL: CPT

## 2024-04-02 PROCEDURE — 84460 ALANINE AMINO (ALT) (SGPT): CPT

## 2024-04-02 PROCEDURE — 93798 PHYS/QHP OP CAR RHAB W/ECG: CPT

## 2024-04-02 PROCEDURE — 36415 COLL VENOUS BLD VENIPUNCTURE: CPT

## 2024-04-02 PROCEDURE — 84450 TRANSFERASE (AST) (SGOT): CPT

## 2024-04-04 ENCOUNTER — OFFICE VISIT (OUTPATIENT)
Dept: CARDIOLOGY | Facility: CLINIC | Age: 69
End: 2024-04-04
Payer: COMMERCIAL

## 2024-04-04 VITALS
HEIGHT: 70 IN | HEART RATE: 84 BPM | DIASTOLIC BLOOD PRESSURE: 78 MMHG | RESPIRATION RATE: 16 BRPM | BODY MASS INDEX: 35.78 KG/M2 | WEIGHT: 249.9 LBS | SYSTOLIC BLOOD PRESSURE: 144 MMHG

## 2024-04-04 DIAGNOSIS — K21.00 GASTROESOPHAGEAL REFLUX DISEASE WITH ESOPHAGITIS WITHOUT HEMORRHAGE: ICD-10-CM

## 2024-04-04 DIAGNOSIS — I25.118 CORONARY ARTERY DISEASE OF NATIVE ARTERY OF NATIVE HEART WITH STABLE ANGINA PECTORIS (H): Primary | ICD-10-CM

## 2024-04-04 DIAGNOSIS — I10 PRIMARY HYPERTENSION: ICD-10-CM

## 2024-04-04 DIAGNOSIS — Z95.5 S/P CORONARY ARTERY STENT PLACEMENT: ICD-10-CM

## 2024-04-04 DIAGNOSIS — R10.12 ABDOMINAL PAIN, LEFT UPPER QUADRANT: ICD-10-CM

## 2024-04-04 DIAGNOSIS — E78.2 MIXED HYPERLIPIDEMIA: ICD-10-CM

## 2024-04-04 PROBLEM — N17.9 ACUTE KIDNEY INJURY (H): Status: RESOLVED | Noted: 2019-12-20 | Resolved: 2024-04-04

## 2024-04-04 PROBLEM — N17.9 AKI (ACUTE KIDNEY INJURY) (H): Status: RESOLVED | Noted: 2019-12-20 | Resolved: 2024-04-04

## 2024-04-04 PROBLEM — R07.9 CHEST PAIN: Status: RESOLVED | Noted: 2024-02-12 | Resolved: 2024-04-04

## 2024-04-04 PROCEDURE — 99215 OFFICE O/P EST HI 40 MIN: CPT | Performed by: INTERNAL MEDICINE

## 2024-04-04 RX ORDER — PANTOPRAZOLE SODIUM 40 MG/1
40 TABLET, DELAYED RELEASE ORAL 2 TIMES DAILY
Qty: 60 TABLET | Refills: 1 | Status: SHIPPED | OUTPATIENT
Start: 2024-04-04 | End: 2024-06-01

## 2024-04-04 NOTE — PROGRESS NOTES
HEART CARE ENCOUNTER CONSULTATON [facility]      [facility]  [phone]      Assessment/Recommendations   Assessment:   Coronary artery disease: S/p PCI of mid RCA 90% stenosis with KATERINA x 1 (2/2024).  Moderate proximal LAD 50% stenosis is not flow-limiting by invasive flow wire.    Hypertension  3.  Hyperlipidemia LDL, significant improvement LDL now 54.n TG remains elevated.   4.  Moderate coronary calcification on CT scan  5.  Obesity, BMI 37  6.  History of GERD.   7.  Obesity  8.  Hiatal hernia, sliding-type (prior CT abdomen noted)  9.  History of duodenitis    Plan:   1.  Continue aspirin and Plavix  2.  Continue amlodipine 10 mg daily for hypertension  3.  Continue Crestor at 20 mg daily.  LDL goal  4.  Increase Protonix to 40 mg twice daily.  Ongoing upper left quadrant pain possibly secondary to GI cause.  Has a history of significant GERD.  Will increase Protonix to see if this helps.  Will discuss with primary care as he may need referral to GI to further evaluate.  Has history of hiatal hernia and duodenitis on prior EGD.       History of Present Illness/Subjective    HPI: Jay Roth is a 68 year old male hypertension, hyperlipidemia who presents to cardiology cardiology clinic in follow-up for coronary disease.    Patient recently underwent coronary angiogram which demonstrated moderate proximal LAD stenosis and severe stenosis of the mid RCA.  He underwent percutaneous coronary mention of his right coronary artery.    During follow-up with Nabila Gutierrez he had ongoing chest pain and elevation in blood pressure.  Chest pain was significantly improved after revascularization.  Amlodipine was increased to 10 mg daily.        Coronary Angiogram: 2/12/2024  1.  Moderate proximal LAD stenosis just before takeoff of large diagonal branch.  DFR equals 0.94.  No intervention performed.  2.  Normal left circumflex  3.  Large dominant right coronary artery with focal 90% stenosis in mid  segment.  Successfully stented with 4.0 x 20 Synergy drug-eluting stent, 0% residual stenosis with KYLE-3 flow.  4.  Plavix loaded 600 mg.  Plan 75 mg/day x 12 months  5.  Intensify lipid therapy; stop Pravachol and start Crestor 20 mg/day.       Medical History  Surgical History Family History Social History   Past Medical History:   Diagnosis Date    GALINDO (acute kidney injury) (H24)     BPH (benign prostatic hyperplasia)     Clostridium difficile colitis     h/o 10/19    Diverticulitis     GERD (gastroesophageal reflux disease)     H/O Bell's palsy     Hematuria     Hyperbilirubinemia     Hyperlipidemia     Hypertension     Insomnia     Lower back pain     Right inguinal hernia     Sleep apnea     Minor Sleep Apnea not CPAP     Past Surgical History:   Procedure Laterality Date    CV CORONARY ANGIOGRAM N/A 2/12/2024    Procedure: Coronary Angiogram;  Surgeon: Merrick Hoyos MD;  Location: [facility]    CV INSTANTANEOUS WAVE-FREE RATIO N/A 2/12/2024    Procedure: Instantaneous Wave-Free Ratio;  Surgeon: Merrick Hoyos MD;  Location: [facility]    CV LEFT HEART CATH N/A 2/12/2024    Procedure: Left Heart Catheterization;  Surgeon: Merrick Hoyos MD;  Location: [facility]    CV PCI STENT DRUG ELUTING N/A 2/12/2024    Procedure: Percutaneous Coronary Intervention Stent;  Surgeon: Merrick Hoyos MD;  Location: [facility]    HC KNEE SCOPE,MED/LAT MENISCUS REPAIR      Description: Knee Arthroscopy With Medial Meniscus Repair;  Recorded: 02/11/2009;    HERNIA REPAIR      WY ESOPHAGOGASTRODUODENOSCOPY TRANSORAL DIAGNOSTIC N/A 12/26/2019    Procedure: ESOPHAGOGASTRODUODENOSCOPY (EGD) with biopsies;  Surgeon: Tamir Serna MD;  Location: [facility];  Service: Gastroenterology    WY LAP,SURG,COLECTOMY, PARTIAL, W/ANAST N/A 12/5/2019    Procedure: LAPAROSCOPIC sIGMOIDECTOMY;  Surgeon: Isi Lyles MD;  Location: [facility] OR;  Service: General     SIGMOIDOSCOPY FLEXIBLE N/A 2019    Procedure: Flexible SIGMOIDOSCOPY;  Surgeon: Isi Lyles MD;  Location: [facility];  Service: Gastroenterology    SIGMOIDOSCOPY FLEXIBLE N/A 2019    Procedure: Flexible SIGMOIDOSCOPY with biopsy;  Surgeon: Isi Lyles MD;  Location: [facility];  Service: Gastroenterology     No family history on file.     Social History     Socioeconomic History    Marital status:      Spouse name: Not on file    Number of children: Not on file    Years of education: Not on file    Highest education level: Not on file   Occupational History    Not on file   Tobacco Use    Smoking status: Former     Types: Cigarettes     Quit date: 1990     Years since quittin.2    Smokeless tobacco: Former     Types: Chew    Tobacco comments:     chew daily   Substance and Sexual Activity    Alcohol use: Yes     Comment: Alcoholic Drinks/day: occasional    Drug use: Yes     Comment: Drug use: medical cannabis    Sexual activity: Yes     Partners: Female   Other Topics Concern    Not on file   Social History Narrative    Not on file     Social Determinants of Health     Financial Resource Strain: High Risk (10/14/2023)    Financial Resource Strain     Within the past 12 months, have you or your family members you live with been unable to get utilities (heat, electricity) when it was really needed?: Yes   Food Insecurity: Low Risk  (10/14/2023)    Food Insecurity     Within the past 12 months, did you worry that your food would run out before you got money to buy more?: No     Within the past 12 months, did the food you bought just not last and you didn t have money to get more?: No   Transportation Needs: Low Risk  (10/14/2023)    Transportation Needs     Within the past 12 months, has lack of transportation kept you from medical appointments, getting your medicines, non-medical meetings or appointments, work, or from getting things that you need?: No   Physical  Activity: Not on file   Stress: Not on file   Social Connections: Not on file   Interpersonal Safety: Low Risk  (10/17/2023)    Interpersonal Safety     Do you feel physically and emotionally safe where you currently live?: Yes     Within the past 12 months, have you been hit, slapped, kicked or otherwise physically hurt by someone?: No     Within the past 12 months, have you been humiliated or emotionally abused in other ways by your partner or ex-partner?: No   Housing Stability: Low Risk  (10/14/2023)    Housing Stability     Do you have housing? : Yes     Are you worried about losing your housing?: No           Medications  Allergies   Current Outpatient Medications   Medication Sig Dispense Refill    amLODIPine (NORVASC) 10 MG tablet Take 1 tablet (10 mg) by mouth daily 90 tablet 3    amoxicillin (AMOXIL) 500 MG tablet TAKE 4 TABLETS BY MOUTH 1 HOUR BEFORE DENTAL APPOINTMENT      aspirin 81 MG EC tablet Take 1 tablet (81 mg) by mouth daily Start tomorrow. 30 tablet 3    clopidogrel (PLAVIX) 75 MG tablet Take 1 tablet (75 mg) by mouth daily 90 tablet 3    fish oil-omega-3 fatty acids 1000 MG capsule Take 2 g by mouth daily      medical cannabis (Patient's own supply) by Other route See Admin Instructions      multivitamin (ONE A DAY) per tablet Take 1 tablet by mouth daily      pantoprazole (PROTONIX) 20 MG EC tablet TAKE 1 TABLET(20 MG) BY MOUTH DAILY 90 tablet 3    rosuvastatin (CRESTOR) 20 MG tablet Take 1 tablet (20 mg) by mouth daily 90 tablet 3    tamsulosin (FLOMAX) 0.4 MG capsule TAKE 2 CAPSULES(0.8 MG) BY MOUTH DAILY 180 capsule 3    triamcinolone (KENALOG) 0.1 % external ointment APPLY TO AFFECTED AREAS TWICE DAILY. TAPER WITH IMPROVEMENT         Allergies   Allergen Reactions    Atorvastatin Calcium [Atorvastatin] Dizziness          Lab Results    Chemistry/lipid CBC Cardiac Enzymes/BNP/TSH/INR   Recent Labs   Lab Test 10/17/23  0823   CHOL 206*   HDL 54   *   TRIG 195*     Recent Labs   Lab  Test 10/17/23  0823 10/14/22  0733 10/08/21  0802   * 103* 106     Recent Labs   Lab Test 12/15/23  1213      POTASSIUM 4.5   CHLORIDE 101   CO2 27   *   BUN 20.6   CR 1.07   GFRESTIMATED 77   JAYME 10.1     Recent Labs   Lab Test 12/15/23  1213 10/17/23  0823 02/20/23  1438   CR 1.07 1.11 1.05     Recent Labs   Lab Test 10/14/22  0733   A1C 5.0          Recent Labs   Lab Test 12/15/23  1213   WBC 6.1   HGB 16.9   HCT 49.5   MCV 86        Recent Labs   Lab Test 12/15/23  1213 02/20/23  1438 10/14/22  0733   HGB 16.9 18.0* 16.9    Recent Labs   Lab Test 02/20/23  1438 11/09/20  1702 11/09/20  1446   TROPONINI 0.01 <0.01 <0.01     Recent Labs   Lab Test 11/09/20  1446   BNP 25     Recent Labs   Lab Test 11/09/20  1702   TSH 2.02     Recent Labs   Lab Test 01/28/20  0612 01/24/20  0617 01/07/20  0657   INR 1.12* 1.09 1.09        Dino Prajapati DO      Today's clinic visit entailed:  53 minutes spent by me on the date of the encounter doing chart review, history and exam, documentation and further activities per the note  Provider  Link to MDM Help Grid

## 2024-04-04 NOTE — LETTER
4/4/2024    Aravind Villa MD  [address]  [facility] [address]    RE: Jay Roth       Dear Colleague,     I had the pleasure of seeing Jay Roth in the [facility].    HEART CARE ENCOUNTER CONSULTATON NOTE      [facility]  [phone]      Assessment/Recommendations   Assessment:   Coronary artery disease: S/p PCI of mid RCA 90% stenosis with KATERINA x 1 (2/2024).  Moderate proximal LAD 50% stenosis is not flow-limiting by invasive flow wire.    Hypertension  3.  Hyperlipidemia LDL, significant improvement LDL now 54.n TG remains elevated.   4.  Moderate coronary calcification on CT scan  5.  Obesity, BMI 37  6.  History of GERD.   7.  Obesity  8.  Hiatal hernia, sliding-type (prior CT abdomen noted)  9.  History of duodenitis    Plan:   1.  Continue aspirin and Plavix  2.  Continue amlodipine 10 mg daily for hypertension  3.  Continue Crestor at 20 mg daily.  LDL goal  4.  Increase Protonix to 40 mg twice daily.  Ongoing upper left quadrant pain possibly secondary to GI cause.  Has a history of significant GERD.  Will increase Protonix to see if this helps.  Will discuss with primary care as he may need referral to GI to further evaluate.  Has history of hiatal hernia and duodenitis on prior EGD.       History of Present Illness/Subjective    HPI: Jay Roth is a 68 year old male hypertension, hyperlipidemia who presents to cardiology cardiology clinic in follow-up for coronary disease.    Patient recently underwent coronary angiogram which demonstrated moderate proximal LAD stenosis and severe stenosis of the mid RCA.  He underwent percutaneous coronary mention of his right coronary artery.    During follow-up with Nabila Gutierrez he had ongoing chest pain and elevation in blood pressure.  Chest pain was significantly improved after revascularization.  Amlodipine was increased to 10 mg daily.        Coronary Angiogram: 2/12/2024  1.  Moderate proximal LAD  stenosis just before takeoff of large diagonal branch.  DFR equals 0.94.  No intervention performed.  2.  Normal left circumflex  3.  Large dominant right coronary artery with focal 90% stenosis in mid segment.  Successfully stented with 4.0 x 20 Synergy drug-eluting stent, 0% residual stenosis with KYLE-3 flow.  4.  Plavix loaded 600 mg.  Plan 75 mg/day x 12 months  5.  Intensify lipid therapy; stop Pravachol and start Crestor 20 mg/day.       Medical History  Surgical History Family History Social History   Past Medical History:   Diagnosis Date    GALINDO (acute kidney injury) (H24)     BPH (benign prostatic hyperplasia)     Clostridium difficile colitis     h/o 10/19    Diverticulitis     GERD (gastroesophageal reflux disease)     H/O Bell's palsy     Hematuria     Hyperbilirubinemia     Hyperlipidemia     Hypertension     Insomnia     Lower back pain     Right inguinal hernia     Sleep apnea     Minor Sleep Apnea not CPAP     Past Surgical History:   Procedure Laterality Date    CV CORONARY ANGIOGRAM N/A 2/12/2024    Procedure: Coronary Angiogram;  Surgeon: Merrick Hoyos MD;  Location: [facility]    CV INSTANTANEOUS WAVE-FREE RATIO N/A 2/12/2024    Procedure: Instantaneous Wave-Free Ratio;  Surgeon: Merrick Hoyos MD;  Location: [facility]    CV LEFT HEART CATH N/A 2/12/2024    Procedure: Left Heart Catheterization;  Surgeon: Merrick Hoyos MD;  Location: [facility]    CV PCI STENT DRUG ELUTING N/A 2/12/2024    Procedure: Percutaneous Coronary Intervention Stent;  Surgeon: Merrick Hoyos MD;  Location: [facility]    HC KNEE SCOPE,MED/LAT MENISCUS REPAIR      Description: Knee Arthroscopy With Medial Meniscus Repair;  Recorded: 02/11/2009;    HERNIA REPAIR      SD ESOPHAGOGASTRODUODENOSCOPY TRANSORAL DIAGNOSTIC N/A 12/26/2019    Procedure: ESOPHAGOGASTRODUODENOSCOPY (EGD) with biopsies;  Surgeon: Tamir Serna MD;  Location: [facility];  Service:  Gastroenterology    HI LAP,SURG,COLECTOMY, PARTIAL, W/ANAST N/A 2019    Procedure: LAPAROSCOPIC sIGMOIDECTOMY;  Surgeon: Isi Lyles MD;  Location: [facility] OR;  Service: General    SIGMOIDOSCOPY FLEXIBLE N/A 2019    Procedure: Flexible SIGMOIDOSCOPY;  Surgeon: Isi Lyles MD;  Location: [facility];  Service: Gastroenterology    SIGMOIDOSCOPY FLEXIBLE N/A 2019    Procedure: Flexible SIGMOIDOSCOPY with biopsy;  Surgeon: Isi Lyles MD;  Location: [facility];  Service: Gastroenterology     No family history on file.     Social History     Socioeconomic History    Marital status:      Spouse name: Not on file    Number of children: Not on file    Years of education: Not on file    Highest education level: Not on file   Occupational History    Not on file   Tobacco Use    Smoking status: Former     Types: Cigarettes     Quit date: 1990     Years since quittin.2    Smokeless tobacco: Former     Types: Chew    Tobacco comments:     chew daily   Substance and Sexual Activity    Alcohol use: Yes     Comment: Alcoholic Drinks/day: occasional    Drug use: Yes     Comment: Drug use: medical cannabis    Sexual activity: Yes     Partners: Female   Other Topics Concern    Not on file   Social History Narrative    Not on file     Social Determinants of Health     Financial Resource Strain: High Risk (10/14/2023)    Financial Resource Strain     Within the past 12 months, have you or your family members you live with been unable to get utilities (heat, electricity) when it was really needed?: Yes   Food Insecurity: Low Risk  (10/14/2023)    Food Insecurity     Within the past 12 months, did you worry that your food would run out before you got money to buy more?: No     Within the past 12 months, did the food you bought just not last and you didn t have money to get more?: No   Transportation Needs: Low Risk  (10/14/2023)    Transportation Needs     Within  the past 12 months, has lack of transportation kept you from medical appointments, getting your medicines, non-medical meetings or appointments, work, or from getting things that you need?: No   Physical Activity: Not on file   Stress: Not on file   Social Connections: Not on file   Interpersonal Safety: Low Risk  (10/17/2023)    Interpersonal Safety     Do you feel physically and emotionally safe where you currently live?: Yes     Within the past 12 months, have you been hit, slapped, kicked or otherwise physically hurt by someone?: No     Within the past 12 months, have you been humiliated or emotionally abused in other ways by your partner or ex-partner?: No   Housing Stability: Low Risk  (10/14/2023)    Housing Stability     Do you have housing? : Yes     Are you worried about losing your housing?: No           Medications  Allergies   Current Outpatient Medications   Medication Sig Dispense Refill    amLODIPine (NORVASC) 10 MG tablet Take 1 tablet (10 mg) by mouth daily 90 tablet 3    amoxicillin (AMOXIL) 500 MG tablet TAKE 4 TABLETS BY MOUTH 1 HOUR BEFORE DENTAL APPOINTMENT      aspirin 81 MG EC tablet Take 1 tablet (81 mg) by mouth daily Start tomorrow. 30 tablet 3    clopidogrel (PLAVIX) 75 MG tablet Take 1 tablet (75 mg) by mouth daily 90 tablet 3    fish oil-omega-3 fatty acids 1000 MG capsule Take 2 g by mouth daily      medical cannabis (Patient's own supply) by Other route See Admin Instructions      multivitamin (ONE A DAY) per tablet Take 1 tablet by mouth daily      pantoprazole (PROTONIX) 20 MG EC tablet TAKE 1 TABLET(20 MG) BY MOUTH DAILY 90 tablet 3    rosuvastatin (CRESTOR) 20 MG tablet Take 1 tablet (20 mg) by mouth daily 90 tablet 3    tamsulosin (FLOMAX) 0.4 MG capsule TAKE 2 CAPSULES(0.8 MG) BY MOUTH DAILY 180 capsule 3    triamcinolone (KENALOG) 0.1 % external ointment APPLY TO AFFECTED AREAS TWICE DAILY. TAPER WITH IMPROVEMENT         Allergies   Allergen Reactions    Atorvastatin Calcium  [Atorvastatin] Dizziness          Lab Results    Chemistry/lipid CBC Cardiac Enzymes/BNP/TSH/INR   Recent Labs   Lab Test 10/17/23  0823   CHOL 206*   HDL 54   *   TRIG 195*     Recent Labs   Lab Test 10/17/23  0823 10/14/22  0733 10/08/21  0802   * 103* 106     Recent Labs   Lab Test 12/15/23  1213      POTASSIUM 4.5   CHLORIDE 101   CO2 27   *   BUN 20.6   CR 1.07   GFRESTIMATED 77   JAYME 10.1     Recent Labs   Lab Test 12/15/23  1213 10/17/23  0823 02/20/23  1438   CR 1.07 1.11 1.05     Recent Labs   Lab Test 10/14/22  0733   A1C 5.0          Recent Labs   Lab Test 12/15/23  1213   WBC 6.1   HGB 16.9   HCT 49.5   MCV 86        Recent Labs   Lab Test 12/15/23  1213 02/20/23  1438 10/14/22  0733   HGB 16.9 18.0* 16.9    Recent Labs   Lab Test 02/20/23  1438 11/09/20  1702 11/09/20  1446   TROPONINI 0.01 <0.01 <0.01     Recent Labs   Lab Test 11/09/20  1446   BNP 25     Recent Labs   Lab Test 11/09/20  1702   TSH 2.02     Recent Labs   Lab Test 01/28/20  0612 01/24/20  0617 01/07/20  0657   INR 1.12* 1.09 1.09        Dino Prajapati DO      Today's clinic visit entailed:  53 minutes spent by me on the date of the encounter doing chart review, history and exam, documentation and further activities per the note  Provider  Link to [facility] Help Grid       Thank you for allowing me to participate in the care of your patient.      Sincerely,     Dino Prajapati DO     [facility]  cc:   Dino Prajapati DO  [address] [place] [address]

## 2024-04-08 ENCOUNTER — TELEPHONE (OUTPATIENT)
Dept: CARDIOLOGY | Facility: CLINIC | Age: 69
End: 2024-04-08
Payer: COMMERCIAL

## 2024-04-08 NOTE — TELEPHONE ENCOUNTER
[facility] Call Center    Phone Message    May a detailed message be left on voicemail: yes     Reason for Call: Medication Question or concern regarding medication   Prescription Clarification  Name of Medication: pantoprazole (PROTONIX) 40 MG EC tablet   Prescribing Provider: Dr. Prajapati   Pharmacy:    What on the order needs clarification? Pt would like to clarify dosage.      Action Taken: Other: Cardiology    Travel Screening: Not Applicable    Thank you!  [facility]

## 2024-04-08 NOTE — TELEPHONE ENCOUNTER
PC with patient and review of recommendation and prescription. Questioning to make sure of the prescription. Reviewed. Has upcoming OV with PCP next week. Will monitor and discuss at OV with PCP also, to note if improvement or not with increased dose. Verbalized understanding. NEVAEHRn

## 2024-04-17 ENCOUNTER — OFFICE VISIT (OUTPATIENT)
Dept: FAMILY MEDICINE | Facility: CLINIC | Age: 69
End: 2024-04-17
Payer: COMMERCIAL

## 2024-04-17 VITALS
HEIGHT: 70 IN | RESPIRATION RATE: 13 BRPM | OXYGEN SATURATION: 98 % | WEIGHT: 247 LBS | TEMPERATURE: 98.8 F | SYSTOLIC BLOOD PRESSURE: 138 MMHG | BODY MASS INDEX: 35.36 KG/M2 | HEART RATE: 81 BPM | DIASTOLIC BLOOD PRESSURE: 88 MMHG

## 2024-04-17 DIAGNOSIS — N13.8 BPH WITH URINARY OBSTRUCTION: ICD-10-CM

## 2024-04-17 DIAGNOSIS — I25.118 CORONARY ARTERY DISEASE OF NATIVE ARTERY OF NATIVE HEART WITH STABLE ANGINA PECTORIS (H): Primary | ICD-10-CM

## 2024-04-17 DIAGNOSIS — Z23 ENCOUNTER FOR IMMUNIZATION: ICD-10-CM

## 2024-04-17 DIAGNOSIS — Z95.5 S/P CORONARY ARTERY STENT PLACEMENT: ICD-10-CM

## 2024-04-17 DIAGNOSIS — I10 ESSENTIAL HYPERTENSION, BENIGN: ICD-10-CM

## 2024-04-17 DIAGNOSIS — E78.5 HYPERLIPIDEMIA LDL GOAL <100: ICD-10-CM

## 2024-04-17 DIAGNOSIS — N40.1 BPH WITH URINARY OBSTRUCTION: ICD-10-CM

## 2024-04-17 DIAGNOSIS — G47.33 OSA (OBSTRUCTIVE SLEEP APNEA): ICD-10-CM

## 2024-04-17 DIAGNOSIS — R10.12 LUQ ABDOMINAL PAIN: ICD-10-CM

## 2024-04-17 DIAGNOSIS — E66.01 MORBID OBESITY (H): ICD-10-CM

## 2024-04-17 DIAGNOSIS — D12.6 SERRATED ADENOMA OF COLON: ICD-10-CM

## 2024-04-17 LAB
ERYTHROCYTE [DISTWIDTH] IN BLOOD BY AUTOMATED COUNT: 13.2 % (ref 10–15)
HCT VFR BLD AUTO: 48.7 % (ref 40–53)
HGB BLD-MCNC: 16.6 G/DL (ref 13.3–17.7)
MCH RBC QN AUTO: 29.5 PG (ref 26.5–33)
MCHC RBC AUTO-ENTMCNC: 34.1 G/DL (ref 31.5–36.5)
MCV RBC AUTO: 87 FL (ref 78–100)
PLATELET # BLD AUTO: 177 10E3/UL (ref 150–450)
RBC # BLD AUTO: 5.62 10E6/UL (ref 4.4–5.9)
WBC # BLD AUTO: 5.2 10E3/UL (ref 4–11)

## 2024-04-17 PROCEDURE — 91320 SARSCV2 VAC 30MCG TRS-SUC IM: CPT | Performed by: FAMILY MEDICINE

## 2024-04-17 PROCEDURE — 85027 COMPLETE CBC AUTOMATED: CPT | Performed by: FAMILY MEDICINE

## 2024-04-17 PROCEDURE — 99214 OFFICE O/P EST MOD 30 MIN: CPT | Mod: 25 | Performed by: FAMILY MEDICINE

## 2024-04-17 PROCEDURE — 80061 LIPID PANEL: CPT | Performed by: FAMILY MEDICINE

## 2024-04-17 PROCEDURE — 90480 ADMN SARSCOV2 VAC 1/ONLY CMP: CPT | Performed by: FAMILY MEDICINE

## 2024-04-17 PROCEDURE — 36415 COLL VENOUS BLD VENIPUNCTURE: CPT | Performed by: FAMILY MEDICINE

## 2024-04-17 PROCEDURE — 83690 ASSAY OF LIPASE: CPT | Performed by: FAMILY MEDICINE

## 2024-04-17 PROCEDURE — 80053 COMPREHEN METABOLIC PANEL: CPT | Performed by: FAMILY MEDICINE

## 2024-04-17 ASSESSMENT — PATIENT HEALTH QUESTIONNAIRE - PHQ9
SUM OF ALL RESPONSES TO PHQ QUESTIONS 1-9: 3
10. IF YOU CHECKED OFF ANY PROBLEMS, HOW DIFFICULT HAVE THESE PROBLEMS MADE IT FOR YOU TO DO YOUR WORK, TAKE CARE OF THINGS AT HOME, OR GET ALONG WITH OTHER PEOPLE: NOT DIFFICULT AT ALL
SUM OF ALL RESPONSES TO PHQ QUESTIONS 1-9: 3

## 2024-04-17 ASSESSMENT — PAIN SCALES - GENERAL: PAINLEVEL: MILD PAIN (2)

## 2024-04-17 NOTE — PROGRESS NOTES
Assessment/Plan:    Coronary artery disease of native artery of native heart with stable angina pectoris (H24)  CAD historically status post right coronary artery stenting February 2024 as noted.  Doing well.  Saw Dr. Prajapati in follow-up April 4, 2024.  Continues dual antiplatelet therapy with low-dose aspirin and clopidogrel.    S/P coronary artery stent placement  As above.    Benign Essential Hypertension  Hypertension with ongoing management with amlodipine 10 mg daily.    Hyperlipidemia LDL goal <100  Hyperlipidemia with significant improvement after switching from pravastatin to rosuvastatin 20 mg daily.  Lipid cascade April 2, 2024 reviewed.  Has lost 18 pounds since weight max of 262 pounds on home scale through weight watchers program in the last 42 days and will continue attempts at weight goal less than 220 pounds initially, less than 200 pounds per patient goal.    BPH with urinary obstruction  BPH with urinary obstruction treated with tamsulosin 0.8 mg daily.    Obesity (BMI 35.0-39.9) with comorbidity (H)  Dietary and exercise modifications reviewed for weight goal less than 220 pounds ideally.  Patient would like to achieve weight goal less than 200 pounds.  Utilizing weight watchers.    Serrated adenoma of colon  History of colon polyps with colonoscopy June 8, 2023 with 5-year follow-up recommendation.    LUQ abdominal pain  Ongoing left upper quadrant abdominal pain will check lipase, CBC and comprehensive metabolic panel.  CT abdomen and pelvis.  Referral to see gastroenterologist.  History of described duodenitis and hiatal hernia noted.  Will defer to gastroenterologist regarding need for repeat upper endoscopy.  - Lipase  - CBC with platelets  - Comprehensive metabolic panel  - Adult GI [facility] Referral - Consult Only  - CT Abdomen Pelvis w/o & w Contrast    JIGNA (obstructive sleep apnea)  Patient has not had sleep study recently and will repeat sleep study ideally.  Prior difficulty  tolerating CPAP.  - Adult Sleep Eval & Management  Referral    Encounter for immunization  Updated COVID booster based on age criteria.  - COVID-19 12+ (2023-24) (PFIZER)    The longitudinal plan of care for the diagnosis(es)/condition(s) as documented were addressed during this visit. Due to the added complexity in care, I will continue to support Grzegorz in the subsequent management and with ongoing continuity of care.            Subjective:    Jay Roth is seen today for follow-up assessment.  Patient status post percutaneous intervention mid RCA secondary to 90% stenosis with drug-eluting stent x 1 completed February 2024.  Paroxysmal LAD 50% stenosis noted.  Aggressive cardiovascular risk factor reduction.  On dual antiplatelet therapy with low-dose aspirin and clopidogrel.  Had increase amlodipine from 5 mg up to 10 mg daily in the past for hypertension management.  Switch from pravastatin to rosuvastatin for cholesterol management.  Continues tamsulosin 0.8 mg daily for BPH with urinary obstruction.  JIGNA but did not tolerate CPAP well in the past.  Has lost 18 pounds through weight watchers since weight max of 262 pounds about 42 days ago.  Comprehensive review of systems as above otherwise all negative.      Assessment:   Coronary artery disease: S/p PCI of mid RCA 90% stenosis with KATERINA x 1 (2/2024).  Moderate proximal LAD 50% stenosis is not flow-limiting by invasive flow wire.    Hypertension  3.  Hyperlipidemia LDL, significant improvement LDL now 54.n TG remains elevated.   4.  Moderate coronary calcification on CT scan  5.  Obesity, BMI 37  6.  History of GERD.   7.  Obesity  8.  Hiatal hernia, sliding-type (prior CT abdomen noted)  9.  History of duodenitis     Plan:   1.  Continue aspirin and Plavix  2.  Continue amlodipine 10 mg daily for hypertension  3.  Continue Crestor at 20 mg daily.  LDL goal  4.  Increase Protonix to 40 mg twice daily.  Ongoing upper left quadrant pain possibly  "secondary to GI cause.  Has a history of significant GERD.  Will increase Protonix to see if this helps.  Will discuss with primary care as he may need referral to GI to further evaluate.  Has history of hiatal hernia and duodenitis on prior EGD.       - Lovely   3 children - 2 sons, 1 daughter   5 grandchildren   Quit smoking ~ 20 years   Quit 19 - prior chew 1 tin per 3 days   EtOH: occ   Mom -  84 kidney failure   Dad -  86 \"wore out\"   2 bros -   1 sis -   Surgeries: knee arthroscopy bilateral; left inguinal hernia; laparoscopic sigmoid colectomy 19; left TKA (Dr. Ramos with [facility]) on 22; /p PCI of mid RCA 90% stenosis with KATERINA x 1 (2024).  Moderate proximal LAD 50% stenosis is not flow-limiting by invasive flow wire;  Radiofrequency ablation procedure x 3 for lower back pain   Hospitalizations: 19 through 2020 (3 total admissions required) due to GI concerns   Work: retired as beer distributor (L1-L4 degenerative back issues) - oxycodone if walk, etc.   Hobbies: swim 5x/week, fishing;       Past Surgical History:   Procedure Laterality Date    CV CORONARY ANGIOGRAM N/A 2024    Procedure: Coronary Angiogram;  Surgeon: Merrick Hoyos MD;  Location: [facility] CV    CV INSTANTANEOUS WAVE-FREE RATIO N/A 2024    Procedure: Instantaneous Wave-Free Ratio;  Surgeon: Merrick Hoyos MD;  Location: [facility] CV    CV LEFT HEART CATH N/A 2024    Procedure: Left Heart Catheterization;  Surgeon: Merrick Hoyos MD;  Location: [facility] CV    CV PCI STENT DRUG ELUTING N/A 2024    Procedure: Percutaneous Coronary Intervention Stent;  Surgeon: Merrick Hoyos MD;  Location: [facility] LAB CV    HC KNEE SCOPE,MED/LAT MENISCUS REPAIR      Description: Knee Arthroscopy With Medial Meniscus Repair;  Recorded: 2009;    HERNIA REPAIR      NE ESOPHAGOGASTRODUODENOSCOPY TRANSORAL DIAGNOSTIC N/A 2019    Procedure: " ESOPHAGOGASTRODUODENOSCOPY (EGD) with biopsies;  Surgeon: Tamir Serna MD;  Location: [facility];  Service: Gastroenterology    ID LAP,SURG,COLECTOMY, PARTIAL, W/ANAST N/A 2019    Procedure: LAPAROSCOPIC sIGMOIDECTOMY;  Surgeon: Isi Lyles MD;  Location: [facility] OR;  Service: General    SIGMOIDOSCOPY FLEXIBLE N/A 2019    Procedure: Flexible SIGMOIDOSCOPY;  Surgeon: Isi Lyles MD;  Location: [facility];  Service: Gastroenterology    SIGMOIDOSCOPY FLEXIBLE N/A 2019    Procedure: Flexible SIGMOIDOSCOPY with biopsy;  Surgeon: Isi Lyles MD;  Location: [facility];  Service: Gastroenterology        No family history on file.     Past Medical History:   Diagnosis Date    GALINDO (acute kidney injury) (H24)     BPH (benign prostatic hyperplasia)     Clostridium difficile colitis     h/o 10/19    Diverticulitis     GERD (gastroesophageal reflux disease)     H/O Bell's palsy     Hematuria     Hyperbilirubinemia     Hyperlipidemia     Hypertension     Insomnia     Lower back pain     Right inguinal hernia     Sleep apnea     Minor Sleep Apnea not CPAP        Social History     Tobacco Use    Smoking status: Former     Current packs/day: 0.00     Types: Cigarettes     Quit date:      Years since quittin.3    Smokeless tobacco: Former     Types: Chew     Quit date:     Tobacco comments:     chew daily   Vaping Use    Vaping status: Never Used   Substance Use Topics    Alcohol use: Yes     Comment: Alcoholic Drinks/day: occasional    Drug use: Yes     Comment: Drug use: medical cannabis        Current Outpatient Medications   Medication Sig Dispense Refill    amLODIPine (NORVASC) 10 MG tablet Take 1 tablet (10 mg) by mouth daily (Patient taking differently: Take 20 mg by mouth daily) 90 tablet 3    amoxicillin (AMOXIL) 500 MG tablet TAKE 4 TABLETS BY MOUTH 1 HOUR BEFORE DENTAL APPOINTMENT      aspirin 81 MG EC tablet Take 1 tablet (81 mg) by mouth  "daily Start tomorrow. 30 tablet 3    clopidogrel (PLAVIX) 75 MG tablet Take 1 tablet (75 mg) by mouth daily 90 tablet 3    fish oil-omega-3 fatty acids 1000 MG capsule Take 2 g by mouth daily      medical cannabis (Patient's own supply) by Other route See Admin Instructions      multivitamin (ONE A DAY) per tablet Take 1 tablet by mouth daily      pantoprazole (PROTONIX) 40 MG EC tablet Take 1 tablet (40 mg) by mouth 2 times daily 60 tablet 1    rosuvastatin (CRESTOR) 20 MG tablet Take 1 tablet (20 mg) by mouth daily 90 tablet 3    tamsulosin (FLOMAX) 0.4 MG capsule TAKE 2 CAPSULES(0.8 MG) BY MOUTH DAILY 180 capsule 3    triamcinolone (KENALOG) 0.1 % external ointment APPLY TO AFFECTED AREAS TWICE DAILY. TAPER WITH IMPROVEMENT            Objective:    Vitals:    04/17/24 0717   BP: 138/88   Pulse: 81   Resp: 13   Temp: 98.8  F (37.1  C)   SpO2: 98%   Weight: 112 kg (247 lb)   Height: 1.778 m (5' 10\")      Body mass index is 35.44 kg/m .    Alert.  No apparent distress.  Chest clear.  Cardiac exam regular.  Abdomen without distention.  Positive bowel sounds.  No hepatosplenomegaly.  No guarding or rebound.        EXAM: CT ABDOMEN PELVIS W CONTRAST  LOCATION: [facility]  DATE/TIME: 11/8/2021 1:13 PM     INDICATION:  Left lower quadrant abdominal pain.  COMPARISON: 9/28/2020  TECHNIQUE: CT scan of the abdomen and pelvis was performed following injection of IV contrast. Multiplanar reformats were obtained. Dose reduction techniques were used.  CONTRAST: Isovue-370 100mL      FINDINGS:   LOWER CHEST: Small sliding-type hiatal hernia.     HEPATOBILIARY: Normal.     PANCREAS: Normal.     SPLEEN: Normal.     ADRENAL GLANDS: Normal.     KIDNEYS/BLADDER: Stable 4.5 cm right upper pole cortical cyst. No urinary tract calculus nor hydronephrosis. The bladder is nondistended.     BOWEL: Distal sigmoid anastomosis redemonstrated. No obstruction nor inflammatory change.     LYMPH NODES: No " lymphadenopathy.     VASCULATURE: No aortoiliac aneurysm.     PELVIC ORGANS: Normal.     MUSCULOSKELETAL: Fat-containing right inguinal hernia unchanged. No suspicious osseous lesions.                                                                      IMPRESSION:   1.  No acute abnormality to account for symptoms of left lower quadrant pain. Stable postop changes with distal sigmoid anastomosis. No obstruction or inflammatory change.  2.  Small fat-containing right inguinal hernia.      This note has been dictated using voice recognition software and as a result may contain minor grammatical errors and unintended word substitutions.         Answers submitted by the patient for this visit:  Lipid Visit (Submitted on 4/14/2024)  Chief Complaint: Chronic problems general questions HPI Form  Are you regularly taking any medication or supplement to lower your cholesterol?: Yes  Are you having muscle aches or other side effects that you think could be caused by your cholesterol lowering medication?: Yes  Patient Health Questionnaire (Submitted on 4/17/2024)  If you checked off any problems, how difficult have these problems made it for you to do your work, take care of things at home, or get along with other people?: Not difficult at all  PHQ9 TOTAL SCORE: 3  Hypertension Visit (Submitted on 4/14/2024)  Chief Complaint: Chronic problems general questions HPI Form  Do you check your blood pressure regularly outside of the clinic?: No  Are your blood pressures ever more than 140 on the top number (systolic) OR more than 90 on the bottom number (diastolic)? (For example, greater than 140/90): Yes  Are you following a low salt diet?: No  General Questionnaire (Submitted on 4/14/2024)  Chief Complaint: Chronic problems general questions HPI Form  How many servings of fruits and vegetables do you eat daily?: 4 or more  On average, how many sweetened beverages do you drink each day (Examples: soda, juice, sweet tea, etc.  Do  NOT count diet or artificially sweetened beverages)?: 0  How many minutes a day do you exercise enough to make your heart beat faster?: 30 to 60  How many days a week do you exercise enough to make your heart beat faster?: 5  How many days per week do you miss taking your medication?: 0

## 2024-04-18 LAB
ALBUMIN SERPL BCG-MCNC: 4.5 G/DL (ref 3.5–5.2)
ALP SERPL-CCNC: 62 U/L (ref 40–150)
ALT SERPL W P-5'-P-CCNC: 27 U/L (ref 0–70)
ANION GAP SERPL CALCULATED.3IONS-SCNC: 9 MMOL/L (ref 7–15)
AST SERPL W P-5'-P-CCNC: 29 U/L (ref 0–45)
BILIRUB SERPL-MCNC: 0.6 MG/DL
BUN SERPL-MCNC: 16.4 MG/DL (ref 8–23)
CALCIUM SERPL-MCNC: 9.3 MG/DL (ref 8.8–10.2)
CHLORIDE SERPL-SCNC: 99 MMOL/L (ref 98–107)
CHOLEST SERPL-MCNC: 154 MG/DL
CREAT SERPL-MCNC: 1.23 MG/DL (ref 0.67–1.17)
DEPRECATED HCO3 PLAS-SCNC: 26 MMOL/L (ref 22–29)
EGFRCR SERPLBLD CKD-EPI 2021: 65 ML/MIN/1.73M2
FASTING STATUS PATIENT QL REPORTED: ABNORMAL
GLUCOSE SERPL-MCNC: 98 MG/DL (ref 70–99)
HDLC SERPL-MCNC: 47 MG/DL
LDLC SERPL CALC-MCNC: 76 MG/DL
LIPASE SERPL-CCNC: 54 U/L (ref 13–60)
NONHDLC SERPL-MCNC: 107 MG/DL
POTASSIUM SERPL-SCNC: 4.7 MMOL/L (ref 3.4–5.3)
PROT SERPL-MCNC: 7.3 G/DL (ref 6.4–8.3)
SODIUM SERPL-SCNC: 134 MMOL/L (ref 135–145)
TRIGL SERPL-MCNC: 155 MG/DL

## 2024-04-22 ENCOUNTER — OFFICE VISIT (OUTPATIENT)
Dept: FAMILY MEDICINE | Facility: CLINIC | Age: 69
End: 2024-04-22
Payer: COMMERCIAL

## 2024-04-22 VITALS
SYSTOLIC BLOOD PRESSURE: 142 MMHG | HEART RATE: 81 BPM | WEIGHT: 246.5 LBS | DIASTOLIC BLOOD PRESSURE: 88 MMHG | OXYGEN SATURATION: 95 % | TEMPERATURE: 97.9 F | RESPIRATION RATE: 12 BRPM | HEIGHT: 70 IN | BODY MASS INDEX: 35.29 KG/M2

## 2024-04-22 DIAGNOSIS — R79.89 ELEVATED SERUM CREATININE: ICD-10-CM

## 2024-04-22 DIAGNOSIS — I10 ESSENTIAL HYPERTENSION, BENIGN: ICD-10-CM

## 2024-04-22 DIAGNOSIS — I25.118 CORONARY ARTERY DISEASE OF NATIVE ARTERY OF NATIVE HEART WITH STABLE ANGINA PECTORIS (H): ICD-10-CM

## 2024-04-22 DIAGNOSIS — M54.50 LOW BACK PAIN, UNSPECIFIED BACK PAIN LATERALITY, UNSPECIFIED CHRONICITY, UNSPECIFIED WHETHER SCIATICA PRESENT: Primary | ICD-10-CM

## 2024-04-22 DIAGNOSIS — R31.9 HEMATURIA, UNSPECIFIED TYPE: ICD-10-CM

## 2024-04-22 LAB
ALBUMIN UR-MCNC: 30 MG/DL
APPEARANCE UR: CLEAR
BACTERIA #/AREA URNS HPF: NORMAL /HPF
BILIRUB UR QL STRIP: NEGATIVE
COLOR UR AUTO: YELLOW
GLUCOSE UR STRIP-MCNC: NEGATIVE MG/DL
HGB UR QL STRIP: ABNORMAL
HOLD SPECIMEN: NORMAL
KETONES UR STRIP-MCNC: NEGATIVE MG/DL
LEUKOCYTE ESTERASE UR QL STRIP: NEGATIVE
NITRATE UR QL: NEGATIVE
PH UR STRIP: 6.5 [PH] (ref 5–8)
RBC #/AREA URNS AUTO: NORMAL /HPF
SP GR UR STRIP: 1.01 (ref 1–1.03)
SQUAMOUS #/AREA URNS AUTO: NORMAL /LPF
UROBILINOGEN UR STRIP-ACNC: 0.2 E.U./DL
WBC #/AREA URNS AUTO: NORMAL /HPF

## 2024-04-22 PROCEDURE — 36415 COLL VENOUS BLD VENIPUNCTURE: CPT | Performed by: FAMILY MEDICINE

## 2024-04-22 PROCEDURE — 81001 URINALYSIS AUTO W/SCOPE: CPT | Performed by: FAMILY MEDICINE

## 2024-04-22 PROCEDURE — 99213 OFFICE O/P EST LOW 20 MIN: CPT | Performed by: FAMILY MEDICINE

## 2024-04-22 PROCEDURE — 80048 BASIC METABOLIC PNL TOTAL CA: CPT | Performed by: FAMILY MEDICINE

## 2024-04-22 RX ORDER — AMLODIPINE BESYLATE 10 MG/1
10 TABLET ORAL DAILY
COMMUNITY
Start: 2024-04-22 | End: 2024-05-15

## 2024-04-22 RX ORDER — METHYLPREDNISOLONE 4 MG
TABLET, DOSE PACK ORAL
Qty: 21 TABLET | Refills: 0 | Status: SHIPPED | OUTPATIENT
Start: 2024-04-22

## 2024-04-22 ASSESSMENT — ENCOUNTER SYMPTOMS: BACK PAIN: 1

## 2024-04-22 NOTE — ASSESSMENT & PLAN NOTE
This was not a focus of today's visit.  The patient had reported that he was taking 20 mg/day.  We clarified.  He believes he is taking 1 tablet of the most recently prescribed amlodipine.  This would correspond to 10 mg.

## 2024-04-22 NOTE — ASSESSMENT & PLAN NOTE
Symptoms most consistent with musculoskeletal low back pain.  No red flag symptoms.  Trace blood on urinary dipstick but no RBCs.  Reviewed imaging including previous encounter in which he had a CT stone run which did not show kidney stones.  His pain has not been colicky.  He has been avoiding taking NSAIDs and Tylenol given his recent heart attack.  - Medrol Dosepak for anti-inflammatory effect.  - Discussed potential benefit of Tylenol  - Consider physical therapy referral if not improving.  - The patient scheduled his appointment mostly because he was concerned that this could be related to his kidneys.  BMP reviewed from last week.  We also run 1 today for comparison.

## 2024-04-22 NOTE — PROGRESS NOTES
"  Assessment & Plan   Problem List Items Addressed This Visit       Benign Essential Hypertension     This was not a focus of today's visit.  The patient had reported that he was taking 20 mg/day.  We clarified.  He believes he is taking 1 tablet of the most recently prescribed amlodipine.  This would correspond to 10 mg.         Relevant Medications    amLODIPine (NORVASC) 10 MG tablet    Coronary artery disease of native artery of native heart with stable angina pectoris (H24)     No chest pain.  Low back pain.          Hematuria     No RBCs today.  Unlikely to be stone.         Lower Back Pain - Primary     Symptoms most consistent with musculoskeletal low back pain.  No red flag symptoms.  Trace blood on urinary dipstick but no RBCs.  Reviewed imaging including previous encounter in which he had a CT stone run which did not show kidney stones.  His pain has not been colicky.  He has been avoiding taking NSAIDs and Tylenol given his recent heart attack.  - Medrol Dosepak for anti-inflammatory effect.  - Discussed potential benefit of Tylenol  - Consider physical therapy referral if not improving.  - The patient scheduled his appointment mostly because he was concerned that this could be related to his kidneys.  BMP reviewed from last week.  We also run 1 today for comparison.         Relevant Medications    methylPREDNISolone (MEDROL DOSEPAK) 4 MG tablet therapy pack     Other Visit Diagnoses       Elevated serum creatinine        Relevant Orders    Basic metabolic panel  (Ca, Cl, CO2, Creat, Gluc, K, Na, BUN)            Subjective   Grzegorz is a 68 year old, presenting for the following health issues:  Back Pain (Back pain for the last 2-3 days with ongoing hematuria for a while)        4/22/2024    11:32 AM   Additional Questions   Roomed by catalino     Back pain: across the back.  Headache history.  Worse over the past 3 days. Pain with movement.  Standing is better.     - history of hematuria.  \"For years.\"     - no " "saddle anesthesia, weakness, no urinary incontinence      Back Pain                Objective    BP (!) 142/88 (BP Location: Left arm, Patient Position: Sitting, Cuff Size: Adult Large)   Pulse 81   Temp 97.9  F (36.6  C) (Oral)   Resp 12   Ht 1.778 m (5' 10\")   Wt 111.8 kg (246 lb 8 oz)   SpO2 95%   BMI 35.37 kg/m    Body mass index is 35.37 kg/m .  Physical Exam  Nursing note reviewed.   Constitutional:       General: He is not in acute distress.     Appearance: Normal appearance. He is not ill-appearing.   HENT:      Head: Normocephalic and atraumatic.   Eyes:      Extraocular Movements: Extraocular movements intact.      Conjunctiva/sclera: Conjunctivae normal.   Pulmonary:      Effort: Pulmonary effort is normal.   Neurological:      Mental Status: He is alert and oriented to person, place, and time.      Motor: No weakness.      Gait: Gait normal.   Psychiatric:         Attention and Perception: Attention normal.         Mood and Affect: Mood normal.         Speech: Speech normal.         Thought Content: Thought content normal.                    Signed Electronically by: Linwood Adan MD    "

## 2024-04-23 LAB
ANION GAP SERPL CALCULATED.3IONS-SCNC: 8 MMOL/L (ref 7–15)
BUN SERPL-MCNC: 20.2 MG/DL (ref 8–23)
CALCIUM SERPL-MCNC: 9.7 MG/DL (ref 8.8–10.2)
CHLORIDE SERPL-SCNC: 100 MMOL/L (ref 98–107)
CREAT SERPL-MCNC: 1.22 MG/DL (ref 0.67–1.17)
DEPRECATED HCO3 PLAS-SCNC: 28 MMOL/L (ref 22–29)
EGFRCR SERPLBLD CKD-EPI 2021: 65 ML/MIN/1.73M2
GLUCOSE SERPL-MCNC: 97 MG/DL (ref 70–99)
POTASSIUM SERPL-SCNC: 4.6 MMOL/L (ref 3.4–5.3)
SODIUM SERPL-SCNC: 136 MMOL/L (ref 135–145)

## 2024-05-03 ENCOUNTER — MYC MEDICAL ADVICE (OUTPATIENT)
Dept: CARDIOLOGY | Facility: CLINIC | Age: 69
End: 2024-05-03
Payer: COMMERCIAL

## 2024-05-07 ENCOUNTER — HOSPITAL ENCOUNTER (OUTPATIENT)
Dept: CT IMAGING | Facility: CLINIC | Age: 69
Discharge: HOME | End: 2024-05-07
Attending: FAMILY MEDICINE | Admitting: FAMILY MEDICINE
Payer: COMMERCIAL

## 2024-05-07 DIAGNOSIS — R10.12 LUQ ABDOMINAL PAIN: ICD-10-CM

## 2024-05-07 PROCEDURE — 250N000011 HC RX IP 250 OP 636: Performed by: FAMILY MEDICINE

## 2024-05-07 PROCEDURE — 74177 CT ABD & PELVIS W/CONTRAST: CPT

## 2024-05-07 RX ORDER — IOPAMIDOL 755 MG/ML
90 INJECTION, SOLUTION INTRAVASCULAR ONCE
Status: COMPLETED | OUTPATIENT
Start: 2024-05-07 | End: 2024-05-07

## 2024-05-07 RX ADMIN — IOPAMIDOL 90 ML: 755 INJECTION, SOLUTION INTRAVENOUS at 20:13

## 2024-05-14 ENCOUNTER — TELEPHONE (OUTPATIENT)
Dept: FAMILY MEDICINE | Facility: CLINIC | Age: 69
End: 2024-05-14

## 2024-05-14 NOTE — TELEPHONE ENCOUNTER
[place] Community Measures Blood Pressure [place] reviewed.  Patients recent blood pressure is outside of guideline parameters.      Called pt to review, no answer.  Left voicemail message asking patient to check their blood pressure using a home blood pressure cuff or by going to a [facility].      Patient instructed to then call [phone] ([facility]) and leave a message with their name, date of birth, and blood pressure reading that was completed within the last 24 hours and where it was completed.      Will await call back for further review. There was no response from the [employer] message sent on 5/3/24.  #1 attempt to call. ThanksAlpesh Grant MA

## 2024-05-15 DIAGNOSIS — I10 ESSENTIAL HYPERTENSION, BENIGN: ICD-10-CM

## 2024-05-15 NOTE — TELEPHONE ENCOUNTER
Medication Question or Refill    Contacts         Type Contact Phone/Fax    05/15/2024 02:43 PM CDT Phone (Incoming) Grzegorz Roth (Self) [phone] (M)            What medication are you calling about (include dose and sig)?: Refill request amLODIPine (NORVASC) 10 MG tablet the said this medication was not approval he only have 5 day left of this medication      Preferred Pharmacy:   [id number] - [place], [place] - [address] [place] [facility] [place] [address]  [place] [address]  Phone: [phone] Fax: [phone]      Controlled Substance Agreement on file:   CSA -- Patient Level:    CSA: None found at the patient level.       Who prescribed the medication?: hason    Do you need a refill? Yes    When did you use the medication last? 05/15/24    Patient offered an appointment? No    Do you have any questions or concerns?  No      Could we send this information to you in [facility] or would you prefer to receive a phone call?:   Patient would prefer a phone call   Okay to leave a detailed message?: Yes at Cell number on file:    Telephone Information:   Mobile [phone]

## 2024-05-16 RX ORDER — AMLODIPINE BESYLATE 10 MG/1
10 TABLET ORAL DAILY
Qty: 90 TABLET | Refills: 1 | Status: SHIPPED | OUTPATIENT
Start: 2024-05-16

## 2024-05-17 ENCOUNTER — TELEPHONE (OUTPATIENT)
Dept: FAMILY MEDICINE | Facility: CLINIC | Age: 69
End: 2024-05-17

## 2024-05-17 NOTE — TELEPHONE ENCOUNTER
General Call    Contacts         Type Contact Phone/Fax    05/17/2024 03:36 PM CDT Phone (Incoming) Grzegorz Roth (Self) [phone] (M)          Reason for Call:  FYI    What are your questions or concerns:  [facility] called to verify that the referral for the CT scan was put in correctly because the referral states it with and without contrast, and asked if the patient had another appt scheduled without contrast because the CT is orignally with contrast. Writer relayed that there's no other appt for a CT without contrast and the referral states with or without contrast and there was no new referral. Caller stated the claim is currently being processed and both the provider and patient will get the outcome once its been processed.     Could we send this information to you in [employer] or would you prefer to receive a phone call?:   No preference   Okay to leave a detailed message?: Yes at Home number on file [phone] (home)

## 2024-05-21 NOTE — TELEPHONE ENCOUNTER
Patient returned call and left voicemail message with update blood pressure reading.      Last Blood Pressure: 142/88  Last Heart Rate: 84  Date: 4/22/24  Location: [facility]    Today's Blood Pressure: 133/73  Today's Heart Rate: not taken  Location: Home BP    Patient reported blood pressure updated in Epic. Blood pressure falls within [place] Community Measures guidelines.  Patient will follow up as previously advised. Thanks    TANA Grant

## 2024-05-22 ENCOUNTER — TRANSFERRED RECORDS (OUTPATIENT)
Dept: HEALTH INFORMATION MANAGEMENT | Facility: CLINIC | Age: 69
End: 2024-05-22
Payer: COMMERCIAL

## 2024-05-31 DIAGNOSIS — K21.00 GASTROESOPHAGEAL REFLUX DISEASE WITH ESOPHAGITIS WITHOUT HEMORRHAGE: ICD-10-CM

## 2024-05-31 RX ORDER — PANTOPRAZOLE SODIUM 40 MG/1
40 TABLET, DELAYED RELEASE ORAL 2 TIMES DAILY
Qty: 60 TABLET | Refills: 1 | OUTPATIENT
Start: 2024-05-31

## 2024-06-01 RX ORDER — PANTOPRAZOLE SODIUM 40 MG/1
40 TABLET, DELAYED RELEASE ORAL 2 TIMES DAILY
Qty: 60 TABLET | Refills: 3 | Status: SHIPPED | OUTPATIENT
Start: 2024-06-01

## 2024-06-13 ENCOUNTER — APPOINTMENT (OUTPATIENT)
Dept: URBAN - METROPOLITAN AREA CLINIC 260 | Age: 69
Setting detail: DERMATOLOGY
End: 2024-06-13

## 2024-06-13 VITALS — HEIGHT: 70 IN | WEIGHT: 235 LBS

## 2024-06-13 DIAGNOSIS — D22 MELANOCYTIC NEVI: ICD-10-CM

## 2024-06-13 DIAGNOSIS — L82.0 INFLAMED SEBORRHEIC KERATOSIS: ICD-10-CM

## 2024-06-13 DIAGNOSIS — L82.1 OTHER SEBORRHEIC KERATOSIS: ICD-10-CM

## 2024-06-13 DIAGNOSIS — L81.4 OTHER MELANIN HYPERPIGMENTATION: ICD-10-CM

## 2024-06-13 DIAGNOSIS — D49.2 NEOPLASM OF UNSPECIFIED BEHAVIOR OF BONE, SOFT TISSUE, AND SKIN: ICD-10-CM

## 2024-06-13 DIAGNOSIS — D18.0 HEMANGIOMA: ICD-10-CM

## 2024-06-13 PROBLEM — D22.5 MELANOCYTIC NEVI OF TRUNK: Status: ACTIVE | Noted: 2024-06-13

## 2024-06-13 PROBLEM — D18.01 HEMANGIOMA OF SKIN AND SUBCUTANEOUS TISSUE: Status: ACTIVE | Noted: 2024-06-13

## 2024-06-13 PROCEDURE — OTHER BIOPSY BY SHAVE METHOD: OTHER

## 2024-06-13 PROCEDURE — OTHER MIPS QUALITY: OTHER

## 2024-06-13 PROCEDURE — 17110 DESTRUCT B9 LESION 1-14: CPT

## 2024-06-13 PROCEDURE — OTHER LIQUID NITROGEN: OTHER

## 2024-06-13 PROCEDURE — 99213 OFFICE O/P EST LOW 20 MIN: CPT | Mod: 25

## 2024-06-13 PROCEDURE — OTHER PHOTO-DOCUMENTATION: OTHER

## 2024-06-13 PROCEDURE — OTHER COUNSELING: OTHER

## 2024-06-13 PROCEDURE — 11102 TANGNTL BX SKIN SINGLE LES: CPT | Mod: 59

## 2024-06-13 ASSESSMENT — LOCATION SIMPLE DESCRIPTION DERM
LOCATION SIMPLE: RIGHT UPPER BACK
LOCATION SIMPLE: SCALP
LOCATION SIMPLE: LOWER BACK
LOCATION SIMPLE: UPPER BACK

## 2024-06-13 ASSESSMENT — LOCATION DETAILED DESCRIPTION DERM
LOCATION DETAILED: INFERIOR THORACIC SPINE
LOCATION DETAILED: LEFT CENTRAL FRONTAL SCALP
LOCATION DETAILED: RIGHT SUPERIOR UPPER BACK
LOCATION DETAILED: SUPERIOR LUMBAR SPINE

## 2024-06-13 ASSESSMENT — LOCATION ZONE DERM
LOCATION ZONE: SCALP
LOCATION ZONE: TRUNK

## 2024-06-13 NOTE — PROCEDURE: LIQUID NITROGEN
Medical Necessity Clause: This procedure was medically necessary because the lesions that were treated were:
Consent: The patient's consent was obtained including but not limited to risks of crusting, scabbing, blistering, scarring, darker or lighter pigmentary change, recurrence, incomplete removal and infection.
Spray Paint Technique: No
Spray Paint Text: The liquid nitrogen was applied to the skin utilizing a spray paint frosting technique.
Number Of Freeze-Thaw Cycles: 3 freeze-thaw cycles
Post-Care Instructions: I reviewed with the patient in detail post-care instructions. Patient is to wear sunprotection, and avoid picking at any of the treated lesions. Pt may apply Vaseline to crusted or scabbing areas.
Show Applicator Variable?: Yes
Detail Level: Detailed
Medical Necessity Information: It is in your best interest to select a reason for this procedure from the list below. All of these items fulfill various CMS LCD requirements except the new and changing color options.

## 2024-06-13 NOTE — PROCEDURE: BIOPSY BY SHAVE METHOD
Detail Level: Detailed
Depth Of Biopsy: dermis
Was A Bandage Applied: Yes
Size Of Lesion In Cm: 0
Biopsy Type: H and E
Biopsy Method: Dermablade
Anesthesia Type: 1% lidocaine with epinephrine
Anesthesia Volume In Cc: 0.5
Hemostasis: Drysol
Wound Care: Petrolatum
Dressing: bandage
Destruction After The Procedure: No
Type Of Destruction Used: Curettage
Curettage Text: The wound bed was treated with curettage after the biopsy was performed.
Cryotherapy Text: The wound bed was treated with cryotherapy after the biopsy was performed.
Electrodesiccation Text: The wound bed was treated with electrodesiccation after the biopsy was performed.
Electrodesiccation And Curettage Text: The wound bed was treated with electrodesiccation and curettage after the biopsy was performed.
Silver Nitrate Text: The wound bed was treated with silver nitrate after the biopsy was performed.
Lab: -[id number]
Consent: Written consent was obtained and risks were reviewed including but not limited to scarring, infection, bleeding, scabbing, incomplete removal, nerve damage and allergy to anesthesia.
Post-Care Instructions: I reviewed with the patient in detail post-care instructions. Patient is to keep the biopsy site dry overnight, and then apply bacitracin twice daily until healed. Patient may apply hydrogen peroxide soaks to remove any crusting.
Notification Instructions: Patient will be notified of biopsy results. However, patient instructed to call the office if not contacted within 2 weeks.
Billing Type: Third-Party Bill
Information: Selecting Yes will display possible errors in your note based on the variables you have selected. This validation is only offered as a suggestion for you. PLEASE NOTE THAT THE VALIDATION TEXT WILL BE REMOVED WHEN YOU FINALIZE YOUR NOTE. IF YOU WANT TO FAX A PRELIMINARY NOTE YOU WILL NEED TO TOGGLE THIS TO 'NO' IF YOU DO NOT WANT IT IN YOUR FAXED NOTE.

## 2024-07-22 ENCOUNTER — APPOINTMENT (OUTPATIENT)
Dept: URBAN - METROPOLITAN AREA CLINIC 260 | Age: 69
Setting detail: DERMATOLOGY
End: 2024-07-22

## 2024-07-22 DIAGNOSIS — L82.0 INFLAMED SEBORRHEIC KERATOSIS: ICD-10-CM

## 2024-07-22 PROBLEM — C44.519 BASAL CELL CARCINOMA OF SKIN OF OTHER PART OF TRUNK: Status: ACTIVE | Noted: 2024-07-22

## 2024-07-22 PROCEDURE — OTHER LIQUID NITROGEN: OTHER

## 2024-07-22 PROCEDURE — OTHER COUNSELING: OTHER

## 2024-07-22 PROCEDURE — 11301 SHAVE SKIN LESION 0.6-1.0 CM: CPT

## 2024-07-22 PROCEDURE — OTHER PHOTO-DOCUMENTATION: OTHER

## 2024-07-22 PROCEDURE — OTHER SHAVE REMOVAL: OTHER

## 2024-07-22 PROCEDURE — 17110 DESTRUCT B9 LESION 1-14: CPT | Mod: 59

## 2024-07-22 PROCEDURE — OTHER MIPS QUALITY: OTHER

## 2024-07-22 ASSESSMENT — LOCATION DETAILED DESCRIPTION DERM: LOCATION DETAILED: LEFT CENTRAL FRONTAL SCALP

## 2024-07-22 ASSESSMENT — LOCATION ZONE DERM: LOCATION ZONE: SCALP

## 2024-07-22 ASSESSMENT — LOCATION SIMPLE DESCRIPTION DERM: LOCATION SIMPLE: SCALP

## 2024-07-22 NOTE — PROCEDURE: SHAVE REMOVAL
Medical Necessity Information: It is in your best interest to select a reason for this procedure from the list below. All of these items fulfill various CMS LCD requirements except the new and changing color options.
Medical Necessity Clause: This procedure was medically necessary because the lesion that was treated was:
Lab: -[id number]
Lab Facility: [id number]
Detail Level: Detailed
Was A Bandage Applied: Yes
Size Of Lesion In Cm (Required): 1
Depth Of Shave: dermis
Biopsy Method: Dermablade
Anesthesia Type: 1% lidocaine with epinephrine and a 1:10 solution of 8.4% sodium bicarbonate
Anesthesia Volume In Cc: 0.4
Hemostasis: Drysol
Wound Care: Petrolatum
Path Notes (To The Dermatopathologist): Please check margins [id number]
Render Path Notes In Note?: No
Consent was obtained from the patient. The risks and benefits to therapy were discussed in detail. Specifically, the risks of infection, scarring, bleeding, prolonged wound healing, incomplete removal, allergy to anesthesia, nerve injury and recurrence were addressed. Prior to the procedure, the treatment site was clearly identified and confirmed by the patient. All components of Universal Protocol/PAUSE Rule completed.
Post-Care Instructions: I reviewed with the patient in detail post-care instructions. Patient is to keep the biopsy site dry overnight, and then apply bacitracin twice daily until healed. Patient may apply hydrogen peroxide soaks to remove any crusting.
Notification Instructions: Patient will be notified of pathology results. However, patient instructed to call the office if not contacted within 2 weeks.
Billing Type: Third-Party Bill

## 2024-08-13 ENCOUNTER — TELEPHONE (OUTPATIENT)
Dept: CARDIOLOGY | Facility: CLINIC | Age: 69
End: 2024-08-13
Payer: COMMERCIAL

## 2024-08-13 NOTE — TELEPHONE ENCOUNTER
Dr Walker please review. 6 months s/p KATERINA placement on 2/12/24. Ok to hold Plavix for 7 days prior, and continue on Aspirin? Or alternate recommendation? Thank you CMM,Rn

## 2024-08-13 NOTE — TELEPHONE ENCOUNTER
PC to [place] [facility] nurse line and left detailed message of recommendations from provider. Return call if questions [phone]. NEVAEH,Rn

## 2024-08-13 NOTE — TELEPHONE ENCOUNTER
[facility] Call Center    Phone Message    May a detailed message be left on voicemail: no     Reason for Call: Order(s): Other:   Reason for requested: 7 day hold for plavix for EGD on 8/26/24  Date needed: whenever possible  Provider name: Victorina      Please call [phone] to leave holding orders for pt.        Action Taken: Other: cardio    Travel Screening: Not Applicable     Date of Service:

## 2024-08-13 NOTE — TELEPHONE ENCOUNTER
===View-only below this line===  ----- Message -----  From: Dino Prajapati DO  Sent: 8/13/2024   4:02 PM CDT  To: Harris Rebolledo RN    Can hold plavix for 7 days given 6 month of DAPT.  Continue ASA.

## 2024-08-26 ENCOUNTER — TRANSFERRED RECORDS (OUTPATIENT)
Dept: HEALTH INFORMATION MANAGEMENT | Facility: CLINIC | Age: 69
End: 2024-08-26
Payer: COMMERCIAL

## 2024-08-27 DIAGNOSIS — R32 URINARY INCONTINENCE, UNSPECIFIED TYPE: ICD-10-CM

## 2024-08-27 DIAGNOSIS — N13.8 BPH WITH URINARY OBSTRUCTION: ICD-10-CM

## 2024-08-27 DIAGNOSIS — N40.1 BPH WITH URINARY OBSTRUCTION: ICD-10-CM

## 2024-08-27 RX ORDER — TAMSULOSIN HYDROCHLORIDE 0.4 MG/1
0.8 CAPSULE ORAL DAILY
Qty: 180 CAPSULE | Refills: 1 | Status: SHIPPED | OUTPATIENT
Start: 2024-08-27

## 2024-09-13 ENCOUNTER — TRANSFERRED RECORDS (OUTPATIENT)
Dept: HEALTH INFORMATION MANAGEMENT | Facility: CLINIC | Age: 69
End: 2024-09-13
Payer: COMMERCIAL

## 2024-09-18 ENCOUNTER — MEDICAL CORRESPONDENCE (OUTPATIENT)
Dept: HEALTH INFORMATION MANAGEMENT | Facility: CLINIC | Age: 69
End: 2024-09-18
Payer: COMMERCIAL

## 2024-09-25 ENCOUNTER — TRANSFERRED RECORDS (OUTPATIENT)
Dept: HEALTH INFORMATION MANAGEMENT | Facility: CLINIC | Age: 69
End: 2024-09-25
Payer: COMMERCIAL

## 2024-10-15 SDOH — HEALTH STABILITY: PHYSICAL HEALTH: ON AVERAGE, HOW MANY MINUTES DO YOU ENGAGE IN EXERCISE AT THIS LEVEL?: 60 MIN

## 2024-10-15 SDOH — HEALTH STABILITY: PHYSICAL HEALTH: ON AVERAGE, HOW MANY DAYS PER WEEK DO YOU ENGAGE IN MODERATE TO STRENUOUS EXERCISE (LIKE A BRISK WALK)?: 4 DAYS

## 2024-10-15 ASSESSMENT — SOCIAL DETERMINANTS OF HEALTH (SDOH): HOW OFTEN DO YOU GET TOGETHER WITH FRIENDS OR RELATIVES?: THREE TIMES A WEEK

## 2024-10-18 ENCOUNTER — OFFICE VISIT (OUTPATIENT)
Dept: FAMILY MEDICINE | Facility: CLINIC | Age: 69
End: 2024-10-18
Attending: FAMILY MEDICINE
Payer: COMMERCIAL

## 2024-10-18 VITALS
RESPIRATION RATE: 14 BRPM | BODY MASS INDEX: 34.36 KG/M2 | TEMPERATURE: 98.7 F | SYSTOLIC BLOOD PRESSURE: 108 MMHG | HEIGHT: 69 IN | HEART RATE: 95 BPM | WEIGHT: 232 LBS | DIASTOLIC BLOOD PRESSURE: 74 MMHG | OXYGEN SATURATION: 95 %

## 2024-10-18 DIAGNOSIS — I10 ESSENTIAL HYPERTENSION, BENIGN: ICD-10-CM

## 2024-10-18 DIAGNOSIS — N40.1 BPH WITH URINARY OBSTRUCTION: ICD-10-CM

## 2024-10-18 DIAGNOSIS — Z00.00 MEDICARE ANNUAL WELLNESS VISIT, SUBSEQUENT: Primary | ICD-10-CM

## 2024-10-18 DIAGNOSIS — E78.5 HYPERLIPIDEMIA LDL GOAL <100: ICD-10-CM

## 2024-10-18 DIAGNOSIS — Z12.5 SCREENING FOR PROSTATE CANCER: ICD-10-CM

## 2024-10-18 DIAGNOSIS — N13.8 BPH WITH URINARY OBSTRUCTION: ICD-10-CM

## 2024-10-18 DIAGNOSIS — M54.50 CHRONIC RIGHT-SIDED LOW BACK PAIN WITHOUT SCIATICA: ICD-10-CM

## 2024-10-18 DIAGNOSIS — E66.811 CLASS 1 OBESITY WITHOUT SERIOUS COMORBIDITY WITH BODY MASS INDEX (BMI) OF 33.0 TO 33.9 IN ADULT, UNSPECIFIED OBESITY TYPE: ICD-10-CM

## 2024-10-18 DIAGNOSIS — G89.29 CHRONIC RIGHT-SIDED LOW BACK PAIN WITHOUT SCIATICA: ICD-10-CM

## 2024-10-18 DIAGNOSIS — K21.00 GASTROESOPHAGEAL REFLUX DISEASE WITH ESOPHAGITIS WITHOUT HEMORRHAGE: ICD-10-CM

## 2024-10-18 DIAGNOSIS — Z95.5 S/P CORONARY ARTERY STENT PLACEMENT: ICD-10-CM

## 2024-10-18 DIAGNOSIS — I25.118 CORONARY ARTERY DISEASE OF NATIVE ARTERY OF NATIVE HEART WITH STABLE ANGINA PECTORIS (H): ICD-10-CM

## 2024-10-18 DIAGNOSIS — D12.6 SERRATED ADENOMA OF COLON: ICD-10-CM

## 2024-10-18 LAB
ERYTHROCYTE [DISTWIDTH] IN BLOOD BY AUTOMATED COUNT: 12.5 % (ref 10–15)
HCT VFR BLD AUTO: 48.4 % (ref 40–53)
HGB BLD-MCNC: 17.1 G/DL (ref 13.3–17.7)
MCH RBC QN AUTO: 29.9 PG (ref 26.5–33)
MCHC RBC AUTO-ENTMCNC: 35.3 G/DL (ref 31.5–36.5)
MCV RBC AUTO: 85 FL (ref 78–100)
PLATELET # BLD AUTO: 178 10E3/UL (ref 150–450)
RBC # BLD AUTO: 5.72 10E6/UL (ref 4.4–5.9)
WBC # BLD AUTO: 5.9 10E3/UL (ref 4–11)

## 2024-10-18 PROCEDURE — 80053 COMPREHEN METABOLIC PANEL: CPT | Performed by: FAMILY MEDICINE

## 2024-10-18 PROCEDURE — 80061 LIPID PANEL: CPT | Performed by: FAMILY MEDICINE

## 2024-10-18 PROCEDURE — G0439 PPPS, SUBSEQ VISIT: HCPCS | Performed by: FAMILY MEDICINE

## 2024-10-18 PROCEDURE — 85027 COMPLETE CBC AUTOMATED: CPT | Performed by: FAMILY MEDICINE

## 2024-10-18 PROCEDURE — 99214 OFFICE O/P EST MOD 30 MIN: CPT | Mod: 25 | Performed by: FAMILY MEDICINE

## 2024-10-18 PROCEDURE — 36415 COLL VENOUS BLD VENIPUNCTURE: CPT | Performed by: FAMILY MEDICINE

## 2024-10-18 PROCEDURE — G0103 PSA SCREENING: HCPCS | Performed by: FAMILY MEDICINE

## 2024-10-18 RX ORDER — PANTOPRAZOLE SODIUM 40 MG/1
40 TABLET, DELAYED RELEASE ORAL DAILY
COMMUNITY
Start: 2024-10-18

## 2024-10-18 RX ORDER — AMLODIPINE BESYLATE 10 MG/1
10 TABLET ORAL DAILY
Qty: 90 TABLET | Refills: 3 | Status: SHIPPED | OUTPATIENT
Start: 2024-10-18

## 2024-10-18 RX ORDER — METHYLCELLULOSE 500 MG/1
TABLET ORAL EVERY 24 HOURS
COMMUNITY
Start: 2024-09-25

## 2024-10-18 ASSESSMENT — PAIN SCALES - GENERAL: PAINLEVEL: NO PAIN (0)

## 2024-10-18 NOTE — PROGRESS NOTES
Preventive Care Visit  [facility]  Aravind Villa MD, Family Medicine  Oct 18, 2024      Assessment & Plan     Medicare annual wellness visit, subsequent  Annual Wellness Visit completed.  Risk questionaire reviewed in detail.  Appropriate preventive services were discussed with this patient, including applicable screening as appropriate for fall prevention, nutrition, physical activity, tobacco-use cessation, weight loss, and cognition.  Annual Wellness Visits recommended to continue.     Class 1 obesity without serious comorbidity with body mass index (BMI) of 33.0 to 33.9 in adult, unspecified obesity type  Dietary and exercise modification for weight goal less than 220 pounds initially, less than 210 pounds ideally.    Coronary artery disease of native artery of native heart with stable angina pectoris (H)  CAD historically status post percutaneous intervention with drug-eluting stent x 1 to mid RCA secondary to 90% stenosis.  Continues dual antiplatelet therapy with low-dose aspirin and clopidogrel 75 mg daily and will follow-up with cardiology in February 2025 anticipated.    S/P coronary artery stent placement  As above.    Essential hypertension, benign  Amlodipine 10 mg daily with blood pressure 108/74 on recheck.  - amLODIPine (NORVASC) 10 MG tablet  Dispense: 90 tablet; Refill: 3  - Comprehensive metabolic panel  - Comprehensive metabolic panel    Hyperlipidemia LDL goal <100  Check lipid cascade today while fasting.  Continues rosuvastatin 20 mg daily.  - Lipid panel reflex to direct LDL Fasting  - Lipid panel reflex to direct LDL Fasting    Serrated adenoma of colon  History of colonic polyp noted he did have colonoscopy June 8, 2023 with 5-year follow-up recommendation.  - CBC with platelets  - CBC with platelets    BPH with urinary obstruction  BPH with urinary obstruction despite tamsulosin 0.8 mg daily and will refer patient to see urologist.  - Adult Urology   "Referral    Screening for prostate cancer  PSA for prostate cancer screening purposes based on age criteria.  - Prostate Specific Antigen Screen  - Prostate Specific Antigen Screen    Chronic right-sided low back pain without sciatica  Chronic right-sided low back pain without sciatica.  Work comp related injury.  Follows with pain specialist.      CAD historically status post right coronary artery stenting February 2024 as noted. Doing well. Saw Dr. Prajapati in follow-up April 4, 2024. Continues dual antiplatelet therapy with low-dose aspirin and clopidogrel.       Patient has been advised of split billing requirements and indicates understanding: Yes        BMI  Estimated body mass index is 33.78 kg/m  as calculated from the following:    Height as of this encounter: 1.765 m (5' 9.49\").    Weight as of this encounter: 105.2 kg (232 lb).   Weight management plan: Discussed healthy diet and exercise guidelines Ensure ongoing efforts to achieve weight goal < 220 pounds initially, < 210 pounds ideally.       Counseling  Appropriate preventive services were addressed with this patient via screening, questionnaire, or discussion as appropriate for fall prevention, nutrition, physical activity, Tobacco-use cessation, social engagement, weight loss and cognition.  Checklist reviewing preventive services available has been given to the patient.  Reviewed patient's diet, addressing concerns and/or questions.   Patient reported safety concerns were addressed today.The patient was provided with written information regarding signs of hearing loss.   Information on urinary incontinence and treatment options given to patient.   I have reviewed Opioid Use Disorder and Substance Use Disorder risk factors and made any needed referrals.           Angel Lantigua is a 69 year old, presenting for the following:  Physical        10/18/2024    12:43 PM   Additional Questions   Roomed by [facility] Directive  Patient " "does not have a Health Care Directive or Living Will: Patient states has Advance Directive and will bring in a copy to clinic.    HPI    Patient seen today for annual wellness visit.  In general doing well.   Patient status post percutaneous intervention mid RCA secondary to 90% stenosis with drug-eluting stent x 1 completed 2024.  Paroxysmal LAD 50% stenosis noted.  Aggressive cardiovascular risk factor reduction.  On dual antiplatelet therapy with low-dose aspirin and clopidogrel.  Had increase amlodipine from 5 mg up to 10 mg daily in the past for hypertension management.  No ankle swelling.  No side effects.  Switch from pravastatin to rosuvastatin for cholesterol management.  Continues tamsulosin 0.8 mg daily for BPH with urinary obstruction.  JIGNA but did not tolerate CPAP well in the past.  Has lost 30 pounds with weight watchers, etc., since weight max of 262 pounds.  Chronic right sided lower back pain without sciatica described as work comp injury after fall in  with reinjury in 2018.  Utilizing pantoprazole 40 mg once daily instead of twice daily.  Using amitriptyline 25 mg at bedtime as well per Dr. Florian Henry gastroenterology due to persistent left anterolateral chest discomfort described despite negative follow-up cardiac workup.  Comprehensive review of systems as above otherwise all negative.          - Lovely   3 children - 2 sons, 1 daughter   5 grandchildren   Quit smoking ~ 20 years   Quit 19 - prior chew 1 tin per 3 days   EtOH: occ   Mom -  84 kidney failure   Dad -  86 \"wore out\"   2 bros -   1 sis -   Surgeries: knee arthroscopy bilateral; left inguinal hernia; laparoscopic sigmoid colectomy 19; left TKA (Dr. Ramos with [facility]) on 22; PCI of mid RCA 90% stenosis with KATERINA x 1 (2024).  Moderate proximal LAD 50% stenosis is not flow-limiting by invasive flow wire;   Radiofrequency ablation procedure x 3 for lower back pain   Hospitalizations: " 12/5/19 through Feb, 2020 (3 total admissions required) due to GI concerns   Work: retired as beer distributor (L1-L4 degenerative back issues) - oxycodone if walk, etc.   Hobbies: swim 5x/week, fishing;                 10/15/2024   General Health   How would you rate your overall physical health? (!) FAIR   Feel stress (tense, anxious, or unable to sleep) Not at all            10/15/2024   Nutrition   Diet: Regular (no restrictions)            10/15/2024   Exercise   Days per week of moderate/strenous exercise 4 days   Average minutes spent exercising at this level 60 min            10/15/2024   Social Factors   Frequency of gathering with friends or relatives Three times a week   Worry food won't last until get money to buy more No   Food not last or not have enough money for food? No   Do you have housing? (Housing is defined as stable permanent housing and does not include staying ouside in a car, in a tent, in an abandoned building, in an overnight shelter, or couch-surfing.) Yes   Are you worried about losing your housing? No   Lack of transportation? No   Unable to get utilities (heat,electricity)? No            10/18/2024   Fall Risk   Fallen 2 or more times in the past year? No   Trouble with walking or balance? No             10/15/2024   Activities of Daily Living- Home Safety   Needs help with the following daily activites None of the above   Safety concerns in the home Throw rugs in the hallway            10/15/2024   Dental   Dentist two times every year? Yes            10/15/2024   Hearing Screening   Hearing concerns? (!) I NEED TO ASK PEOPLE TO SPEAK UP OR REPEAT THEMSELVES.            10/15/2024   Driving Risk Screening   Patient/family members have concerns about driving No            10/15/2024   General Alertness/Fatigue Screening   Have you been more tired than usual lately? No            10/15/2024   Urinary Incontinence Screening   Bothered by leaking urine in past 6 months Yes             10/15/2024   TB Screening   Were you born outside of the [place]? No              Today's PHQ-2 Score:       2024     7:19 AM   PHQ-2 (  [employer])   Q1: Little interest or pleasure in doing things 0   Q2: Feeling down, depressed or hopeless 0   PHQ-2 Score 0         10/15/2024   Substance Use   Alcohol more than 3/day or more than 7/wk No   Do you have a current opioid prescription? (!) YES   How severe/bad is pain from 1 to 10? 3/10   Do you use any other substances recreationally? (!) PRESCRIPTION DRUGS             No data to display              Low Risk (0-3)  Moderate Risk (4-7)  High Risk (>8)  Social History     Tobacco Use    Smoking status: Former     Current packs/day: 0.00     Types: Cigarettes     Quit date:      Years since quittin.8    Smokeless tobacco: Former     Types: Chew     Quit date:     Tobacco comments:     chew daily   Vaping Use    Vaping status: Never Used   Substance Use Topics    Alcohol use: Yes     Comment: Alcoholic Drinks/day: occasional    Drug use: Yes     Comment: Drug use: medical cannabis       Last PSA:   Prostate Specific Antigen Screen   Date Value Ref Range Status   10/17/2023 0.64 0.00 - 4.50 ng/mL Final   10/08/2021 0.67 0.00 - 4.50 ug/L Final     ASCVD Risk   The 10-year ASCVD risk score (Aram PERES, et al., 2019) is: 11.3%    Values used to calculate the score:      Age: 69 years      Sex: Male      Is Non- : No      Diabetic: No      Tobacco smoker: No      Systolic Blood Pressure: 108 mmHg      Is BP treated: Yes      HDL Cholesterol: 47 mg/dL      Total Cholesterol: 154 mg/dL    Fracture Risk Assessment Tool  Link to Frax Calculator  Use the information below to complete the Frax calculator  : 1955  Sex: male  Weight (kg): 105.2 kg (actual weight)  Height (cm): 176.5 cm  Previous Fragility Fracture:  No  History of parent with fractured hip:  No  Current Smoking:  No  Patient has been on glucocorticoids for more  than 3 months (5mg/day or more): No  Rheumatoid Arthritis on Problem List:  No  Secondary Osteoporosis on Problem List:  No  Consumes 3 or more units of alcohol per day: No  Femoral Neck BMD (g/cm2)            Reviewed and updated as needed this visit by Provider                    Past Medical History:   Diagnosis Date    GALINDO (acute kidney injury) (H)     BPH (benign prostatic hyperplasia)     Clostridium difficile colitis     h/o 10/19    Diverticulitis     GERD (gastroesophageal reflux disease)     H/O Bell's palsy     Hematuria     Hyperbilirubinemia     Hyperlipidemia     Hypertension     Insomnia     Lower back pain     Right inguinal hernia     Sleep apnea     Minor Sleep Apnea not CPAP     Past Surgical History:   Procedure Laterality Date    CV CORONARY ANGIOGRAM N/A 2/12/2024    Procedure: Coronary Angiogram;  Surgeon: Merrick Hoyos MD;  Location: [facility]    CV INSTANTANEOUS WAVE-FREE RATIO N/A 2/12/2024    Procedure: Instantaneous Wave-Free Ratio;  Surgeon: Merrick Hoyos MD;  Location: [facility]    CV LEFT HEART CATH N/A 2/12/2024    Procedure: Left Heart Catheterization;  Surgeon: Merrick Hoyos MD;  Location: [facility]    CV PCI STENT DRUG ELUTING N/A 2/12/2024    Procedure: Percutaneous Coronary Intervention Stent;  Surgeon: Merrick Hoyos MD;  Location: [facility]    HC KNEE SCOPE,MED/LAT MENISCUS REPAIR      Description: Knee Arthroscopy With Medial Meniscus Repair;  Recorded: 02/11/2009;    HERNIA REPAIR      NY ESOPHAGOGASTRODUODENOSCOPY TRANSORAL DIAGNOSTIC N/A 12/26/2019    Procedure: ESOPHAGOGASTRODUODENOSCOPY (EGD) with biopsies;  Surgeon: Tamir Serna MD;  Location: [facility] GI;  Service: Gastroenterology    NY LAP,SURG,COLECTOMY, PARTIAL, W/ANAST N/A 12/5/2019    Procedure: LAPAROSCOPIC sIGMOIDECTOMY;  Surgeon: Isi Lyles MD;  Location: [facility] OR;  Service: General    SIGMOIDOSCOPY FLEXIBLE N/A 12/24/2019     Procedure: Flexible SIGMOIDOSCOPY;  Surgeon: Isi Lyles MD;  Location: [facility];  Service: Gastroenterology    SIGMOIDOSCOPY FLEXIBLE N/A 12/27/2019    Procedure: Flexible SIGMOIDOSCOPY with biopsy;  Surgeon: Isi Lyles MD;  Location: [facility];  Service: Gastroenterology     Lab work is in process  Labs reviewed in EPIC  BP Readings from Last 3 Encounters:   10/18/24 108/74   05/14/24 133/79   04/22/24 (!) 142/88    Wt Readings from Last 3 Encounters:   10/18/24 105.2 kg (232 lb)   04/22/24 111.8 kg (246 lb 8 oz)   04/17/24 112 kg (247 lb)                  Patient Active Problem List   Diagnosis    Smokeless tobacco use    Hyperlipidemia LDL goal <100    Cerumen Impaction    Lower Back Pain    Diverticulosis    Hematuria    JIGNA (obstructive sleep apnea)    Benign Essential Hypertension    Insomnia    Colon polyp    History of Bell's palsy    Hyperbilirubinemia    Acute diverticulitis    Non-recurrent unilateral inguinal hernia without obstruction or gangrene    Diverticulitis    Nausea and vomiting, intractability of vomiting not specified, unspecified vomiting type    Recurrent Clostridioides difficile diarrhea    Abdominal cramping    Emesis    Obesity (BMI 35.0-39.9) with comorbidity (H)    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 37.0 to 37.9 in adult    Serrated adenoma of colon    BPH with urinary obstruction    Urinary incontinence, unspecified type    S/P coronary artery stent placement    Mixed hyperlipidemia    Primary hypertension    Status post coronary angiogram    Coronary artery calcification seen on CT scan    Coronary artery disease of native artery of native heart with stable angina pectoris (H)     Past Surgical History:   Procedure Laterality Date    CV CORONARY ANGIOGRAM N/A 2/12/2024    Procedure: Coronary Angiogram;  Surgeon: Merrick Hoyos MD;  Location: [facility] CATH LAB CV    CV INSTANTANEOUS WAVE-FREE RATIO  N/A 2024    Procedure: Instantaneous Wave-Free Ratio;  Surgeon: Merrick Hoyos MD;  Location: [facility] CV    CV LEFT HEART CATH N/A 2024    Procedure: Left Heart Catheterization;  Surgeon: Merrick Hoyos MD;  Location: [facility] CV    CV PCI STENT DRUG ELUTING N/A 2024    Procedure: Percutaneous Coronary Intervention Stent;  Surgeon: Merrick Hoyos MD;  Location: [facility]    HC KNEE SCOPE,MED/LAT MENISCUS REPAIR      Description: Knee Arthroscopy With Medial Meniscus Repair;  Recorded: 2009;    HERNIA REPAIR      CA ESOPHAGOGASTRODUODENOSCOPY TRANSORAL DIAGNOSTIC N/A 2019    Procedure: ESOPHAGOGASTRODUODENOSCOPY (EGD) with biopsies;  Surgeon: Tamir Serna MD;  Location: [facility];  Service: Gastroenterology    CA LAP,SURG,COLECTOMY, PARTIAL, W/ANAST N/A 2019    Procedure: LAPAROSCOPIC sIGMOIDECTOMY;  Surgeon: Isi Lyles MD;  Location: [facility] OR;  Service: General    SIGMOIDOSCOPY FLEXIBLE N/A 2019    Procedure: Flexible SIGMOIDOSCOPY;  Surgeon: Isi Lyles MD;  Location: [facility];  Service: Gastroenterology    SIGMOIDOSCOPY FLEXIBLE N/A 2019    Procedure: Flexible SIGMOIDOSCOPY with biopsy;  Surgeon: Isi Lyles MD;  Location: [facility];  Service: Gastroenterology       Social History     Tobacco Use    Smoking status: Former     Current packs/day: 0.00     Types: Cigarettes     Quit date:      Years since quittin.8    Smokeless tobacco: Former     Types: Chew     Quit date:     Tobacco comments:     chew daily   Substance Use Topics    Alcohol use: Yes     Comment: Alcoholic Drinks/day: occasional     No family history on file.      Current Outpatient Medications   Medication Sig Dispense Refill    amitriptyline (ELAVIL) 25 MG tablet Take by mouth every 24 hours.      amLODIPine (NORVASC) 10 MG tablet Take 1 tablet (10 mg) by mouth daily. 90 tablet 3    aspirin 81 MG  EC tablet Take 1 tablet (81 mg) by mouth daily Start tomorrow. 30 tablet 3    clopidogrel (PLAVIX) 75 MG tablet Take 1 tablet (75 mg) by mouth daily 90 tablet 3    fish oil-omega-3 fatty acids 1000 MG capsule Take 2 g by mouth daily      medical cannabis (Patient's own supply) by Other route See Admin Instructions      methylcellulose (CITRUCEL) 500 MG TABS tablet Take by mouth every 24 hours.      multivitamin (ONE A DAY) per tablet Take 1 tablet by mouth daily      pantoprazole (PROTONIX) 40 MG EC tablet Take 1 tablet (40 mg) by mouth 2 times daily 60 tablet 3    rosuvastatin (CRESTOR) 20 MG tablet Take 1 tablet (20 mg) by mouth daily 90 tablet 3    tamsulosin (FLOMAX) 0.4 MG capsule TAKE 2 CAPSULES(0.8 MG) BY MOUTH DAILY 180 capsule 1    triamcinolone (KENALOG) 0.1 % external ointment APPLY TO AFFECTED AREAS TWICE DAILY. TAPER WITH IMPROVEMENT      amoxicillin (AMOXIL) 500 MG tablet TAKE 4 TABLETS BY MOUTH 1 HOUR BEFORE DENTAL APPOINTMENT       Allergies   Allergen Reactions    Atorvastatin Calcium [Atorvastatin] Dizziness     Current providers sharing in care for this patient include:  Patient Care Team:  Aravind Villa MD as PCP - General (Family Practice)  Aravind Villa MD as Assigned PCP  Maurice Oconnell MD as MD (Cardiovascular Disease)  Dino Prajapati DO as Assigned Heart and Vascular Provider    The following health maintenance items are reviewed in Epic and correct as of today:  Health Maintenance   Topic Date Due    MEDICARE ANNUAL WELLNESS VISIT  10/17/2024    LIPID  04/17/2025    BMP  04/22/2025    ANNUAL REVIEW OF HM ORDERS  10/18/2025    FALL RISK ASSESSMENT  10/18/2025    GLUCOSE  04/22/2027    COLORECTAL CANCER SCREENING  06/08/2028    ADVANCE CARE PLANNING  10/18/2029    DTAP/TDAP/TD IMMUNIZATION (3 - Td or Tdap) 10/13/2032    HEPATITIS C SCREENING  Completed    PHQ-2 (once per calendar year)  Completed    INFLUENZA VACCINE  Completed    Pneumococcal Vaccine: 65+ Years   "Completed    ZOSTER IMMUNIZATION  Completed    RSV VACCINE  Completed    AORTIC ANEURYSM SCREENING (SYSTEM ASSIGNED)  Completed    COVID-19 Vaccine  Completed    HPV IMMUNIZATION  Aged Out    MENINGITIS IMMUNIZATION  Aged Out    RSV MONOCLONAL ANTIBODY  Aged Out    LUNG CANCER SCREENING  Discontinued         Review of Systems  Constitutional, HEENT, cardiovascular, pulmonary, GI, , musculoskeletal, neuro, skin, endocrine and psych systems are negative, except as otherwise noted.     Objective    Exam  /74   Pulse 95   Temp 98.7  F (37.1  C) (Temporal)   Resp 14   Ht 1.765 m (5' 9.49\")   Wt 105.2 kg (232 lb)   SpO2 95%   BMI 33.78 kg/m     Estimated body mass index is 33.78 kg/m  as calculated from the following:    Height as of this encounter: 1.765 m (5' 9.49\").    Weight as of this encounter: 105.2 kg (232 lb).    Physical Exam  GENERAL: alert and no distress  EYES: Eyes grossly normal to inspection, PERRL and conjunctivae and sclerae normal  HENT: ear canals and TM's normal, nose and mouth without ulcers or lesions  NECK: no adenopathy, no asymmetry, masses, or scars  RESP: lungs clear to auscultation - no rales, rhonchi or wheezes  CV: regular rate and rhythm, normal S1 S2, no S3 or S4, no murmur, click or rub, no peripheral edema  ABDOMEN: soft, nontender, no hepatosplenomegaly, no masses and bowel sounds normal   (male): normal male genitalia without lesions or urethral discharge, no hernia  RECTAL: normal sphincter tone, no rectal masses, prostate normal size, smooth, nontender without nodules or masses  MS: no gross musculoskeletal defects noted, no edema  SKIN: no suspicious lesions or rashes  NEURO: Normal strength and tone, mentation intact and speech normal  PSYCH: mentation appears normal, affect normal/bright         10/18/2024   Mini Cog   Clock Draw Score 2 Normal   3 Item Recall 2 objects recalled   Mini Cog Total Score 4                 Signed Electronically by: Aravind Villa, " MD

## 2024-10-19 LAB
ALBUMIN SERPL BCG-MCNC: 4.7 G/DL (ref 3.5–5.2)
ALP SERPL-CCNC: 64 U/L (ref 40–150)
ALT SERPL W P-5'-P-CCNC: 20 U/L (ref 0–70)
ANION GAP SERPL CALCULATED.3IONS-SCNC: 9 MMOL/L (ref 7–15)
AST SERPL W P-5'-P-CCNC: 25 U/L (ref 0–45)
BILIRUB SERPL-MCNC: 0.9 MG/DL
BUN SERPL-MCNC: 20.8 MG/DL (ref 8–23)
CALCIUM SERPL-MCNC: 9.9 MG/DL (ref 8.8–10.4)
CHLORIDE SERPL-SCNC: 101 MMOL/L (ref 98–107)
CHOLEST SERPL-MCNC: 169 MG/DL
CREAT SERPL-MCNC: 1.13 MG/DL (ref 0.67–1.17)
EGFRCR SERPLBLD CKD-EPI 2021: 71 ML/MIN/1.73M2
FASTING STATUS PATIENT QL REPORTED: YES
FASTING STATUS PATIENT QL REPORTED: YES
GLUCOSE SERPL-MCNC: 96 MG/DL (ref 70–99)
HCO3 SERPL-SCNC: 27 MMOL/L (ref 22–29)
HDLC SERPL-MCNC: 56 MG/DL
LDLC SERPL CALC-MCNC: 87 MG/DL
NONHDLC SERPL-MCNC: 113 MG/DL
POTASSIUM SERPL-SCNC: 4.6 MMOL/L (ref 3.4–5.3)
PROT SERPL-MCNC: 7.8 G/DL (ref 6.4–8.3)
PSA SERPL DL<=0.01 NG/ML-MCNC: 0.94 NG/ML (ref 0–4.5)
SODIUM SERPL-SCNC: 137 MMOL/L (ref 135–145)
TRIGL SERPL-MCNC: 128 MG/DL

## 2024-11-12 ENCOUNTER — VIRTUAL VISIT (OUTPATIENT)
Dept: UROLOGY | Facility: CLINIC | Age: 69
End: 2024-11-12
Attending: FAMILY MEDICINE
Payer: COMMERCIAL

## 2024-11-12 DIAGNOSIS — N40.1 BPH WITH URINARY OBSTRUCTION: Primary | ICD-10-CM

## 2024-11-12 DIAGNOSIS — N13.8 BPH WITH URINARY OBSTRUCTION: Primary | ICD-10-CM

## 2024-11-12 PROCEDURE — 99203 OFFICE O/P NEW LOW 30 MIN: CPT | Mod: 95 | Performed by: NURSE PRACTITIONER

## 2024-11-12 RX ORDER — TADALAFIL 5 MG/1
5 TABLET ORAL EVERY 24 HOURS
Qty: 30 TABLET | Refills: 3 | Status: SHIPPED | OUTPATIENT
Start: 2024-11-12 | End: 2024-11-15

## 2024-11-12 ASSESSMENT — PAIN SCALES - GENERAL: PAINLEVEL_OUTOF10: NO PAIN (0)

## 2024-11-12 NOTE — PROGRESS NOTES
UROLOGY VIDEO OFFICE VISIT     Video-Visit Details    Type of service:  Video Visit    Video Start Time (time video started): 1430    Video End Time (time video stopped): 1449    Originating Location (pt. Location): Home    Distant Location (provider location):  Off-site    Mode of Communication:  Video Conference via [employer]          Assessment and Plan:     Assessment: 69 year old male with BPH with LUTS and microscopic hematuria.     Plan:  -Discussed the diagnosis and natural history of benign prostatic hyperplasia and lower urinary tract symptoms. We discussed treatment options including observation vs. medical therapy vs. surgical intervention. He would like to be evaluated for a outlet procedure. Will schedule patient for cystoscopy to evaluate for outlet procedure.   -Discussed option of adding finasteride or tadalafil to tamsulosin to help with BPH with LUTS. He would like to try tadalafil. Discussed r/b/a of medication. -Discussed the need to alert EMS if chest pain or significant vision or hearing change or loss, erection lasting > 4h.  -Will refer to Dr. Purvis, Dr. Sheriff, or Dr. Cordell Raygoza, CNP  Department of Urology   November 12, 2024    I spent a total of 30 minutes spent on the date of the encounter doing chart review, history and exam, documentation, and further activities as noted above.          Chief Complaint:   LUTS         History of Present Illness:    Jay Roth is a pleasant 69 year old male with concerns of urinary urgency with incontinence.     Mr. Roth notes obstructive urinary symptoms of decreased force of stream, post-void dribbling, and occasional feelings of incomplete bladder emptying. Irritative symptoms include urgency with urge incontinence. He notes most bothersome symptoms is UUI.     He is currently on tamsulosin 0.4mg PO daily and hasn't notice any effect on his urinary symptoms.     Fluid intake consists of water and coffee. Occasionally  will have a beer or diet soda.     At times he can void up to every hour during the day and about 3-4x/night. Current voiding habits are about every 2-3 hours and 2-3x/night.     History of microscopic hematuria. Notes a cystoscopy in 2019 which was negative per patient.     The patient denies a family history of bladder, kidney, or prostate malignancies.  The patient is a past smoker of about 30 years about 2-3ppd.  The patient denies exposure to chemicals/dyes, chronic delacruz, urolithiasis, recurrent UTI, chemotherapy, or pelvic radiation.           Past Medical History:     Past Medical History:   Diagnosis Date    GALINDO (acute kidney injury) (H)     BPH (benign prostatic hyperplasia)     Clostridium difficile colitis     h/o 10/19    Diverticulitis     GERD (gastroesophageal reflux disease)     H/O Bell's palsy     Hematuria     Hyperbilirubinemia     Hyperlipidemia     Hypertension     Insomnia     Lower back pain     Right inguinal hernia     Sleep apnea     Minor Sleep Apnea not CPAP            Past Surgical History:     Past Surgical History:   Procedure Laterality Date    CV CORONARY ANGIOGRAM N/A 2/12/2024    Procedure: Coronary Angiogram;  Surgeon: Merrick Hoyos MD;  Location: [facility]    CV INSTANTANEOUS WAVE-FREE RATIO N/A 2/12/2024    Procedure: Instantaneous Wave-Free Ratio;  Surgeon: Merrick Hoyos MD;  Location: [facility]    CV LEFT HEART CATH N/A 2/12/2024    Procedure: Left Heart Catheterization;  Surgeon: Merrick Hoyos MD;  Location: [facility]    CV PCI STENT DRUG ELUTING N/A 2/12/2024    Procedure: Percutaneous Coronary Intervention Stent;  Surgeon: Merrick Hoyos MD;  Location: [facility]    HC KNEE SCOPE,MED/LAT MENISCUS REPAIR      Description: Knee Arthroscopy With Medial Meniscus Repair;  Recorded: 02/11/2009;    HERNIA REPAIR      PA ESOPHAGOGASTRODUODENOSCOPY TRANSORAL DIAGNOSTIC N/A 12/26/2019    Procedure: ESOPHAGOGASTRODUODENOSCOPY  (EGD) with biopsies;  Surgeon: Tamir Serna MD;  Location: [facility];  Service: Gastroenterology    ME LAP,SURG,COLECTOMY, PARTIAL, W/ANAST N/A 12/5/2019    Procedure: LAPAROSCOPIC sIGMOIDECTOMY;  Surgeon: Isi Lyles MD;  Location: [facility] Main OR;  Service: General    SIGMOIDOSCOPY FLEXIBLE N/A 12/24/2019    Procedure: Flexible SIGMOIDOSCOPY;  Surgeon: Isi Lyles MD;  Location: [facility];  Service: Gastroenterology    SIGMOIDOSCOPY FLEXIBLE N/A 12/27/2019    Procedure: Flexible SIGMOIDOSCOPY with biopsy;  Surgeon: Isi Lyles MD;  Location: [facility];  Service: Gastroenterology            Medications     Current Outpatient Medications   Medication Sig Dispense Refill    amitriptyline (ELAVIL) 25 MG tablet Take by mouth every 24 hours.      amLODIPine (NORVASC) 10 MG tablet Take 1 tablet (10 mg) by mouth daily. 90 tablet 3    aspirin 81 MG EC tablet Take 1 tablet (81 mg) by mouth daily Start tomorrow. 30 tablet 3    clopidogrel (PLAVIX) 75 MG tablet Take 1 tablet (75 mg) by mouth daily 90 tablet 3    fish oil-omega-3 fatty acids 1000 MG capsule Take 2 g by mouth daily      medical cannabis (Patient's own supply) by Other route See Admin Instructions      methylcellulose (CITRUCEL) 500 MG TABS tablet Take by mouth every 24 hours.      multivitamin (ONE A DAY) per tablet Take 1 tablet by mouth daily      pantoprazole (PROTONIX) 40 MG EC tablet Take 1 tablet (40 mg) by mouth daily.      rosuvastatin (CRESTOR) 20 MG tablet Take 1 tablet (20 mg) by mouth daily 90 tablet 3    tamsulosin (FLOMAX) 0.4 MG capsule TAKE 2 CAPSULES(0.8 MG) BY MOUTH DAILY 180 capsule 1    triamcinolone (KENALOG) 0.1 % external ointment APPLY TO AFFECTED AREAS TWICE DAILY. TAPER WITH IMPROVEMENT       No current facility-administered medications for this visit.            Allergies:   Atorvastatin calcium [atorvastatin]         Review of Systems:  From intake questionnaire   Negative 14 system  review except as noted on HPI, nurse's note.         Physical Exam:     General Appearance: Well groomed, hygenic  Eyes: No redness, discharge  Respiratory: No cough, no respiratory distress or labored breathing  Musculoskeletal: Grossly normal, full range of motion in upper extremities, no gross deficits  Skin: No discoloration or apparent rashes  Neurologic - No tremors  Psychiatric - Alert and oriented  The rest of a comprehensive physical examination is deferred due to video visit restrictions        Labs and Pathology:    I personally reviewed all applicable laboratory data and went over findings with patient  Significant for:    CBC RESULTS:  Recent Labs   Lab Test 10/18/24  1337 04/17/24  0812 02/09/24  0755 12/15/23  1213   WBC 5.9 5.2 5.8 6.1   HGB 17.1 16.6 16.9 16.9    177 203 188        BMP RESULTS:  Recent Labs   Lab Test 10/18/24  1337 04/22/24  1219 04/17/24  0812 02/09/24  0755 10/08/21  0802 05/24/21  1303 03/18/21  0000 11/09/20  1446 10/06/20  0848    136 134* 140   < > 138  --  137 140   POTASSIUM 4.6 4.6 4.7 4.2   < > 4.4  --  4.1 4.8   CHLORIDE 101 100 99 102   < > 102  --  101 103   CO2 27 28 26 30*   < > 22  --  25 29   ANIONGAP 9 8 9 8   < > 14  --  11 8   GLC 96 97 98 83   < > 95  --  155* 86   BUN 20.8 20.2 16.4 17.2   < > 20  --  21 19   CR 1.13 1.22* 1.23* 1.04   < > 1.21 1.29 1.11 1.17   GFRESTIMATED 71 65 65 79   < > >60 60* >60 >60   GFRESTBLACK  --   --   --   --   --  >60 >60 >60 >60   JAYME 9.9 9.7 9.3 9.1   < > 9.2  --  9.6 9.1    < > = values in this interval not displayed.       UA RESULTS:   Recent Labs   Lab Test 04/22/24  1129 02/20/23  1350 10/08/21  0802   SG 1.015 1.025 >=1.030   URINEPH 6.5 7.0 5.5   NITRITE Negative Negative Negative   RBCU 0-2 5-10* 0-2   WBCU None Seen 0-5 None Seen       PSA RESULTS  Prostate Specific Antigen Screen   Date Value Ref Range Status   10/18/2024 0.94 0.00 - 4.50 ng/mL Final   10/17/2023 0.64 0.00 - 4.50 ng/mL Final   10/14/2022  0.55 0.00 - 4.50 ng/mL Final   10/08/2021 0.67 0.00 - 4.50 ug/L Final   10/06/2020 0.7 0.0 - 4.5 ng/mL Final   09/26/2019 0.6 0.0 - 4.5 ng/mL Final   06/04/2012 0.4 <3.6 ng/mL Final     Comment:     Method is Abbott Prostate-Specific Antigen (PSA)            Standard-WHO 1st International (90:10) as of 09/26/05 04/28/2011 0.3 <3.6 ng/mL Final     Comment:     Method is Abbott Prostate-Specific Antigen (PSA)            Standard-WHO 1st International (90:10) as of 09/26/05 04/22/2010 0.3 <3.6 ng/mL Final     Comment:     Method is Abbott Prostate-Specific Antigen (PSA)            Standard-WHO 1st International (90:10) as of 09/26/05             Imaging:    I personally reviewed all applicable imaging and went over findings with patient.  Significant for:    Results for orders placed or performed during the hospital encounter of 05/07/24   CT Abdomen Pelvis w Contrast    Narrative    EXAM: CT ABDOMEN PELVIS W CONTRAST  LOCATION: [facility]  DATE: 5/7/2024    INDICATION:  LUQ abdominal pain  COMPARISON: 12/20/2023  TECHNIQUE: CT scan of the abdomen and pelvis was performed following injection of IV contrast. Multiplanar reformats were obtained. Dose reduction techniques were used.  CONTRAST: Isovue 370 100 mL    FINDINGS:   LOWER CHEST: Normal.    HEPATOBILIARY: No significant mass or bile duct dilatation. No calcified gallstones.     PANCREAS: No significant mass, duct dilatation, or inflammatory change.    SPLEEN: Normal.    ADRENAL GLANDS: Normal.    KIDNEYS/BLADDER: No significant mass, stones, or hydronephrosis. There is a right simple or benign cyst. No follow up is needed.    BOWEL: Nonspecific nonobstructive bowel gas pattern. Appendix is within normal limits. Colonic diverticula without evidence for diverticulitis. Postoperative changes of colonic anastomosis in the mid sigmoid colon.    LYMPH NODES: No lymphadenopathy.    VASCULATURE: No abdominal aortic aneurysm. Moderate  vascular calcifications abdominal aorta and common iliac arteries.    PELVIC ORGANS: Mild prostatic enlargement.    MUSCULOSKELETAL: Mild to moderate spondylosis. Right inguinal hernia containing fat with some mild stranding within the fat. Clinical correlation for any related symptomatology. No ventral hernias.      Impression    IMPRESSION:   1.  Right inguinal hernia containing fat with some mild stranding within the fat. No ventral hernias.  2.  Colonic diverticula without evidence for diverticulitis. Postoperative changes of colonic anastomosis in the mid sigmoid colon.  3.  Moderate vascular calcifications.  4.  Mild prostatic enlargement.

## 2024-11-12 NOTE — PROGRESS NOTES
Virtual Visit Details    Type of service:  Video Visit   Video Start Time: {video visit start/end time for provider to select:329431}  Video End Time:{video visit start/end time for provider to select:365835}    Originating Location (pt. Location): Home  {PROVIDER LOCATION On-site should be selected for visits conducted from your clinic location or adjoining [facility] hospital, academic office, or other nearby [facility] building. Off-site should be selected for all other provider locations, including home:[id number]}  Distant Location (provider location):  On-site  Platform used for Video Visit: Ausra

## 2024-11-12 NOTE — PATIENT INSTRUCTIONS
UROLOGY CLINIC VISIT PATIENT INSTRUCTIONS    -Will start on tadalafil 5mg daily. Discussed the need to alert EMS if chest pain or significant vision or hearing change or loss, erection lasting > 4h.     If you have any issues, questions or concerns in the meantime, do not hesitate to contact us at [facility] at [phone] or via [facility].     Ne Raygoza CNP  Department of Urology

## 2024-11-12 NOTE — NURSING NOTE
Is the patient currently in the state of [place]? YES    Visit mode:VIDEO    If the visit is dropped, the patient can be reconnected by: VIDEO VISIT: Send to e-mail at: [email or web address]    Will anyone else be joining the visit? NO  (If patient encounters technical issues they should call [phone])    How would you like to obtain your AVS? MyChart    Are changes needed to the allergy or medication list? No    Are refills needed on medications prescribed by this physician? NO    Reason for visit: Consult    Faith GLORIA

## 2024-11-13 ENCOUNTER — TELEPHONE (OUTPATIENT)
Dept: UROLOGY | Facility: CLINIC | Age: 69
End: 2024-11-13
Payer: COMMERCIAL

## 2024-11-14 ENCOUNTER — TRANSFERRED RECORDS (OUTPATIENT)
Dept: HEALTH INFORMATION MANAGEMENT | Facility: CLINIC | Age: 69
End: 2024-11-14
Payer: COMMERCIAL

## 2024-11-15 ENCOUNTER — TELEPHONE (OUTPATIENT)
Dept: UROLOGY | Facility: CLINIC | Age: 69
End: 2024-11-15
Payer: COMMERCIAL

## 2024-11-15 DIAGNOSIS — N40.1 BPH WITH URINARY OBSTRUCTION: ICD-10-CM

## 2024-11-15 DIAGNOSIS — N13.8 BPH WITH URINARY OBSTRUCTION: ICD-10-CM

## 2024-11-15 RX ORDER — TADALAFIL 5 MG/1
5 TABLET ORAL EVERY 24 HOURS
Qty: 30 TABLET | Refills: 3 | Status: SHIPPED | OUTPATIENT
Start: 2024-11-15

## 2024-11-15 NOTE — TELEPHONE ENCOUNTER
Spoke with patient regarding denial of PA, will use another avenue to obtain medication.  Veronique Prather RN

## 2024-11-15 NOTE — TELEPHONE ENCOUNTER
[facility] Call Center    Phone Message    May a detailed message be left on voicemail: no     Reason for Call: Other: Patient is calling requesting for a follow up on tadalafil (CIALIS) 5 MG tablet. Please call patient back to discuss.     Action Taken: Other: [facility] Kidney stone inst    Travel Screening: Not Applicable     Date of Service:

## 2024-11-20 ENCOUNTER — APPOINTMENT (OUTPATIENT)
Dept: URBAN - METROPOLITAN AREA CLINIC 260 | Age: 69
Setting detail: DERMATOLOGY
End: 2024-11-20

## 2024-11-20 DIAGNOSIS — L82.0 INFLAMED SEBORRHEIC KERATOSIS: ICD-10-CM

## 2024-11-20 DIAGNOSIS — L82.1 OTHER SEBORRHEIC KERATOSIS: ICD-10-CM

## 2024-11-20 DIAGNOSIS — L20.89 OTHER ATOPIC DERMATITIS: ICD-10-CM

## 2024-11-20 DIAGNOSIS — L72.8 OTHER FOLLICULAR CYSTS OF THE SKIN AND SUBCUTANEOUS TISSUE: ICD-10-CM

## 2024-11-20 DIAGNOSIS — L81.4 OTHER MELANIN HYPERPIGMENTATION: ICD-10-CM

## 2024-11-20 DIAGNOSIS — Z71.89 OTHER SPECIFIED COUNSELING: ICD-10-CM

## 2024-11-20 DIAGNOSIS — D22 MELANOCYTIC NEVI: ICD-10-CM

## 2024-11-20 DIAGNOSIS — L57.0 ACTINIC KERATOSIS: ICD-10-CM

## 2024-11-20 DIAGNOSIS — D18.0 HEMANGIOMA: ICD-10-CM

## 2024-11-20 PROBLEM — D18.01 HEMANGIOMA OF SKIN AND SUBCUTANEOUS TISSUE: Status: ACTIVE | Noted: 2024-11-20

## 2024-11-20 PROBLEM — D22.5 MELANOCYTIC NEVI OF TRUNK: Status: ACTIVE | Noted: 2024-11-20

## 2024-11-20 PROCEDURE — 17000 DESTRUCT PREMALG LESION: CPT | Mod: 59

## 2024-11-20 PROCEDURE — 99213 OFFICE O/P EST LOW 20 MIN: CPT | Mod: 25

## 2024-11-20 PROCEDURE — 17003 DESTRUCT PREMALG LES 2-14: CPT | Mod: 59

## 2024-11-20 PROCEDURE — OTHER LIQUID NITROGEN: OTHER

## 2024-11-20 PROCEDURE — 17110 DESTRUCT B9 LESION 1-14: CPT

## 2024-11-20 PROCEDURE — OTHER COUNSELING: OTHER

## 2024-11-20 PROCEDURE — OTHER MIPS QUALITY: OTHER

## 2024-11-20 PROCEDURE — OTHER ADDITIONAL NOTES: OTHER

## 2024-11-20 ASSESSMENT — LOCATION DETAILED DESCRIPTION DERM
LOCATION DETAILED: LEFT LATERAL EYEBROW
LOCATION DETAILED: LEFT ANTERIOR PROXIMAL THIGH
LOCATION DETAILED: LEFT INFERIOR MEDIAL UPPER BACK
LOCATION DETAILED: RIGHT SUPERIOR FOREHEAD
LOCATION DETAILED: LEFT SUPERIOR FOREHEAD
LOCATION DETAILED: LEFT MEDIAL FRONTAL SCALP
LOCATION DETAILED: LEFT SUPERIOR MEDIAL FOREHEAD
LOCATION DETAILED: LEFT INFERIOR MEDIAL UPPER BACK
LOCATION DETAILED: LEFT DORSAL FOOT
LOCATION DETAILED: RIGHT CENTRAL EYEBROW
LOCATION DETAILED: SUPERIOR LUMBAR SPINE
LOCATION DETAILED: INFERIOR THORACIC SPINE
LOCATION DETAILED: LEFT SUPERIOR PARIETAL SCALP

## 2024-11-20 ASSESSMENT — LOCATION ZONE DERM
LOCATION ZONE: FACE
LOCATION ZONE: TRUNK
LOCATION ZONE: LEG
LOCATION ZONE: SCALP
LOCATION ZONE: TRUNK
LOCATION ZONE: FEET

## 2024-11-20 ASSESSMENT — LOCATION SIMPLE DESCRIPTION DERM
LOCATION SIMPLE: UPPER BACK
LOCATION SIMPLE: LEFT FOOT
LOCATION SIMPLE: LEFT UPPER BACK
LOCATION SIMPLE: LEFT EYEBROW
LOCATION SIMPLE: LEFT THIGH
LOCATION SIMPLE: LOWER BACK
LOCATION SIMPLE: LEFT FOREHEAD
LOCATION SIMPLE: RIGHT EYEBROW
LOCATION SIMPLE: SCALP
LOCATION SIMPLE: LEFT SCALP
LOCATION SIMPLE: LEFT UPPER BACK
LOCATION SIMPLE: RIGHT FOREHEAD

## 2024-11-20 NOTE — PROCEDURE: ADDITIONAL NOTES
Render Risk Assessment In Note?: no
Additional Notes: Pt interested in PDT. Pt will incubate for 3 hours instead of 2. Pt wanted 4 hours, but we compromised on 3. He would like to do scalp and face at the same time. Pt will call insurance company to make sure it is covered. Codes given. Typically insurance doesn't cover two tmts in one day but per patient he has done this before.
Detail Level: Simple
Additional Notes: Pt will use Triamcinolone that he has at home.

## 2024-12-30 ENCOUNTER — VIRTUAL VISIT (OUTPATIENT)
Dept: UROLOGY | Facility: CLINIC | Age: 69
End: 2024-12-30
Payer: COMMERCIAL

## 2024-12-30 DIAGNOSIS — N13.8 BPH WITH URINARY OBSTRUCTION: Primary | ICD-10-CM

## 2024-12-30 DIAGNOSIS — N40.1 BPH WITH URINARY OBSTRUCTION: Primary | ICD-10-CM

## 2024-12-30 PROCEDURE — 99214 OFFICE O/P EST MOD 30 MIN: CPT | Mod: GT | Performed by: UROLOGY

## 2024-12-30 NOTE — PROGRESS NOTES
Virtual Visit Details    Type of service:  Video Visit   Video Start Time: 2:03 PM  Video End Time:2:10 PM    Originating Location (pt. Location): Home    Distant Location (provider location):  On-site  Platform used for Video Visit: Talon    Name: Jay Roth   MRN: [id number]  YOB: 1955    Assessment and Plan:  69 year old male with bothersome lower urinary tract symptoms refractory to medication.     Discussed BPH treatment options in general are, including medications   Minimally invasive surgical techniques, PAE and HoLEP.  With each of these, discussed benefits, side effects, treatment/retreatment rates with regard to patients prostate symptoms and size.     Discussed that the next best step would be office cystoscopy to assess the intraluminal prostatic urethral anatomy to assess what procedure may be of most benefit for him.  Will set this up as an outpatient and provide him further counseling at that time and what procedures may be indicated.     Will have him stop tadalafil given his nausea with the medication.    30 minutes spent on day of encounter with patient, counseling, documenation, orders and coordination of care.      Jose Sheriff MD  December 30, 2024          Chief Complaint: LUTS    History of Present Illness:  Jay Roth is a 69 year old male seen in consultation from regarding LUTS.      Patient has had symptoms for a few years.  Most bothersome symptom is frequency.  No issue  with flow.  Currently, they are voiding spontaneously. They are currently taking an alpha blocker, not taking 5-alpha reductase inhibitor, not taking bladder spasm medication, not taking PDE5 inhibitor for their urination.  Patient is not anticoagulated.    Had severe nausea with tadalafil.      Prostate size 40 ml    BPH related history:  -- Urinary tract infections  -- Urinary tract stones  + Gross hematuria  -- Elevated PSA  -- History of Urinary retention  -- Chronic kidney disease  --  "Prior BPH procedure  -- Prior Prostate biopsy   -- History of prostate cancer  -- Family history of BPH    I reviewed internal records which in summarized above.    I reviewed internal labs, of which pertinent ones include: Hgb   Hemoglobin   Date Value Ref Range Status   10/18/2024 17.1 13.3 - 17.7 g/dL Final   , Cr   Creatinine   Date Value Ref Range Status   10/18/2024 1.13 0.67 - 1.17 mg/dL Final   , PSA   Prostate Specific Antigen Screen   Date Value Ref Range Status   10/18/2024 0.94 0.00 - 4.50 ng/mL Final   10/08/2021 0.67 0.00 - 4.50 ug/L Final   , No results found for: \"UA\", No components found for: \"UC\"         Past Medical History:  Past Medical History:   Diagnosis Date    GALINDO (acute kidney injury) (H)     BPH (benign prostatic hyperplasia)     Clostridium difficile colitis     h/o 10/19    Diverticulitis     GERD (gastroesophageal reflux disease)     H/O Bell's palsy     Hematuria     Hyperbilirubinemia     Hyperlipidemia     Hypertension     Insomnia     Lower back pain     Right inguinal hernia     Sleep apnea     Minor Sleep Apnea not CPAP            Past Surgical History:  Past Surgical History:   Procedure Laterality Date    CV CORONARY ANGIOGRAM N/A 2/12/2024    Procedure: Coronary Angiogram;  Surgeon: Merrick Hoyos MD;  Location: [facility]    CV INSTANTANEOUS WAVE-FREE RATIO N/A 2/12/2024    Procedure: Instantaneous Wave-Free Ratio;  Surgeon: Merrick Hoyos MD;  Location: [facility]    CV LEFT HEART CATH N/A 2/12/2024    Procedure: Left Heart Catheterization;  Surgeon: Merrick Hoyos MD;  Location: [facility]    CV PCI STENT DRUG ELUTING N/A 2/12/2024    Procedure: Percutaneous Coronary Intervention Stent;  Surgeon: Merrick Hoyos MD;  Location: [facility]    HC KNEE SCOPE,MED/LAT MENISCUS REPAIR      Description: Knee Arthroscopy With Medial Meniscus Repair;  Recorded: 02/11/2009;    HERNIA REPAIR      ID ESOPHAGOGASTRODUODENOSCOPY TRANSORAL " DIAGNOSTIC N/A 2019    Procedure: ESOPHAGOGASTRODUODENOSCOPY (EGD) with biopsies;  Surgeon: Tamir Serna MD;  Location: [facility];  Service: Gastroenterology    AR LAP,SURG,COLECTOMY, PARTIAL, W/ANAST N/A 2019    Procedure: LAPAROSCOPIC sIGMOIDECTOMY;  Surgeon: Isi Lyles MD;  Location: [facility] Main OR;  Service: General    SIGMOIDOSCOPY FLEXIBLE N/A 2019    Procedure: Flexible SIGMOIDOSCOPY;  Surgeon: Isi Lyles MD;  Location: [facility];  Service: Gastroenterology    SIGMOIDOSCOPY FLEXIBLE N/A 2019    Procedure: Flexible SIGMOIDOSCOPY with biopsy;  Surgeon: Isi Lyles MD;  Location: [facility];  Service: Gastroenterology            Social History:  Social History     Tobacco Use    Smoking status: Former     Current packs/day: 0.00     Types: Cigarettes     Quit date:      Years since quittin.0    Smokeless tobacco: Former     Types: Chew     Quit date:     Tobacco comments:     chew daily   Vaping Use    Vaping status: Never Used   Substance Use Topics    Alcohol use: Yes     Comment: Alcoholic Drinks/day: occasional    Drug use: Yes     Comment: Drug use: medical cannabis            Family History:  No family history on file.         Allergies:  Allergies   Allergen Reactions    Atorvastatin Calcium [Atorvastatin] Dizziness            Medications:  Current Outpatient Medications   Medication Sig Dispense Refill    amitriptyline (ELAVIL) 25 MG tablet Take by mouth every 24 hours.      amLODIPine (NORVASC) 10 MG tablet Take 1 tablet (10 mg) by mouth daily. 90 tablet 3    aspirin 81 MG EC tablet Take 1 tablet (81 mg) by mouth daily Start tomorrow. 30 tablet 3    clopidogrel (PLAVIX) 75 MG tablet Take 1 tablet (75 mg) by mouth daily 90 tablet 3    fish oil-omega-3 fatty acids 1000 MG capsule Take 2 g by mouth daily      medical cannabis (Patient's own supply) by Other route See Admin Instructions      methylcellulose (CITRUCEL) 500  "MG TABS tablet Take by mouth every 24 hours.      multivitamin (ONE A DAY) per tablet Take 1 tablet by mouth daily      pantoprazole (PROTONIX) 40 MG EC tablet Take 1 tablet (40 mg) by mouth daily.      rosuvastatin (CRESTOR) 20 MG tablet Take 1 tablet (20 mg) by mouth daily 90 tablet 3    tadalafil (CIALIS) 5 MG tablet Take 1 tablet (5 mg) by mouth every 24 hours. 30 tablet 3    tamsulosin (FLOMAX) 0.4 MG capsule TAKE 2 CAPSULES(0.8 MG) BY MOUTH DAILY 180 capsule 1    triamcinolone (KENALOG) 0.1 % external ointment APPLY TO AFFECTED AREAS TWICE DAILY. TAPER WITH IMPROVEMENT       No current facility-administered medications for this visit.       Physical Exam:  B/P: Data Unavailable, T: Data Unavailable, P: Data Unavailable, R: Data Unavailable  Estimated body mass index is 33.78 kg/m  as calculated from the following:    Height as of 10/18/24: 1.765 m (5' 9.49\").    Weight as of 10/18/24: 105.2 kg (232 lb).  General: age-appropriate appearing male in NAD.    Outside records:   I spent 0 minutes reviewing outside records.      "

## 2024-12-30 NOTE — NURSING NOTE
Current patient location: [address] [place] [id number]    Is the patient currently in the state of [place]? YES    Visit mode:VIDEO    If the visit is dropped, the patient can be reconnected by:VIDEO VISIT: Text to cell phone:   Telephone Information:   Mobile [phone]    and VIDEO VISIT: Send to e-mail at: [email or web address]    Will anyone else be joining the visit? NO  (If patient encounters technical issues they should call [phone])    Are changes needed to the allergy or medication list? Pt stated no med changes    Are refills needed on medications prescribed by this physician? NO    Rooming Documentation:  Not applicable    Reason for visit: RECHECK (BPH with urinary obstruction )    Melita GLORIA

## 2025-01-20 NOTE — PROGRESS NOTES
HEART CARE ENCOUNTER CONSULTATON NOTE      [facility]  [phone]      Assessment/Recommendations   Assessment:   Coronary artery disease:  S/p PCI of mid RCA 90% stenosis with KATERINA x 1/12/2024.  Moderate proximal LAD 50% stenosis was not flow-limiting per FFR.  Continued and stable intermittent, atypical chest discomfort continues although determined to be unrelated to CAD.  Dyslipidemia: LDL 87 on rosuvastatin 20 mg daily  Hypertension: Amlodipine 10 mg  Obesity: Initial weight loss following CAD on weight watchers, has gained about 10 pounds back.  He is back on a weight watchers diet  Hiatal herna, GERD: Follows with [facility]      Plan:   Discontinue Plavix, continue aspirin indefinitely starting 2/12/2025, 1 year post-stent  We discussed statin management with LDL above goal, was previously well-controlled after transition from pravastatin to rosuvastatin.  Increase coincides with weight gain.  Decision to monitor cholesterol in 3 months after return to diet and regular exercise.  At that time consider if increased to rosuvastatin 40 mg daily is indicated  Continue amlodipine 10 mg daily        Follow up in 1 year or sooner as needed     History of Present Illness/Subjective    HPI: Jay Roth is a 69 year old male with PMHx of CAD/PCI, HLD, HTN, obesity presents for follow up. Stent to RCA 2/12/2024.    Patient reports stable condition.  He continues to have intermittent chest discomfort that comes and goes.  Has been following with [facility] after no improvement following stent placement in February 2024.  Several different treatments have not given him any resolved.  He is learned to live with this.  He denies exertional dyspnea.  He did lose weight over the last year but recently gained it back after stopping weight watchers diet.  He and his wife recently resumed it.  Strahl's been variable on current statin, coinciding with weight fluctuations.  He has some minimal swelling in his ankles  "that comes and goes.  This is not bothersome to him    He denies lightheadedness, shortness of breath, dyspnea on exertion, orthopnea, PND, and palpitations.        Coronary Angiogram 2/12/2024 reviewed:  1.  Moderate proximal LAD stenosis just before takeoff of large diagonal branch.  DFR equals 0.94.  No intervention performed.  2.  Normal left circumflex  3.  Large dominant right coronary artery with focal 90% stenosis in mid segment.  Successfully stented with 4.0 x 20 Synergy drug-eluting stent, 0% residual stenosis with KYLE-3 flow.  4.  Plavix loaded 600 mg.  Plan 75 mg/day x 12 months  5.  Intensify lipid therapy; stop Pravachol and start Crestor 20 mg/day.         Physical Examination  Review of Systems   Vitals: /83 (BP Location: Left arm, Patient Position: Sitting, Cuff Size: Adult Large)   Pulse 87   Resp 16   Ht 1.778 m (5' 10\")   Wt 111.6 kg (246 lb)   BMI 35.30 kg/m    BMI= Body mass index is 35.3 kg/m .  Wt Readings from Last 3 Encounters:   01/21/25 111.6 kg (246 lb)   10/18/24 105.2 kg (232 lb)   04/22/24 111.8 kg (246 lb 8 oz)           ENT/Mouth: membranes moist, no oral lesions or bleeding gums.      EYES:  no scleral icterus, normal conjunctivae   Neck: No carotid bruit bilaterally   Chest/Lungs:   lungs are clear to auscultation, no rales or wheezing, equal chest wall expansion    Cardiovascular:   Regular. Normal first and second heart sounds with no murmurs, rubs, or gallops; the radial and posterior tibial pulses are intact, minimal edema bilaterally        Extremities: no cyanosis or clubbing   Skin: no xanthelasma, warm.    Neurologic:  no tremors     Psychiatric: alert and oriented x3, calm        Please refer above for cardiac ROS details.        Medical History  Surgical History Family History Social History   Past Medical History:   Diagnosis Date    GALINDO (acute kidney injury)     BPH (benign prostatic hyperplasia)     Clostridium difficile colitis     h/o 10/19    " Diverticulitis     GERD (gastroesophageal reflux disease)     H/O Bell's palsy     Hematuria     Hyperbilirubinemia     Hyperlipidemia     Hypertension     Insomnia     Lower back pain     Right inguinal hernia     Sleep apnea     Minor Sleep Apnea not CPAP     Past Surgical History:   Procedure Laterality Date    CV CORONARY ANGIOGRAM N/A 2/12/2024    Procedure: Coronary Angiogram;  Surgeon: Merrick Hoyos MD;  Location: [facility] CV    CV INSTANTANEOUS WAVE-FREE RATIO N/A 2/12/2024    Procedure: Instantaneous Wave-Free Ratio;  Surgeon: Merrick Hoyos MD;  Location: [facility]    CV LEFT HEART CATH N/A 2/12/2024    Procedure: Left Heart Catheterization;  Surgeon: Merrick Hoyos MD;  Location: [facility]    CV PCI STENT DRUG ELUTING N/A 2/12/2024    Procedure: Percutaneous Coronary Intervention Stent;  Surgeon: Merrick Hoyos MD;  Location: [facility]    HC KNEE SCOPE,MED/LAT MENISCUS REPAIR      Description: Knee Arthroscopy With Medial Meniscus Repair;  Recorded: 02/11/2009;    HERNIA REPAIR      RI ESOPHAGOGASTRODUODENOSCOPY TRANSORAL DIAGNOSTIC N/A 12/26/2019    Procedure: ESOPHAGOGASTRODUODENOSCOPY (EGD) with biopsies;  Surgeon: Tamir Serna MD;  Location: [facility];  Service: Gastroenterology    RI LAP,SURG,COLECTOMY, PARTIAL, W/ANAST N/A 12/5/2019    Procedure: LAPAROSCOPIC sIGMOIDECTOMY;  Surgeon: Isi Lyles MD;  Location: [facility] OR;  Service: General    SIGMOIDOSCOPY FLEXIBLE N/A 12/24/2019    Procedure: Flexible SIGMOIDOSCOPY;  Surgeon: Isi Lyles MD;  Location: [facility] GI;  Service: Gastroenterology    SIGMOIDOSCOPY FLEXIBLE N/A 12/27/2019    Procedure: Flexible SIGMOIDOSCOPY with biopsy;  Surgeon: Isi Lyles MD;  Location: [facility];  Service: Gastroenterology     No family history on file.     Social History     Socioeconomic History    Marital status:      Spouse name: Not on file    Number  of children: Not on file    Years of education: Not on file    Highest education level: Not on file   Occupational History    Not on file   Tobacco Use    Smoking status: Former     Current packs/day: 0.00     Types: Cigarettes     Quit date:      Years since quittin.0    Smokeless tobacco: Former     Types: Chew     Quit date:     Tobacco comments:     chew daily   Vaping Use    Vaping status: Never Used   Substance and Sexual Activity    Alcohol use: Yes     Comment: Alcoholic Drinks/day: occasional    Drug use: Yes     Comment: Drug use: medical cannabis    Sexual activity: Yes     Partners: Female   Other Topics Concern    Not on file   Social History Narrative    Not on file     Social Drivers of Health     Financial Resource Strain: Low Risk  (10/15/2024)    Financial Resource Strain     Within the past 12 months, have you or your family members you live with been unable to get utilities (heat, electricity) when it was really needed?: No   Food Insecurity: Low Risk  (10/15/2024)    Food Insecurity     Within the past 12 months, did you worry that your food would run out before you got money to buy more?: No     Within the past 12 months, did the food you bought just not last and you didn t have money to get more?: No   Transportation Needs: Low Risk  (10/15/2024)    Transportation Needs     Within the past 12 months, has lack of transportation kept you from medical appointments, getting your medicines, non-medical meetings or appointments, work, or from getting things that you need?: No   Physical Activity: Sufficiently Active (10/15/2024)    Exercise Vital Sign     Days of Exercise per Week: 4 days     Minutes of Exercise per Session: 60 min   Stress: No Stress Concern Present (10/15/2024)    [place] [employer] - Occupational Stress Questionnaire     Feeling of Stress : Not at all   Social Connections: Unknown (10/15/2024)    Social Connection and Isolation Panel [NHANES]      Frequency of Communication with Friends and Family: Not on file     Frequency of Social Gatherings with Friends and Family: Three times a week     Attends Pentecostalism Services: Not on file     Active Member of Clubs or Organizations: Not on file     Attends Club or Organization Meetings: Not on file     Marital Status: Not on file   Interpersonal Safety: Low Risk  (10/18/2024)    Interpersonal Safety     Do you feel physically and emotionally safe where you currently live?: Yes     Within the past 12 months, have you been hit, slapped, kicked or otherwise physically hurt by someone?: No     Within the past 12 months, have you been humiliated or emotionally abused in other ways by your partner or ex-partner?: No   Housing Stability: Low Risk  (10/15/2024)    Housing Stability     Do you have housing? : Yes     Are you worried about losing your housing?: No           Medications  Allergies   Current Outpatient Medications   Medication Sig Dispense Refill    amLODIPine (NORVASC) 10 MG tablet Take 1 tablet (10 mg) by mouth daily. 90 tablet 3    aspirin 81 MG EC tablet Take 1 tablet (81 mg) by mouth daily Start tomorrow. 30 tablet 3    clopidogrel (PLAVIX) 75 MG tablet Take 1 tablet (75 mg) by mouth daily 90 tablet 3    fish oil-omega-3 fatty acids 1000 MG capsule Take 2 g by mouth daily      medical cannabis (Patient's own supply) by Other route See Admin Instructions      methylcellulose (CITRUCEL) 500 MG TABS tablet Take by mouth every 24 hours.      multivitamin (ONE A DAY) per tablet Take 1 tablet by mouth daily      pantoprazole (PROTONIX) 40 MG EC tablet Take 1 tablet (40 mg) by mouth daily.      rosuvastatin (CRESTOR) 20 MG tablet Take 1 tablet (20 mg) by mouth daily 90 tablet 3    tadalafil (CIALIS) 5 MG tablet Take 1 tablet (5 mg) by mouth every 24 hours. 30 tablet 3    tamsulosin (FLOMAX) 0.4 MG capsule TAKE 2 CAPSULES(0.8 MG) BY MOUTH DAILY 180 capsule 1    triamcinolone (KENALOG) 0.1 % external ointment APPLY  TO AFFECTED AREAS TWICE DAILY. TAPER WITH IMPROVEMENT         Allergies   Allergen Reactions    Atorvastatin Calcium [Atorvastatin] Dizziness          Lab Results    Chemistry/lipid CBC Cardiac Enzymes/BNP/TSH/INR   Recent Labs   Lab Test 10/18/24  1337   CHOL 169   HDL 56   LDL 87   TRIG 128     Recent Labs   Lab Test 10/18/24  1337 04/17/24  0812 04/02/24  0759   LDL 87 76 54     Recent Labs   Lab Test 10/18/24  1337      POTASSIUM 4.6   CHLORIDE 101   CO2 27   GLC 96   BUN 20.8   CR 1.13   GFRESTIMATED 71   JAYME 9.9     Recent Labs   Lab Test 10/18/24  1337 04/22/24  1219 04/17/24  0812   CR 1.13 1.22* 1.23*     Recent Labs   Lab Test 10/14/22  0733   A1C 5.0          Recent Labs   Lab Test 10/18/24  1337   WBC 5.9   HGB 17.1   HCT 48.4   MCV 85        Recent Labs   Lab Test 10/18/24  1337 04/17/24  0812 02/09/24  0755   HGB 17.1 16.6 16.9    Recent Labs   Lab Test 02/20/23  1438 11/09/20  1702 11/09/20  1446   TROPONINI 0.01 <0.01 <0.01     Recent Labs   Lab Test 11/09/20  1446   BNP 25     Recent Labs   Lab Test 11/09/20  1702   TSH 2.02     Recent Labs   Lab Test 01/28/20  0612 01/24/20  0617 01/07/20  0657   INR 1.12* 1.09 1.09          This note has been dictated using voice recognition software. Any grammatical, typographical, or context distortions are unintentional and inherent to the software    Su Karimi PA-C

## 2025-01-21 ENCOUNTER — OFFICE VISIT (OUTPATIENT)
Dept: CARDIOLOGY | Facility: CLINIC | Age: 70
End: 2025-01-21
Payer: COMMERCIAL

## 2025-01-21 VITALS
HEART RATE: 87 BPM | RESPIRATION RATE: 16 BRPM | DIASTOLIC BLOOD PRESSURE: 83 MMHG | HEIGHT: 70 IN | SYSTOLIC BLOOD PRESSURE: 135 MMHG | BODY MASS INDEX: 35.22 KG/M2 | WEIGHT: 246 LBS

## 2025-01-21 DIAGNOSIS — I25.10 CORONARY ARTERY DISEASE INVOLVING NATIVE CORONARY ARTERY OF NATIVE HEART WITHOUT ANGINA PECTORIS: Primary | ICD-10-CM

## 2025-01-21 DIAGNOSIS — I10 ESSENTIAL HYPERTENSION, BENIGN: ICD-10-CM

## 2025-01-21 DIAGNOSIS — E78.5 HYPERLIPIDEMIA LDL GOAL <70: ICD-10-CM

## 2025-01-21 DIAGNOSIS — E66.01 MORBID OBESITY (H): ICD-10-CM

## 2025-01-21 PROCEDURE — 99214 OFFICE O/P EST MOD 30 MIN: CPT

## 2025-01-21 NOTE — LETTER
1/21/2025    Aravind Villa MD  [address]  [facility] [address]    RE: Jay Roth       Dear Colleague,     I had the pleasure of seeing Jay Roth in the [facility].    HEART CARE ENCOUNTER CONSULTATON NOTE      [facility]  [phone]      Assessment/Recommendations   Assessment:   Coronary artery disease:  S/p PCI of mid RCA 90% stenosis with KATERINA x 1/12/2024.  Moderate proximal LAD 50% stenosis was not flow-limiting per FFR.  Continued and stable intermittent, atypical chest discomfort continues although determined to be unrelated to CAD.  Dyslipidemia: LDL 87 on rosuvastatin 20 mg daily  Hypertension: Amlodipine 10 mg  Obesity: Initial weight loss following CAD on weight watchers, has gained about 10 pounds back.  He is back on a weight watchers diet  Hiatal herna, GERD: Follows with [facility]      Plan:   Discontinue Plavix, continue aspirin indefinitely starting 2/12/2025, 1 year post-stent  We discussed statin management with LDL above goal, was previously well-controlled after transition from pravastatin to rosuvastatin.  Increase coincides with weight gain.  Decision to monitor cholesterol in 3 months after return to diet and regular exercise.  At that time consider if increased to rosuvastatin 40 mg daily is indicated  Continue amlodipine 10 mg daily        Follow up in 1 year or sooner as needed     History of Present Illness/Subjective    HPI: Jay Roth is a 69 year old male with PMHx of CAD/PCI, HLD, HTN, obesity presents for follow up. Stent to RCA 2/12/2024.    Patient reports stable condition.  He continues to have intermittent chest discomfort that comes and goes.  Has been following with [facility] after no improvement following stent placement in February 2024.  Several different treatments have not given him any resolved.  He is learned to live with this.  He denies exertional dyspnea.  He did lose weight over the last year but recently  "gained it back after stopping weight watchers diet.  He and his wife recently resumed it.  Strahl's been variable on current statin, coinciding with weight fluctuations.  He has some minimal swelling in his ankles that comes and goes.  This is not bothersome to him    He denies lightheadedness, shortness of breath, dyspnea on exertion, orthopnea, PND, and palpitations.        Coronary Angiogram 2/12/2024 reviewed:  1.  Moderate proximal LAD stenosis just before takeoff of large diagonal branch.  DFR equals 0.94.  No intervention performed.  2.  Normal left circumflex  3.  Large dominant right coronary artery with focal 90% stenosis in mid segment.  Successfully stented with 4.0 x 20 Synergy drug-eluting stent, 0% residual stenosis with KYLE-3 flow.  4.  Plavix loaded 600 mg.  Plan 75 mg/day x 12 months  5.  Intensify lipid therapy; stop Pravachol and start Crestor 20 mg/day.         Physical Examination  Review of Systems   Vitals: /83 (BP Location: Left arm, Patient Position: Sitting, Cuff Size: Adult Large)   Pulse 87   Resp 16   Ht 1.778 m (5' 10\")   Wt 111.6 kg (246 lb)   BMI 35.30 kg/m    BMI= Body mass index is 35.3 kg/m .  Wt Readings from Last 3 Encounters:   01/21/25 111.6 kg (246 lb)   10/18/24 105.2 kg (232 lb)   04/22/24 111.8 kg (246 lb 8 oz)           ENT/Mouth: membranes moist, no oral lesions or bleeding gums.      EYES:  no scleral icterus, normal conjunctivae   Neck: No carotid bruit bilaterally   Chest/Lungs:   lungs are clear to auscultation, no rales or wheezing, equal chest wall expansion    Cardiovascular:   Regular. Normal first and second heart sounds with no murmurs, rubs, or gallops; the radial and posterior tibial pulses are intact, minimal edema bilaterally        Extremities: no cyanosis or clubbing   Skin: no xanthelasma, warm.    Neurologic:  no tremors     Psychiatric: alert and oriented x3, calm        Please refer above for cardiac ROS details.        Medical History  " Surgical History Family History Social History   Past Medical History:   Diagnosis Date     GALINDO (acute kidney injury)      BPH (benign prostatic hyperplasia)      Clostridium difficile colitis     h/o 10/19     Diverticulitis      GERD (gastroesophageal reflux disease)      H/O Bell's palsy      Hematuria      Hyperbilirubinemia      Hyperlipidemia      Hypertension      Insomnia      Lower back pain      Right inguinal hernia      Sleep apnea     Minor Sleep Apnea not CPAP     Past Surgical History:   Procedure Laterality Date     CV CORONARY ANGIOGRAM N/A 2/12/2024    Procedure: Coronary Angiogram;  Surgeon: Merrick Hoyos MD;  Location: [facility] CV     CV INSTANTANEOUS WAVE-FREE RATIO N/A 2/12/2024    Procedure: Instantaneous Wave-Free Ratio;  Surgeon: Merrick Hoyos MD;  Location: [facility]     CV LEFT HEART CATH N/A 2/12/2024    Procedure: Left Heart Catheterization;  Surgeon: Merrick Hoyos MD;  Location: [facility]     CV PCI STENT DRUG ELUTING N/A 2/12/2024    Procedure: Percutaneous Coronary Intervention Stent;  Surgeon: Merrick Hoyos MD;  Location: [facility]     HC KNEE SCOPE,MED/LAT MENISCUS REPAIR      Description: Knee Arthroscopy With Medial Meniscus Repair;  Recorded: 02/11/2009;     HERNIA REPAIR       MD ESOPHAGOGASTRODUODENOSCOPY TRANSORAL DIAGNOSTIC N/A 12/26/2019    Procedure: ESOPHAGOGASTRODUODENOSCOPY (EGD) with biopsies;  Surgeon: Tamir Serna MD;  Location: [facility];  Service: Gastroenterology     MD LAP,SURG,COLECTOMY, PARTIAL, W/ANAST N/A 12/5/2019    Procedure: LAPAROSCOPIC sIGMOIDECTOMY;  Surgeon: Isi Lyles MD;  Location: [facility] OR;  Service: General     SIGMOIDOSCOPY FLEXIBLE N/A 12/24/2019    Procedure: Flexible SIGMOIDOSCOPY;  Surgeon: Isi Lyles MD;  Location: [facility];  Service: Gastroenterology     SIGMOIDOSCOPY FLEXIBLE N/A 12/27/2019    Procedure: Flexible SIGMOIDOSCOPY with biopsy;   Surgeon: Isi Lyles MD;  Location: [facility];  Service: Gastroenterology     No family history on file.     Social History     Socioeconomic History     Marital status:      Spouse name: Not on file     Number of children: Not on file     Years of education: Not on file     Highest education level: Not on file   Occupational History     Not on file   Tobacco Use     Smoking status: Former     Current packs/day: 0.00     Types: Cigarettes     Quit date:      Years since quittin.0     Smokeless tobacco: Former     Types: Chew     Quit date:      Tobacco comments:     chew daily   Vaping Use     Vaping status: Never Used   Substance and Sexual Activity     Alcohol use: Yes     Comment: Alcoholic Drinks/day: occasional     Drug use: Yes     Comment: Drug use: medical cannabis     Sexual activity: Yes     Partners: Female   Other Topics Concern     Not on file   Social History Narrative     Not on file     Social Drivers of Health     Financial Resource Strain: Low Risk  (10/15/2024)    Financial Resource Strain      Within the past 12 months, have you or your family members you live with been unable to get utilities (heat, electricity) when it was really needed?: No   Food Insecurity: Low Risk  (10/15/2024)    Food Insecurity      Within the past 12 months, did you worry that your food would run out before you got money to buy more?: No      Within the past 12 months, did the food you bought just not last and you didn t have money to get more?: No   Transportation Needs: Low Risk  (10/15/2024)    Transportation Needs      Within the past 12 months, has lack of transportation kept you from medical appointments, getting your medicines, non-medical meetings or appointments, work, or from getting things that you need?: No   Physical Activity: Sufficiently Active (10/15/2024)    Exercise Vital Sign      Days of Exercise per Week: 4 days      Minutes of Exercise per Session: 60 min   Stress:  No Stress Concern Present (10/15/2024)    [place] [employer]ational [employer] - Occupational Stress Questionnaire      Feeling of Stress : Not at all   Social Connections: Unknown (10/15/2024)    Social Connection and Isolation Panel [NHANES]      Frequency of Communication with Friends and Family: Not on file      Frequency of Social Gatherings with Friends and Family: Three times a week      Attends Latter day Services: Not on file      Active Member of Clubs or Organizations: Not on file      Attends Club or Organization Meetings: Not on file      Marital Status: Not on file   Interpersonal Safety: Low Risk  (10/18/2024)    Interpersonal Safety      Do you feel physically and emotionally safe where you currently live?: Yes      Within the past 12 months, have you been hit, slapped, kicked or otherwise physically hurt by someone?: No      Within the past 12 months, have you been humiliated or emotionally abused in other ways by your partner or ex-partner?: No   Housing Stability: Low Risk  (10/15/2024)    Housing Stability      Do you have housing? : Yes      Are you worried about losing your housing?: No           Medications  Allergies   Current Outpatient Medications   Medication Sig Dispense Refill     amLODIPine (NORVASC) 10 MG tablet Take 1 tablet (10 mg) by mouth daily. 90 tablet 3     aspirin 81 MG EC tablet Take 1 tablet (81 mg) by mouth daily Start tomorrow. 30 tablet 3     clopidogrel (PLAVIX) 75 MG tablet Take 1 tablet (75 mg) by mouth daily 90 tablet 3     fish oil-omega-3 fatty acids 1000 MG capsule Take 2 g by mouth daily       medical cannabis (Patient's own supply) by Other route See Admin Instructions       methylcellulose (CITRUCEL) 500 MG TABS tablet Take by mouth every 24 hours.       multivitamin (ONE A DAY) per tablet Take 1 tablet by mouth daily       pantoprazole (PROTONIX) 40 MG EC tablet Take 1 tablet (40 mg) by mouth daily.       rosuvastatin (CRESTOR) 20 MG tablet Take 1 tablet (20  mg) by mouth daily 90 tablet 3     tadalafil (CIALIS) 5 MG tablet Take 1 tablet (5 mg) by mouth every 24 hours. 30 tablet 3     tamsulosin (FLOMAX) 0.4 MG capsule TAKE 2 CAPSULES(0.8 MG) BY MOUTH DAILY 180 capsule 1     triamcinolone (KENALOG) 0.1 % external ointment APPLY TO AFFECTED AREAS TWICE DAILY. TAPER WITH IMPROVEMENT         Allergies   Allergen Reactions     Atorvastatin Calcium [Atorvastatin] Dizziness          Lab Results    Chemistry/lipid CBC Cardiac Enzymes/BNP/TSH/INR   Recent Labs   Lab Test 10/18/24  1337   CHOL 169   HDL 56   LDL 87   TRIG 128     Recent Labs   Lab Test 10/18/24  1337 04/17/24  0812 04/02/24  0759   LDL 87 76 54     Recent Labs   Lab Test 10/18/24  1337      POTASSIUM 4.6   CHLORIDE 101   CO2 27   GLC 96   BUN 20.8   CR 1.13   GFRESTIMATED 71   JAYME 9.9     Recent Labs   Lab Test 10/18/24  1337 04/22/24  1219 04/17/24  0812   CR 1.13 1.22* 1.23*     Recent Labs   Lab Test 10/14/22  0733   A1C 5.0          Recent Labs   Lab Test 10/18/24  1337   WBC 5.9   HGB 17.1   HCT 48.4   MCV 85        Recent Labs   Lab Test 10/18/24  1337 04/17/24  0812 02/09/24  0755   HGB 17.1 16.6 16.9    Recent Labs   Lab Test 02/20/23  1438 11/09/20  1702 11/09/20  1446   TROPONINI 0.01 <0.01 <0.01     Recent Labs   Lab Test 11/09/20  1446   BNP 25     Recent Labs   Lab Test 11/09/20  1702   TSH 2.02     Recent Labs   Lab Test 01/28/20  0612 01/24/20  0617 01/07/20  0657   INR 1.12* 1.09 1.09          This note has been dictated using voice recognition software. Any grammatical, typographical, or context distortions are unintentional and inherent to the software    Su Karimi PA-C                                         Thank you for allowing me to participate in the care of your patient.      Sincerely,     Su Tabares PA-C     [facility]  cc:   Dino Prajapati DO  [address] [place] [address]

## 2025-01-21 NOTE — PATIENT INSTRUCTIONS
It was a pleasure taking part in your care today:    - On 2/12/2025 discontinue Plavix 1 year post-stent  - Cholesterol panel in 3 months.  - Continue aspirin indefinitely   - Follow up in 1year    Please call the [facility] clinic with any questions or concerns at [phone].     Su Karimi PA-C

## 2025-01-30 DIAGNOSIS — Z95.5 S/P CORONARY ARTERY STENT PLACEMENT: ICD-10-CM

## 2025-01-30 NOTE — TELEPHONE ENCOUNTER
Medication Question or Refill    Contacts       Contact Date/Time Type Contact Phone/Fax    01/30/2025 04:35 PM CST Phone (Incoming) Gonzalez Grzegorz CRUZ (Self) [phone] (M)            What medication are you calling about (include dose and sig)?: Rosuvastain 20Mg    Preferred Pharmacy:   [id number] [address] AT [facility] [address]  [place] [address]  Phone: [phone] Fax: [phone]      Controlled Substance Agreement on file:   CSA -- Patient Level:    CSA: None found at the patient level.         Do you need a refill? Yes        Do you have any questions or concerns?  No      Could we send this information to you in [place] or would you prefer to receive a phone call?:   No preference   Okay to leave a detailed message?: Yes at Cell number on file:    Telephone Information:   Mobile [phone]   Refill Request  Medication name: Pending Prescriptions:                       Disp   Refills    rosuvastatin (CRESTOR) 20 MG tablet       90 tab*3            Sig: Take 1 tablet (20 mg) by mouth daily.    Requested Pharmacy:  [id number] [address] AT [place] [id number]

## 2025-02-01 RX ORDER — ROSUVASTATIN CALCIUM 20 MG/1
20 TABLET, COATED ORAL DAILY
Qty: 90 TABLET | Refills: 3 | Status: SHIPPED | OUTPATIENT
Start: 2025-02-01

## 2025-02-26 DIAGNOSIS — N40.1 BPH WITH URINARY OBSTRUCTION: ICD-10-CM

## 2025-02-26 DIAGNOSIS — R32 URINARY INCONTINENCE, UNSPECIFIED TYPE: ICD-10-CM

## 2025-02-26 DIAGNOSIS — N13.8 BPH WITH URINARY OBSTRUCTION: ICD-10-CM

## 2025-02-26 RX ORDER — TAMSULOSIN HYDROCHLORIDE 0.4 MG/1
0.8 CAPSULE ORAL DAILY
Qty: 180 CAPSULE | Refills: 3 | Status: SHIPPED | OUTPATIENT
Start: 2025-02-26

## 2025-04-06 ENCOUNTER — HEALTH MAINTENANCE LETTER (OUTPATIENT)
Age: 70
End: 2025-04-06

## 2025-04-21 ENCOUNTER — OFFICE VISIT (OUTPATIENT)
Dept: UROLOGY | Facility: CLINIC | Age: 70
End: 2025-04-21
Payer: COMMERCIAL

## 2025-04-21 VITALS — TEMPERATURE: 98 F | SYSTOLIC BLOOD PRESSURE: 143 MMHG | DIASTOLIC BLOOD PRESSURE: 84 MMHG | HEART RATE: 97 BPM

## 2025-04-21 DIAGNOSIS — N40.1 BPH WITH URINARY OBSTRUCTION: Primary | ICD-10-CM

## 2025-04-21 DIAGNOSIS — N13.8 BPH WITH URINARY OBSTRUCTION: Primary | ICD-10-CM

## 2025-04-21 RX ORDER — LIDOCAINE HYDROCHLORIDE 20 MG/ML
JELLY TOPICAL ONCE
Status: COMPLETED | OUTPATIENT
Start: 2025-04-21 | End: 2025-04-21

## 2025-04-21 RX ADMIN — LIDOCAINE HYDROCHLORIDE: 20 JELLY TOPICAL at 14:09

## 2025-04-21 ASSESSMENT — PAIN SCALES - GENERAL: PAINLEVEL_OUTOF10: NO PAIN (0)

## 2025-04-21 NOTE — PROGRESS NOTES
Patient educated regarding cystoscopy procedure and possible symptoms after completion.  Patient voiced understanding of information.  Handout given to patient.  Consent form signed.  Veronique Prather RN    Clinic Administered Medication Documentation    Patient was given lidocaine jelly 2% per urethra. Prior to medication administration, verified patient's identity using patient's name and date of birth.    Samantha Walter CMA

## 2025-04-21 NOTE — PATIENT INSTRUCTIONS
"We can continue to observe your symptoms.  Would schedule a follow-up visit in 6 months.    We could consider adding a medication called Finasteride.  This takes upto 6 months to work.  Side effects can be erectile dysfunction, fatigue, breast tenderness/swelling or depression (rare).    Procedural options:    For prostate artery embolization (PAE), this works by blocking blood flow to prostate to allow it to soften and shrink slightly to allow urine to pass easier.  This has approximately an 85% success rate after the procedure and a 15-20% retreatment rate in 5 years.  This has a very low rate of urinary incontinence.  There is no catheter or hospital stay associated with this procedure.  There is a small chance of developing retrograde ejaculation (no semen coming out of the end of the penis).    For water vapor thermal therapy (\"steam treatment\" or Rezum, this works by utilizing steam to kill some of the prostate tissue and it falls off the center to create a larger channel..  This has approximately an 80-85% success rate after the procedure and a 15-20% retreatment rate in 5 years.  This also has a very low rate of urinary incontinence.  This can be done in the office with oral sedation and a prostate block, or in the operating room under sedation.  There is a catheter for 1 week after the procedure.  There is a small chance of developing retrograde ejaculation (no semen coming out of the end of the penis).    For balloon dilation (Optilume for BPH), this works by utilizing a drug coated balloon to stretch and reshape the prostate..  This has approximately an 80-85% success rate after the procedure and a 15-20% retreatment rate in 5 years.  This also has a very low rate of urinary incontinence.  This is done in the operating room under sedation.  There is a catheter for 1-3 days after the procedure.  There is a small chance of developing retrograde ejaculation (no semen coming out of the end of the " "penis).    For robotic waterjet ablation of the prostate (Aquablation), this uses a robotic guided water jet under ultrasound and visual guidance.  Some electrical coagulation (cautery) is used at the end to limit bleeding at the end of the procedure.  This is done under general anesthesia.  Patients are typically admitted to the hospital for 1-2 days after the procedure with the goal of removing the catheter prior to discharge.  This has approximately an 90% success rate after the procedure and a 5-10% retreatment rate in 5 years.  There is a low chance of  incontinence after the procedure (5-10% short term) and a low rate (5-10%) of partial or complete retrograde ejaculation (no semen coming out of the end of the penis) after the procedure.    For holmium laser enucleation of the prostate (HoLEP), this uses a laser to help physically remove the inner prostate tissue from the shell.  This has approximately an 99% success rate after the procedure and a 1-2% retreatment rate in 5 years.  There is a high chance of short term incontinence for 4-6 weeks that may require pads or diapers.  At 3 months, approximately 5% of men are still having leakage and at 6 months and beyond it is 1-2%.  This procedure will take 1-3 hours under general anesthesia depending on the size of the prostate.  Patients can possibly go home the same day without a catheter or stay the night in the hospital if they prefer. Patients should expect to have retrograde ejaculation after surgery (no semen coming out of the end of the penis).    AFTER YOUR CYSTOSCOPY        You have just completed a cystoscopy, or \"cysto\", which allowed your physician to learn more about your bladder (or to remove a stent placed after surgery). We suggest that you continue to avoid caffeine, fruit juice, and alcohol for the next 24 hours, however, you are encouraged to return to your normal activities.         A few things that are considered normal after your " "cystoscopy:     * Small amount of bleeding (or spotting) that clears within the next 24 hours     * Slight burning sensation with urination     * Sensation to of needing to avoid more frequently     * The feeling of \"air\" in your urine     * Mild discomfort that is relieved with Tylenol        Please contact our office promptly if you:     * Develop a fever above 101 degrees     * Are unable to urinate     * Develop bright red blood that does not stop     * Severe pain or swelling         Please contact our office with any concerns or questions @DEPHN.  "

## 2025-04-21 NOTE — PROGRESS NOTES
PRE-PROCEDURE DIAGNOSIS: BPH with lower urinary tract symptoms  POST-PROCEDURE DIAGNOSIS: Bilobar BPH with anatomic obstruction  PROCEDURE: Cystoscopy   HISTORY: Mr. Roth is a 69 year old male with bothersome lower urinary tract symptoms on maximum dose of tamsulosin he did not tolerate tadalafil due to side effects.  Prostate is about 40 mL per my interpretation prior CT.    DESCRIPTION OF PROCEDURE: After informed consent was obtained, the patient was brought to the procedure room where he was placed in the supine position with all pressure points well padded.  The penis and scrotum were prepped and draped in a sterile fashion. A flexible cystoscope was introduced through a well-lubricated urethra. Anterior urethra strictures were absent.   The urinary sphincter was intact.  The prostate demonstrated bilobar hypertophy.  Median lobe was absent on retroflex view.  Prostatic urethra was 3 to 4 cm in length  The bladder was unremarkable for tumors, stones.  There were no trabeculations, cellules or diverticulae.  The flexible cystoscope was removed.    ASSESSMENT AND PLAN: 69 year old male with a 40 mm prostate with bilobar anatomy.    Discussed BPH treatment options given prostate volume, including medications (5 alpha reductase inhibitors,),  Minimally invasive surgical techniques (Rezum, Optilume), Aquablation, PAE and HoLEP.  With each of these, discussed benefits, side effects, treatment/retreatment rates with regard to patients prostate symptoms and size.    Patient will consider his options and get back to us if he like to proceed with either finasteride or a procedure.  Otherwise we will see him back in 6 months with AUA score, uroflow, PVR to follow-up his symptoms

## 2025-04-29 ENCOUNTER — MYC MEDICAL ADVICE (OUTPATIENT)
Dept: FAMILY MEDICINE | Facility: CLINIC | Age: 70
End: 2025-04-29
Payer: COMMERCIAL

## 2025-04-29 NOTE — TELEPHONE ENCOUNTER
Patient Quality Outreach    Patient is due for the following:   Hypertension -  BP check    Action(s) Taken:   Patient needs to check their blood pressure and send their blood pressure readings into the clinic for a provider to review.    Type of outreach:    Sent [employer] message.    Questions for provider review:    None         Areli Gorman RN  Chart routed to None.

## 2025-04-30 ENCOUNTER — OFFICE VISIT (OUTPATIENT)
Dept: FAMILY MEDICINE | Facility: CLINIC | Age: 70
End: 2025-04-30
Payer: COMMERCIAL

## 2025-04-30 VITALS
HEIGHT: 70 IN | OXYGEN SATURATION: 95 % | DIASTOLIC BLOOD PRESSURE: 80 MMHG | WEIGHT: 239 LBS | TEMPERATURE: 98.2 F | HEART RATE: 73 BPM | BODY MASS INDEX: 34.22 KG/M2 | RESPIRATION RATE: 15 BRPM | SYSTOLIC BLOOD PRESSURE: 120 MMHG

## 2025-04-30 DIAGNOSIS — Z23 ENCOUNTER FOR IMMUNIZATION: ICD-10-CM

## 2025-04-30 DIAGNOSIS — Z95.5 S/P CORONARY ARTERY STENT PLACEMENT: ICD-10-CM

## 2025-04-30 DIAGNOSIS — I10 ESSENTIAL HYPERTENSION, BENIGN: ICD-10-CM

## 2025-04-30 DIAGNOSIS — E78.5 HYPERLIPIDEMIA LDL GOAL <100: ICD-10-CM

## 2025-04-30 DIAGNOSIS — E78.5 HYPERLIPIDEMIA LDL GOAL <70: ICD-10-CM

## 2025-04-30 DIAGNOSIS — S86.002A INJURY OF LEFT ACHILLES TENDON, INITIAL ENCOUNTER: ICD-10-CM

## 2025-04-30 DIAGNOSIS — I25.118 CORONARY ARTERY DISEASE OF NATIVE ARTERY OF NATIVE HEART WITH STABLE ANGINA PECTORIS: Primary | ICD-10-CM

## 2025-04-30 LAB
ANION GAP SERPL CALCULATED.3IONS-SCNC: 9 MMOL/L (ref 7–15)
BUN SERPL-MCNC: 21.6 MG/DL (ref 8–23)
CALCIUM SERPL-MCNC: 9.1 MG/DL (ref 8.8–10.4)
CHLORIDE SERPL-SCNC: 102 MMOL/L (ref 98–107)
CHOLEST SERPL-MCNC: 167 MG/DL
CREAT SERPL-MCNC: 1.07 MG/DL (ref 0.67–1.17)
EGFRCR SERPLBLD CKD-EPI 2021: 76 ML/MIN/1.73M2
FASTING STATUS PATIENT QL REPORTED: YES
FASTING STATUS PATIENT QL REPORTED: YES
GLUCOSE SERPL-MCNC: 87 MG/DL (ref 70–99)
HCO3 SERPL-SCNC: 28 MMOL/L (ref 22–29)
HDLC SERPL-MCNC: 52 MG/DL
LDLC SERPL CALC-MCNC: 88 MG/DL
NONHDLC SERPL-MCNC: 115 MG/DL
POTASSIUM SERPL-SCNC: 5.1 MMOL/L (ref 3.4–5.3)
SODIUM SERPL-SCNC: 139 MMOL/L (ref 135–145)
TRIGL SERPL-MCNC: 134 MG/DL

## 2025-04-30 PROCEDURE — G2211 COMPLEX E/M VISIT ADD ON: HCPCS | Performed by: FAMILY MEDICINE

## 2025-04-30 PROCEDURE — 80061 LIPID PANEL: CPT | Performed by: FAMILY MEDICINE

## 2025-04-30 PROCEDURE — 91320 SARSCV2 VAC 30MCG TRS-SUC IM: CPT | Performed by: FAMILY MEDICINE

## 2025-04-30 PROCEDURE — 99214 OFFICE O/P EST MOD 30 MIN: CPT | Performed by: FAMILY MEDICINE

## 2025-04-30 PROCEDURE — 80048 BASIC METABOLIC PNL TOTAL CA: CPT | Performed by: FAMILY MEDICINE

## 2025-04-30 PROCEDURE — 1125F AMNT PAIN NOTED PAIN PRSNT: CPT | Performed by: FAMILY MEDICINE

## 2025-04-30 PROCEDURE — 90480 ADMN SARSCOV2 VAC 1/ONLY CMP: CPT | Performed by: FAMILY MEDICINE

## 2025-04-30 PROCEDURE — 3079F DIAST BP 80-89 MM HG: CPT | Performed by: FAMILY MEDICINE

## 2025-04-30 PROCEDURE — 3074F SYST BP LT 130 MM HG: CPT | Performed by: FAMILY MEDICINE

## 2025-04-30 PROCEDURE — 36415 COLL VENOUS BLD VENIPUNCTURE: CPT | Performed by: FAMILY MEDICINE

## 2025-04-30 RX ORDER — ROSUVASTATIN CALCIUM 40 MG/1
40 TABLET, COATED ORAL DAILY
Qty: 90 TABLET | Refills: 3 | Status: SHIPPED | OUTPATIENT
Start: 2025-04-30

## 2025-04-30 ASSESSMENT — PAIN SCALES - GENERAL: PAINLEVEL_OUTOF10: MODERATE PAIN (5)

## 2025-04-30 NOTE — PROGRESS NOTES
Assessment/Plan:    Coronary artery disease of native artery of native heart with stable angina pectoris  CAD, stable.  No current concern.  Atypical chest pain considered noncardiac.  Status post drug-eluting stent x 1 and did complete 1 year of dual antiplatelet therapy through February 12, 2025 and discontinued clopidogrel and continues low-dose aspirin indefinitely.  Had been seen in January by cardiology with 1 year follow-up recommendation.    S/P coronary artery stent placement  As above, status post drug-eluting stent x 1 to mid RCA secondary to 90% stenosis historically and did complete 1 year of dual antiplatelet therapy through February 12, 2025 and now on low-dose aspirin indefinitely.    Essential hypertension, benign  Hypertension stable with amlodipine 10 mg daily.    Hyperlipidemia LDL goal <100  Hyperlipidemia reviewed.  Rosuvastatin 20 mg daily.  Consider further dose increase to 40 mg if lipid cascade not at goal.    Injury of left Achilles tendon, initial encounter  Ortho referral was placed however patient likely will go through [facility] later this morning regarding likely partial left Achilles tendon rupture tear.  - Orthopedic  Referral    Encounter for immunization  COVID booster provided based on age criteria.  - COVID-19 12+ (PFIZER)      The following high BMI interventions were performed this visit: encouragement to exercise, weight monitoring, weight loss from baseline weight, and lifestyle education regarding diet .  Ensure ongoing efforts to achieve weight goal < 230 pounds initially, < 220 pounds ideally.     The longitudinal plan of care for the diagnosis(es)/condition(s) as documented were addressed during this visit. Due to the added complexity in care, I will continue to support Grzegorz in the subsequent management and with ongoing continuity of care.               Subjective:    Jay Roth is seen today for follow-up assessment.  CAD.  Status post coronary  "artery stenting in mid RCA secondary to 90% stenosis.  Was seen by cardiology in January for follow-up and told to continue dual antiplatelet therapy through 2025 then discontinue clopidogrel and continue aspirin indefinitely.  Hypertension treated with amlodipine 10 mg daily.  Rosuvastatin 20 mg daily for cholesterol management.  Left Achilles injury yesterday while playing pickle ball.  Felt a pop sensation.  Area of swelling.  Feels slightly better this morning.  Iced area.  Gentle stretching.  Needs COVID booster.  Comprehensive review of systems as above otherwise all negative.        Reviewed cardiology note from 25  Assessment:   Coronary artery disease:  S/p PCI of mid RCA 90% stenosis with KATERINA x 2024.  Moderate proximal LAD 50% stenosis was not flow-limiting per FFR.  Continued and stable intermittent, atypical chest discomfort continues although determined to be unrelated to CAD.  Dyslipidemia: LDL 87 on rosuvastatin 20 mg daily  Hypertension: Amlodipine 10 mg  Obesity: Initial weight loss following CAD on weight watchers, has gained about 10 pounds back.  He is back on a weight watchers diet  Hiatal herna, GERD: Follows with [facility]     Plan:   Discontinue Plavix, continue aspirin indefinitely starting 2025, 1 year post-stent  We discussed statin management with LDL above goal, was previously well-controlled after transition from pravastatin to rosuvastatin.  Increase coincides with weight gain.  Decision to monitor cholesterol in 3 months after return to diet and regular exercise.  At that time consider if increased to rosuvastatin 40 mg daily is indicated  Continue amlodipine 10 mg daily     Follow up in 1 year or sooner as needed         - Lovely   3 children - 2 sons, 1 daughter   5 grandchildren   Quit smoking ~ 20 years   Quit 19 - prior chew 1 tin per 3 days   EtOH: occ   Mom -  84 kidney failure   Dad -  86 \"wore out\"   2 bros -   1 sis - "   Surgeries: knee arthroscopy bilateral; left inguinal hernia; laparoscopic sigmoid colectomy 12/5/19; left TKA (Dr. Ramos with [facility]) on 11/7/22;  PCI of mid RCA 90% stenosis with KATERINA x 1 (2/2024).  Moderate proximal LAD 50% stenosis is not flow-limiting by invasive flow wire;   Radiofrequency ablation procedure x 3 for lower back pain   Hospitalizations: 12/5/19 through Feb, 2020 (3 total admissions required) due to GI concerns   Work: retired as beer distributor (L1-L4 degenerative back issues) - oxycodone if walk, etc.   Hobbies: swim 5x/week, fishing;       Past Surgical History:   Procedure Laterality Date    CV CORONARY ANGIOGRAM N/A 2/12/2024    Procedure: Coronary Angiogram;  Surgeon: Merrick Hoyos MD;  Location: [facility]    CV INSTANTANEOUS WAVE-FREE RATIO N/A 2/12/2024    Procedure: Instantaneous Wave-Free Ratio;  Surgeon: eMrrick Hoyos MD;  Location: [facility]    CV LEFT HEART CATH N/A 2/12/2024    Procedure: Left Heart Catheterization;  Surgeon: Merrick Hoyos MD;  Location: [facility]    CV PCI STENT DRUG ELUTING N/A 2/12/2024    Procedure: Percutaneous Coronary Intervention Stent;  Surgeon: Merrick Hoyos MD;  Location: [facility]    HC KNEE SCOPE,MED/LAT MENISCUS REPAIR      Description: Knee Arthroscopy With Medial Meniscus Repair;  Recorded: 02/11/2009;    HERNIA REPAIR      WA ESOPHAGOGASTRODUODENOSCOPY TRANSORAL DIAGNOSTIC N/A 12/26/2019    Procedure: ESOPHAGOGASTRODUODENOSCOPY (EGD) with biopsies;  Surgeon: Tamir Serna MD;  Location: [facility];  Service: Gastroenterology    WA LAP,SURG,COLECTOMY, PARTIAL, W/ANAST N/A 12/5/2019    Procedure: LAPAROSCOPIC sIGMOIDECTOMY;  Surgeon: Isi Lyles MD;  Location: [facility];  Service: General    SIGMOIDOSCOPY FLEXIBLE N/A 12/24/2019    Procedure: Flexible SIGMOIDOSCOPY;  Surgeon: Isi Lyles MD;  Location: [facility];  Service: Gastroenterology     SIGMOIDOSCOPY FLEXIBLE N/A 2019    Procedure: Flexible SIGMOIDOSCOPY with biopsy;  Surgeon: Isi Lyles MD;  Location: [facility];  Service: Gastroenterology        No family history on file.     Past Medical History:   Diagnosis Date    GALINDO (acute kidney injury)     BPH (benign prostatic hyperplasia)     Clostridium difficile colitis     h/o 10/19    Diverticulitis     GERD (gastroesophageal reflux disease)     H/O Bell's palsy     Hematuria     Hyperbilirubinemia     Hyperlipidemia     Hypertension     Insomnia     Lower back pain     Right inguinal hernia     Sleep apnea     Minor Sleep Apnea not CPAP        Social History     Tobacco Use    Smoking status: Former     Current packs/day: 0.00     Types: Cigarettes     Quit date:      Years since quittin.3    Smokeless tobacco: Former     Types: Chew     Quit date:     Tobacco comments:     chew daily   Vaping Use    Vaping status: Never Used   Substance Use Topics    Alcohol use: Yes     Comment: Alcoholic Drinks/day: occasional    Drug use: Yes     Comment: Drug use: medical cannabis        Current Outpatient Medications   Medication Sig Dispense Refill    amLODIPine (NORVASC) 10 MG tablet Take 1 tablet (10 mg) by mouth daily. 90 tablet 3    aspirin 81 MG EC tablet Take 1 tablet (81 mg) by mouth daily Start tomorrow. 30 tablet 3    fish oil-omega-3 fatty acids 1000 MG capsule Take 2 g by mouth daily      medical cannabis (Patient's own supply) by Other route See Admin Instructions      methylcellulose (CITRUCEL) 500 MG TABS tablet Take by mouth every 24 hours.      multivitamin (ONE A DAY) per tablet Take 1 tablet by mouth daily      pantoprazole (PROTONIX) 40 MG EC tablet Take 1 tablet (40 mg) by mouth daily.      rosuvastatin (CRESTOR) 20 MG tablet Take 1 tablet (20 mg) by mouth daily. 90 tablet 3    tamsulosin (FLOMAX) 0.4 MG capsule TAKE 2 CAPSULES(0.8 MG) BY MOUTH DAILY 180 capsule 3    triamcinolone (KENALOG) 0.1 % external  "ointment APPLY TO AFFECTED AREAS TWICE DAILY. TAPER WITH IMPROVEMENT            Objective:    Vitals:    04/30/25 0652   BP: 120/80   Pulse: 73   Resp: 15   Temp: 98.2  F (36.8  C)   SpO2: 95%   Weight: 108.4 kg (239 lb)   Height: 1.778 m (5' 10\")      Body mass index is 34.29 kg/m .    Alert.  No apparent distress.  Blood pressure at goal.  Chest clear.  Cardiac exam regular.  Left Achilles tendon intact however area of swelling posterior aspect of left ankle involving Achilles tendon.  No ecchymoses.  He is able to plantarflex and dorsiflex.      This note has been dictated using voice recognition software and as a result may contain minor grammatical errors and unintended word substitutions. Answers submitted by the patient for this visit:  Lipid Visit (Submitted on 4/27/2025)  Chief Complaint: Chronic problems general questions HPI Form  Are you regularly taking any medication or supplement to lower your cholesterol?: Yes  Are you having muscle aches or other side effects that you think could be caused by your cholesterol lowering medication?: No  Hypertension Visit (Submitted on 4/27/2025)  Chief Complaint: Chronic problems general questions HPI Form  Do you check your blood pressure regularly outside of the clinic?: No  Are your blood pressures ever more than 140 on the top number (systolic) OR more than 90 on the bottom number (diastolic)? (For example, greater than 140/90): Yes  Are you following a low salt diet?: No  General Questionnaire (Submitted on 4/27/2025)  Chief Complaint: Chronic problems general questions HPI Form  How many servings of fruits and vegetables do you eat daily?: 2-3  On average, how many sweetened beverages do you drink each day (Examples: soda, juice, sweet tea, etc.  Do NOT count diet or artificially sweetened beverages)?: 0  How many minutes a day do you exercise enough to make your heart beat faster?: 30 to 60  How many days a week do you exercise enough to make your heart beat " faster?: 5  How many days per week do you miss taking your medication?: 0  Questionnaire about: Chronic problems general questions HPI Form (Submitted on 4/27/2025)  Chief Complaint: Chronic problems general questions HPI Form

## 2025-05-01 ENCOUNTER — PATIENT OUTREACH (OUTPATIENT)
Dept: CARE COORDINATION | Facility: CLINIC | Age: 70
End: 2025-05-01
Payer: COMMERCIAL

## 2025-05-05 ENCOUNTER — PATIENT OUTREACH (OUTPATIENT)
Dept: CARE COORDINATION | Facility: CLINIC | Age: 70
End: 2025-05-05
Payer: COMMERCIAL

## 2025-05-14 ENCOUNTER — APPOINTMENT (OUTPATIENT)
Dept: URBAN - METROPOLITAN AREA CLINIC 260 | Age: 70
Setting detail: DERMATOLOGY
End: 2025-05-14

## 2025-05-14 VITALS — WEIGHT: 237 LBS | HEIGHT: 60 IN | RESPIRATION RATE: 15 BRPM

## 2025-05-14 DIAGNOSIS — D22 MELANOCYTIC NEVI: ICD-10-CM

## 2025-05-14 DIAGNOSIS — L82.1 OTHER SEBORRHEIC KERATOSIS: ICD-10-CM

## 2025-05-14 DIAGNOSIS — L57.0 ACTINIC KERATOSIS: ICD-10-CM

## 2025-05-14 PROBLEM — D22.9 MELANOCYTIC NEVI, UNSPECIFIED: Status: ACTIVE | Noted: 2025-05-14

## 2025-05-14 PROCEDURE — OTHER COUNSELING: OTHER

## 2025-05-14 PROCEDURE — OTHER ADDITIONAL NOTES: OTHER

## 2025-05-14 PROCEDURE — OTHER MIPS QUALITY: OTHER

## 2025-05-14 PROCEDURE — 99213 OFFICE O/P EST LOW 20 MIN: CPT | Mod: 25

## 2025-05-14 PROCEDURE — 17004 DESTROY PREMAL LESIONS 15/>: CPT

## 2025-05-14 PROCEDURE — OTHER LIQUID NITROGEN: OTHER

## 2025-05-14 ASSESSMENT — LOCATION DETAILED DESCRIPTION DERM
LOCATION DETAILED: RIGHT CENTRAL FRONTAL SCALP
LOCATION DETAILED: RIGHT SUPERIOR HELIX
LOCATION DETAILED: LEFT CENTRAL PARIETAL SCALP
LOCATION DETAILED: LEFT MEDIAL FRONTAL SCALP
LOCATION DETAILED: RIGHT CENTRAL PARIETAL SCALP
LOCATION DETAILED: LEFT CENTRAL FRONTAL SCALP
LOCATION DETAILED: RIGHT CENTRAL EYEBROW
LOCATION DETAILED: RIGHT MEDIAL FRONTAL SCALP
LOCATION DETAILED: RIGHT CENTRAL ZYGOMA
LOCATION DETAILED: RIGHT SUPERIOR PARIETAL SCALP
LOCATION DETAILED: LEFT SUPERIOR HELIX
LOCATION DETAILED: LEFT SUPERIOR PARIETAL SCALP
LOCATION DETAILED: MID-FRONTAL SCALP
LOCATION DETAILED: RIGHT INFERIOR FOREHEAD
LOCATION DETAILED: LEFT FOREHEAD

## 2025-05-14 ASSESSMENT — LOCATION SIMPLE DESCRIPTION DERM
LOCATION SIMPLE: LEFT SCALP
LOCATION SIMPLE: LEFT FOREHEAD
LOCATION SIMPLE: RIGHT FOREHEAD
LOCATION SIMPLE: LEFT EAR
LOCATION SIMPLE: ANTERIOR SCALP
LOCATION SIMPLE: RIGHT SCALP
LOCATION SIMPLE: SCALP
LOCATION SIMPLE: RIGHT ZYGOMA
LOCATION SIMPLE: RIGHT EAR
LOCATION SIMPLE: RIGHT EYEBROW

## 2025-05-14 ASSESSMENT — LOCATION ZONE DERM
LOCATION ZONE: SCALP
LOCATION ZONE: EAR
LOCATION ZONE: FACE

## (undated) DEVICE — Device

## (undated) DEVICE — SLEEVE TR BAND RADIAL COMPRESSION DEVICE 29CM XX-RF06L

## (undated) DEVICE — CUSTOM PACK CORONARY SAN5BCRHEA

## (undated) DEVICE — CATH LAUNCHER 6FR LA6EBU375

## (undated) DEVICE — MANIFOLD KIT ANGIO AUTOMATED 014613

## (undated) DEVICE — CATH BALLOON EMERGE 3.0X15MM H7493918915300

## (undated) DEVICE — SHTH INTRO 0.021IN ID 6FR DIA

## (undated) DEVICE — GUIDEWIRE FORTE FLOPPY J TOP 34949-05J

## (undated) DEVICE — CATH ANGIO INFINITI AL1 4FRX100CM 538445

## (undated) DEVICE — CATH ANGIO JUDKINS JL4 6FRX100CM INFINITI 534620T

## (undated) DEVICE — CATH BALLOON NC EMERGE 4.00X12MM H7493926712400

## (undated) DEVICE — VALVE HEMOSTASIS .096" COPILOT MECH 1003331

## (undated) DEVICE — CATH LAUNCHER 6FR EBU 3.5 LA6EBU35

## (undated) DEVICE — GUIDEWIRE VASCULAR COMET II 185CML PRESSURE H74939359110

## (undated) DEVICE — WIRE GUIDE HI-TRQ  WHISPER MS JTIP 0.014"X190CM 1005357HJ

## (undated) DEVICE — KIT HAND CONTROL ACIST 014644 AR-P54

## (undated) DEVICE — GUIDEWIRE NITINOL .018 80CM N180802

## (undated) DEVICE — SYR ANGIOGRAPHY MULTIUSE KIT ACIST 014612

## (undated) DEVICE — CATH GUIDING 6FR AL .75 LA6AL75

## (undated) DEVICE — ELECTRODE DEFIB CADENCE 22550R

## (undated) DEVICE — CATH DIAG RADIAL 5FR TIG 4.5 100CM

## (undated) DEVICE — DEVICE INFLATION SYR W/ HEMOSTASIS VALVE 12IN EXT IN4904

## (undated) DEVICE — EXCHANGE WIRE .035 260 STAR/JFC/035/260/ M001491681

## (undated) RX ORDER — NICARDIPINE HCL-0.9% SOD CHLOR 1 MG/10 ML
SYRINGE (ML) INTRAVENOUS
Status: DISPENSED
Start: 2024-02-12

## (undated) RX ORDER — ACETAMINOPHEN 325 MG/1
TABLET ORAL
Status: DISPENSED
Start: 2024-02-12

## (undated) RX ORDER — CLOPIDOGREL 300 MG/1
TABLET, FILM COATED ORAL
Status: DISPENSED
Start: 2024-02-12

## (undated) RX ORDER — FENTANYL CITRATE 50 UG/ML
INJECTION, SOLUTION INTRAMUSCULAR; INTRAVENOUS
Status: DISPENSED
Start: 2024-02-12